# Patient Record
Sex: FEMALE | Race: WHITE | NOT HISPANIC OR LATINO | Employment: OTHER | URBAN - METROPOLITAN AREA
[De-identification: names, ages, dates, MRNs, and addresses within clinical notes are randomized per-mention and may not be internally consistent; named-entity substitution may affect disease eponyms.]

---

## 2018-03-23 ENCOUNTER — APPOINTMENT (EMERGENCY)
Dept: RADIOLOGY | Facility: HOSPITAL | Age: 58
End: 2018-03-23
Payer: COMMERCIAL

## 2018-03-23 ENCOUNTER — HOSPITAL ENCOUNTER (EMERGENCY)
Facility: HOSPITAL | Age: 58
Discharge: HOME | End: 2018-03-23
Admitting: EMERGENCY MEDICINE
Payer: COMMERCIAL

## 2018-03-23 VITALS
WEIGHT: 125 LBS | TEMPERATURE: 98.4 F | SYSTOLIC BLOOD PRESSURE: 165 MMHG | HEART RATE: 78 BPM | OXYGEN SATURATION: 96 % | RESPIRATION RATE: 17 BRPM | DIASTOLIC BLOOD PRESSURE: 80 MMHG

## 2018-03-23 DIAGNOSIS — J06.9 URI (UPPER RESPIRATORY INFECTION): Primary | ICD-10-CM

## 2018-03-23 PROCEDURE — 71046 X-RAY EXAM CHEST 2 VIEWS: CPT

## 2018-03-23 PROCEDURE — 99283 EMERGENCY DEPT VISIT LOW MDM: CPT

## 2018-03-23 NOTE — ED PROVIDER NOTES
History  Chief Complaint   Patient presents with    Sore Throat     Patient reports sore throat and a cough "at times" for the past week  Patient denies fevers  Denies n/v/d         65 yo F who presents today for evaluation of cough x 1 week  Cough productive of white sputum  Associated symptoms include nasal congestion, rhinorrhea, sore throat, right ear pain  No chest pain, no SOB  Has continued to smoke despite symptoms  Denies fevers, abd pain, n/v/d  Presents with her son who is being evaluated for similar symptoms  None       Past Medical History:   Diagnosis Date    Hyperlipidemia        Past Surgical History:   Procedure Laterality Date    BREAST SURGERY         History reviewed  No pertinent family history  I have reviewed and agree with the history as documented  Social History   Substance Use Topics    Smoking status: Current Every Day Smoker     Packs/day: 1 00     Types: Cigarettes    Smokeless tobacco: Never Used    Alcohol use No        Review of Systems   Constitutional: Negative for chills and fever  HENT: Positive for congestion, ear pain, rhinorrhea and sore throat  Negative for ear discharge and trouble swallowing  Respiratory: Positive for cough  Negative for chest tightness, shortness of breath and wheezing  Cardiovascular: Negative for chest pain and palpitations  Gastrointestinal: Negative for abdominal pain, diarrhea, nausea and vomiting  Musculoskeletal: Negative for back pain  Skin: Negative for rash  Neurological: Negative for dizziness, weakness, light-headedness, numbness and headaches         Physical Exam  ED Triage Vitals [03/23/18 1140]   Temperature Pulse Respirations Blood Pressure SpO2   98 4 °F (36 9 °C) 77 18 (!) 193/94 96 %      Temp Source Heart Rate Source Patient Position - Orthostatic VS BP Location FiO2 (%)   Oral Monitor Sitting Right arm --      Pain Score       9           Orthostatic Vital Signs  Vitals:    03/23/18 1140 03/23/18 1401   BP: (!) 193/94 165/80   Pulse: 77 78   Patient Position - Orthostatic VS: Sitting Sitting       Physical Exam   Constitutional: She is oriented to person, place, and time  She appears well-developed and well-nourished  Non-toxic appearance  She does not have a sickly appearance  She does not appear ill  No distress  Hoarse voice   HENT:   Head: Normocephalic and atraumatic  Right Ear: Hearing, tympanic membrane, external ear and ear canal normal    Left Ear: Hearing, tympanic membrane, external ear and ear canal normal    Nose: Nose normal  No mucosal edema or rhinorrhea  Mouth/Throat: Uvula is midline and oropharynx is clear and moist  No tonsillar exudate  Eyes: Conjunctivae and EOM are normal  Pupils are equal, round, and reactive to light  Neck: Trachea normal and normal range of motion  Neck supple  Cardiovascular: Normal rate, regular rhythm and normal heart sounds  Exam reveals no gallop and no friction rub  No murmur heard  Pulmonary/Chest: Effort normal and breath sounds normal  No respiratory distress  She has no decreased breath sounds  She has no wheezes  She has no rhonchi  She has no rales  Musculoskeletal: Normal range of motion  Neurological: She is alert and oriented to person, place, and time  Skin: Skin is warm and dry  Capillary refill takes less than 2 seconds  She is not diaphoretic  Psychiatric: She has a normal mood and affect  Nursing note and vitals reviewed  ED Medications  Medications - No data to display    Diagnostic Studies  Results Reviewed     None                 XR chest 2 views   ED Interpretation by Bulmaro Linares PA-C (03/23 1342)   No consolidation      Final Result by Miller Archuleta MD (03/23 1527)      No acute cardiopulmonary disease              Workstation performed: [id number]                    Procedures  Procedures       Phone Contacts  ED Phone Contact    ED Course  ED Course                                MDM  Number of Diagnoses or Management Options  URI (upper respiratory infection): new and requires workup  Diagnosis management comments:   65 yo F with complaints of sore throat, nasal congestion, rhinorrhea, hoarse voice, cough  No fevers  Vitals are stable  Her chest x-ray was negative for consolidation  Her lungs are clear to auscultation  I discussed supportive care for a viral URI  I discussed the use of over-the-counter medications  I discussed smoking cessation  I encouraged follow up with family doctor and return to ED for worsening  Patient verbalized understanding and agrees with plan  Amount and/or Complexity of Data Reviewed  Tests in the radiology section of CPT®: ordered and reviewed  Independent visualization of images, tracings, or specimens: yes    Patient Progress  Patient progress: stable    CritCare Time    Disposition  Final diagnoses:   URI (upper respiratory infection)     Time reflects when diagnosis was documented in both MDM as applicable and the Disposition within this note     Time User Action Codes Description Comment    3/23/2018  1:42 PM Xuan Zhong Add [J06 9] URI (upper respiratory infection)       ED Disposition     ED Disposition Condition Comment    Discharge  Talmage Holstein discharge to home/self care  Condition at discharge: Good        Follow-up Information     Follow up With Specialties Details Why Contact Info Additional Jun Corado Debbie 411, DO Internal Medicine Schedule an appointment as soon as possible for a visit  [address] [facility] [phone]       [address] [place] [address] Emergency Department Emergency Medicine  If symptoms worsen - fevers or trouble breathing [address] [facility] [address] [facility], [address], [address]        There are no discharge medications for this patient  No discharge procedures on file      ED Provider  Electronically Signed by           Drew Trent PA-C  03/25/18 0042

## 2018-03-23 NOTE — DISCHARGE INSTRUCTIONS
DRINK FLUIDS  Upper Respiratory Infection   WHAT YOU NEED TO KNOW:   An upper respiratory infection is also called the common cold  It is an infection that can affect your nose, throat, ears, and sinuses  For healthy people, the common cold is usually not serious and does not need special treatment  Cold symptoms are usually worst for the first 3 to 5 days  Most people get better in 7 to 14 days  You may continue to cough for 2 to 3 weeks  Colds are caused by viruses and do not get better with antibiotics  DISCHARGE INSTRUCTIONS:   Return to the emergency department if:   · You have chest pain or trouble breathing  Contact your healthcare provider if:   · You have a fever over 102ºF (39°C)  · Your sore throat gets worse or you see white or yellow spots in your throat  · Your symptoms get worse after 3 to 5 days or your cold is not better in 14 days  · You have a rash anywhere on your skin  · You have large, tender lumps in your neck  · You have thick, green or yellow drainage from your nose  · You cough up thick yellow, green, or bloody mucus  · You have vomiting for more than 24 hours and cannot keep fluids down  · You have a bad earache  · You have questions or concerns about your condition or care  Medicines: You may need any of the following:  · Decongestants  help reduce nasal congestion and help you breathe more easily  If you take decongestant pills, they may make you feel restless or cause problems with your sleep  Do not use decongestant sprays for more than a few days  · Cough suppressants  help reduce coughing  Ask your healthcare provider which type of cough medicine is best for you  · NSAIDs , such as ibuprofen, help decrease swelling, pain, and fever  NSAIDs can cause stomach bleeding or kidney problems in certain people  If you take blood thinner medicine, always ask your healthcare provider if NSAIDs are safe for you   Always read the medicine label and follow directions  · Acetaminophen  decreases pain and fever  It is available without a doctor's order  Ask how much to take and how often to take it  Follow directions  Read the labels of all other medicines you are using to see if they also contain acetaminophen, or ask your doctor or pharmacist  Acetaminophen can cause liver damage if not taken correctly  Do not use more than 4 grams (4,000 milligrams) total of acetaminophen in one day  · Take your medicine as directed  Contact your healthcare provider if you think your medicine is not helping or if you have side effects  Tell him or her if you are allergic to any medicine  Keep a list of the medicines, vitamins, and herbs you take  Include the amounts, and when and why you take them  Bring the list or the pill bottles to follow-up visits  Carry your medicine list with you in case of an emergency  Follow up with your healthcare provider as directed:  Write down your questions so you remember to ask them during your visits  Self-care:   · Rest as much as possible  Slowly start to do more each day  · Drink more liquids as directed  Liquids will help thin and loosen mucus so you can cough it up  Liquids will also help prevent dehydration  Liquids that help prevent dehydration include water, fruit juice, and broth  Do not drink liquids that contain caffeine  Caffeine can increase your risk for dehydration  Ask your healthcare provider how much liquid to drink each day  · Soothe a sore throat  Gargle with warm salt water  This helps your sore throat feel better  Make salt water by dissolving ¼ teaspoon salt in 1 cup warm water  You may also suck on hard candy or throat lozenges  You may use a sore throat spray  · Use a humidifier or vaporizer  Use a cool mist humidifier or a vaporizer to increase air moisture in your home  This may make it easier for you to breathe and help decrease your cough  · Use saline nasal drops as directed    These help relieve congestion  · Apply petroleum-based jelly around the outside of your nostrils  This can decrease irritation from blowing your nose  · Do not smoke  Nicotine and other chemicals in cigarettes and cigars can make your symptoms worse  They can also cause infections such as bronchitis or pneumonia  Ask your healthcare provider for information if you currently smoke and need help to quit  E-cigarettes or smokeless tobacco still contain nicotine  Talk to your healthcare provider before you use these products  Prevent spreading your cold to others:   · Try to stay away from other people during the first 2 to 3 days of your cold when it is more easily spread  · Do not share food or drinks  · Do not share hand towels with household members  · Wash your hands often, especially after you blow your nose  Turn away from other people and cover your mouth and nose with a tissue when you sneeze or cough  © 2017 [employer]  Information is for End User's use only and may not be sold, redistributed or otherwise used for commercial purposes  All illustrations and images included in CareNotes® are the copyrighted property of [employer]  or [employer]  The above information is an  only  It is not intended as medical advice for individual conditions or treatments  Talk to your doctor, nurse or pharmacist before following any medical regimen to see if it is safe and effective for you

## 2021-04-20 ENCOUNTER — TELEPHONE (OUTPATIENT)
Dept: CT IMAGING | Facility: HOSPITAL | Age: 61
End: 2021-04-20

## 2021-04-20 ENCOUNTER — APPOINTMENT (EMERGENCY)
Dept: CT IMAGING | Facility: HOSPITAL | Age: 61
End: 2021-04-20
Payer: COMMERCIAL

## 2021-04-20 ENCOUNTER — APPOINTMENT (EMERGENCY)
Dept: RADIOLOGY | Facility: HOSPITAL | Age: 61
End: 2021-04-20
Payer: COMMERCIAL

## 2021-04-20 ENCOUNTER — HOSPITAL ENCOUNTER (OUTPATIENT)
Facility: HOSPITAL | Age: 61
Setting detail: OBSERVATION
Discharge: LEFT BEFORE DISCHARGE | End: 2021-04-20
Attending: EMERGENCY MEDICINE | Admitting: INTERNAL MEDICINE
Payer: COMMERCIAL

## 2021-04-20 VITALS
RESPIRATION RATE: 18 BRPM | DIASTOLIC BLOOD PRESSURE: 106 MMHG | HEIGHT: 64 IN | HEART RATE: 88 BPM | OXYGEN SATURATION: 94 % | BODY MASS INDEX: 21.34 KG/M2 | WEIGHT: 125 LBS | TEMPERATURE: 98.3 F | SYSTOLIC BLOOD PRESSURE: 172 MMHG

## 2021-04-20 DIAGNOSIS — R77.8 ELEVATED TROPONIN: ICD-10-CM

## 2021-04-20 DIAGNOSIS — R91.1 PULMONARY NODULE: ICD-10-CM

## 2021-04-20 DIAGNOSIS — R10.9 ABDOMINAL PAIN: ICD-10-CM

## 2021-04-20 DIAGNOSIS — I10 HYPERTENSION: Primary | ICD-10-CM

## 2021-04-20 DIAGNOSIS — R79.89 ELEVATED BRAIN NATRIURETIC PEPTIDE (BNP) LEVEL: ICD-10-CM

## 2021-04-20 DIAGNOSIS — R06.00 DYSPNEA ON EXERTION: ICD-10-CM

## 2021-04-20 LAB
ALBUMIN SERPL BCP-MCNC: 3.7 G/DL (ref 3.5–5)
ALP SERPL-CCNC: 98 U/L (ref 46–116)
ALT SERPL W P-5'-P-CCNC: 27 U/L (ref 12–78)
ANION GAP SERPL CALCULATED.3IONS-SCNC: 11 MMOL/L (ref 4–13)
APTT PPP: 30 SECONDS (ref 23–37)
AST SERPL W P-5'-P-CCNC: 22 U/L (ref 5–45)
BACTERIA UR QL AUTO: NORMAL /HPF
BASOPHILS # BLD AUTO: 0.04 THOUSANDS/ΜL (ref 0–0.1)
BASOPHILS NFR BLD AUTO: 1 % (ref 0–1)
BILIRUB SERPL-MCNC: 0.41 MG/DL (ref 0.2–1)
BILIRUB UR QL STRIP: NEGATIVE
BUN SERPL-MCNC: 19 MG/DL (ref 5–25)
CALCIUM SERPL-MCNC: 9.7 MG/DL (ref 8.3–10.1)
CHLORIDE SERPL-SCNC: 103 MMOL/L (ref 100–108)
CLARITY UR: CLEAR
CO2 SERPL-SCNC: 28 MMOL/L (ref 21–32)
COLOR UR: YELLOW
CREAT SERPL-MCNC: 0.99 MG/DL (ref 0.6–1.3)
D DIMER PPP FEU-MCNC: 2.17 UG/ML FEU
EOSINOPHIL # BLD AUTO: 0.08 THOUSAND/ΜL (ref 0–0.61)
EOSINOPHIL NFR BLD AUTO: 1 % (ref 0–6)
ERYTHROCYTE [DISTWIDTH] IN BLOOD BY AUTOMATED COUNT: 13 % (ref 11.6–15.1)
FLUAV RNA RESP QL NAA+PROBE: NEGATIVE
FLUBV RNA RESP QL NAA+PROBE: NEGATIVE
GFR SERPL CREATININE-BSD FRML MDRD: 63 ML/MIN/1.73SQ M
GLUCOSE SERPL-MCNC: 99 MG/DL (ref 65–140)
GLUCOSE UR STRIP-MCNC: NEGATIVE MG/DL
HCT VFR BLD AUTO: 45.5 % (ref 34.8–46.1)
HGB BLD-MCNC: 15.6 G/DL (ref 11.5–15.4)
HGB UR QL STRIP.AUTO: NEGATIVE
IMM GRANULOCYTES # BLD AUTO: 0.03 THOUSAND/UL (ref 0–0.2)
IMM GRANULOCYTES NFR BLD AUTO: 0 % (ref 0–2)
INR PPP: 1 (ref 0.84–1.19)
KETONES UR STRIP-MCNC: NEGATIVE MG/DL
LEUKOCYTE ESTERASE UR QL STRIP: ABNORMAL
LYMPHOCYTES # BLD AUTO: 2.03 THOUSANDS/ΜL (ref 0.6–4.47)
LYMPHOCYTES NFR BLD AUTO: 27 % (ref 14–44)
MCH RBC QN AUTO: 31.1 PG (ref 26.8–34.3)
MCHC RBC AUTO-ENTMCNC: 34.3 G/DL (ref 31.4–37.4)
MCV RBC AUTO: 91 FL (ref 82–98)
MONOCYTES # BLD AUTO: 0.96 THOUSAND/ΜL (ref 0.17–1.22)
MONOCYTES NFR BLD AUTO: 13 % (ref 4–12)
NEUTROPHILS # BLD AUTO: 4.32 THOUSANDS/ΜL (ref 1.85–7.62)
NEUTS SEG NFR BLD AUTO: 58 % (ref 43–75)
NITRITE UR QL STRIP: NEGATIVE
NON-SQ EPI CELLS URNS QL MICRO: NORMAL /HPF
NRBC BLD AUTO-RTO: 0 /100 WBCS
NT-PROBNP SERPL-MCNC: 4138 PG/ML
PH UR STRIP.AUTO: 6.5 [PH] (ref 4.5–8)
PLATELET # BLD AUTO: 277 THOUSANDS/UL (ref 149–390)
PMV BLD AUTO: 10.6 FL (ref 8.9–12.7)
POTASSIUM SERPL-SCNC: 3.5 MMOL/L (ref 3.5–5.3)
PROT SERPL-MCNC: 8.3 G/DL (ref 6.4–8.2)
PROT UR STRIP-MCNC: ABNORMAL MG/DL
PROTHROMBIN TIME: 13.3 SECONDS (ref 11.6–14.5)
RBC # BLD AUTO: 5.01 MILLION/UL (ref 3.81–5.12)
RBC #/AREA URNS AUTO: NORMAL /HPF
RSV RNA RESP QL NAA+PROBE: NEGATIVE
SARS-COV-2 RNA RESP QL NAA+PROBE: NEGATIVE
SODIUM SERPL-SCNC: 142 MMOL/L (ref 136–145)
SP GR UR STRIP.AUTO: 1.02 (ref 1–1.03)
TROPONIN I SERPL-MCNC: 0.05 NG/ML
TROPONIN I SERPL-MCNC: 0.06 NG/ML
UROBILINOGEN UR QL STRIP.AUTO: 0.2 E.U./DL
WBC # BLD AUTO: 7.46 THOUSAND/UL (ref 4.31–10.16)
WBC #/AREA URNS AUTO: NORMAL /HPF

## 2021-04-20 PROCEDURE — 71275 CT ANGIOGRAPHY CHEST: CPT

## 2021-04-20 PROCEDURE — 85025 COMPLETE CBC W/AUTO DIFF WBC: CPT | Performed by: EMERGENCY MEDICINE

## 2021-04-20 PROCEDURE — 84484 ASSAY OF TROPONIN QUANT: CPT | Performed by: EMERGENCY MEDICINE

## 2021-04-20 PROCEDURE — 81001 URINALYSIS AUTO W/SCOPE: CPT

## 2021-04-20 PROCEDURE — 80053 COMPREHEN METABOLIC PANEL: CPT | Performed by: EMERGENCY MEDICINE

## 2021-04-20 PROCEDURE — 74177 CT ABD & PELVIS W/CONTRAST: CPT

## 2021-04-20 PROCEDURE — 36415 COLL VENOUS BLD VENIPUNCTURE: CPT | Performed by: EMERGENCY MEDICINE

## 2021-04-20 PROCEDURE — 99285 EMERGENCY DEPT VISIT HI MDM: CPT | Performed by: EMERGENCY MEDICINE

## 2021-04-20 PROCEDURE — 83880 ASSAY OF NATRIURETIC PEPTIDE: CPT | Performed by: EMERGENCY MEDICINE

## 2021-04-20 PROCEDURE — 96361 HYDRATE IV INFUSION ADD-ON: CPT

## 2021-04-20 PROCEDURE — 96375 TX/PRO/DX INJ NEW DRUG ADDON: CPT

## 2021-04-20 PROCEDURE — 96374 THER/PROPH/DIAG INJ IV PUSH: CPT

## 2021-04-20 PROCEDURE — G1004 CDSM NDSC: HCPCS

## 2021-04-20 PROCEDURE — 71045 X-RAY EXAM CHEST 1 VIEW: CPT

## 2021-04-20 PROCEDURE — 85610 PROTHROMBIN TIME: CPT | Performed by: EMERGENCY MEDICINE

## 2021-04-20 PROCEDURE — 93005 ELECTROCARDIOGRAM TRACING: CPT

## 2021-04-20 PROCEDURE — 0241U HB NFCT DS VIR RESP RNA 4 TRGT: CPT | Performed by: EMERGENCY MEDICINE

## 2021-04-20 PROCEDURE — 85379 FIBRIN DEGRADATION QUANT: CPT | Performed by: EMERGENCY MEDICINE

## 2021-04-20 PROCEDURE — 85730 THROMBOPLASTIN TIME PARTIAL: CPT | Performed by: EMERGENCY MEDICINE

## 2021-04-20 PROCEDURE — 99285 EMERGENCY DEPT VISIT HI MDM: CPT

## 2021-04-20 RX ORDER — ASPIRIN 325 MG
325 TABLET ORAL ONCE
Status: COMPLETED | OUTPATIENT
Start: 2021-04-20 | End: 2021-04-20

## 2021-04-20 RX ORDER — FENTANYL CITRATE 50 UG/ML
50 INJECTION, SOLUTION INTRAMUSCULAR; INTRAVENOUS ONCE
Status: COMPLETED | OUTPATIENT
Start: 2021-04-20 | End: 2021-04-20

## 2021-04-20 RX ORDER — HYDROMORPHONE HCL/PF 1 MG/ML
0.5 SYRINGE (ML) INJECTION ONCE
Status: COMPLETED | OUTPATIENT
Start: 2021-04-20 | End: 2021-04-20

## 2021-04-20 RX ORDER — ONDANSETRON 2 MG/ML
4 INJECTION INTRAMUSCULAR; INTRAVENOUS ONCE
Status: COMPLETED | OUTPATIENT
Start: 2021-04-20 | End: 2021-04-20

## 2021-04-20 RX ADMIN — ONDANSETRON 4 MG: 2 INJECTION INTRAMUSCULAR; INTRAVENOUS at 17:10

## 2021-04-20 RX ADMIN — IOHEXOL 100 ML: 350 INJECTION, SOLUTION INTRAVENOUS at 20:10

## 2021-04-20 RX ADMIN — ASPIRIN 325 MG ORAL TABLET 325 MG: 325 PILL ORAL at 21:33

## 2021-04-20 RX ADMIN — SODIUM CHLORIDE 1000 ML: 0.9 INJECTION, SOLUTION INTRAVENOUS at 17:08

## 2021-04-20 RX ADMIN — HYDROMORPHONE HYDROCHLORIDE 0.5 MG: 1 INJECTION, SOLUTION INTRAMUSCULAR; INTRAVENOUS; SUBCUTANEOUS at 17:14

## 2021-04-20 RX ADMIN — FENTANYL CITRATE 50 MCG: 50 INJECTION INTRAMUSCULAR; INTRAVENOUS at 19:13

## 2021-04-20 NOTE — Clinical Note
Case was discussed with LEXI and the patient's admission status was agreed to be Admission Status: observation status to the service of Dr Jessica Leonard
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
## 2021-04-21 LAB
ATRIAL RATE: 99 BPM
P AXIS: 38 DEGREES
PR INTERVAL: 156 MS
QRS AXIS: 15 DEGREES
QRSD INTERVAL: 94 MS
QT INTERVAL: 378 MS
QTC INTERVAL: 473 MS
T WAVE AXIS: 73 DEGREES
VENTRICULAR RATE: 94 BPM

## 2021-04-21 PROCEDURE — 93010 ELECTROCARDIOGRAM REPORT: CPT | Performed by: INTERNAL MEDICINE

## 2021-04-21 NOTE — ASSESSMENT & PLAN NOTE
· Initial troponin 0 05 with repeat troponin of 0 06    · Presented with SINGH, possible anginal equivalent vs troponin elevation in the setting of elevated BP  · EKG with nonspecific T waves  · Continue to trend troponin and monitor on telemetry     · Cardiology consulted  · Obtain echo

## 2021-04-21 NOTE — ED NOTES
Patient transported to [facility] 20, [address] [facility]  04/20/21 2006

## 2021-04-21 NOTE — ASSESSMENT & PLAN NOTE
· Incidental finding of a 5 mm right middle lobe nodule  "Based on current Fleischner Society 2017 Guidelines on incidental pulmonary nodule, no routine follow-up is needed if the patient is considered low risk for lung cancer    If the patient is considered high risk for lung cancer, 12 month follow-up non-contrast chest CT is recommended "

## 2021-04-21 NOTE — ASSESSMENT & PLAN NOTE
· CT abdomen/ pelvis shows no acute findings in the abdomen or pelvis but does note a lobulated contour of the liver, suspicious for early cirrhosis  · LFTs within normal limits  Urine unremarkable for infection

## 2021-04-21 NOTE — ED CARE HANDOFF
Emergency Department Sign Out Note        Sign out and transfer of care from Dr Tigre Lizarraga at 1900  See Separate Emergency Department note  The patient, Hernandez De Luna, was evaluated by the previous provider for abdominal pain/dyspnea on exertion       Workup Completed:  Labs show troponin 0 05  EKG nonspecific  CT PE study/CT abdomen/pelvis order and pending at time of sign-out  ED Course / Workup Pending (followup):  Plan to admit following CT scan results  No aspirin given as dissection was still in the differential     CT PE study with no signs of acute emergent pathology  Patient re-evaluated  No active chest pain  Aspirin given and patient admitted to [facility] service for observation admission on telemetry  Patient was admitted to the [facility] service for observation on telemetry  After patient was admitted she decided she would leave against medical advice  She is alert oriented  She understands that she has risk of heart attack, CHF, heart failure and death if she leaves              PERC Rule for PE      Most Recent Value   PERC Rule for PE   Age >=50  1 Filed at: 2021 1658   HR >=100  1 Filed at: 2021 1658   O2 Sat on room air < 95%  0 Filed at: 2021 1658   History of PE or DVT  --   Recent trauma or surgery  0 Filed at: 2021 1658   Hemoptysis  0 Filed at: 2021 1658   Exogenous estrogen  0 Filed at: 2021 1658   Unilateral leg swelling  0 Filed at: 2021 1658   PERC Rule for PE Results  2 Filed at: 2021 1658              Wells' Criteria for PE      Most Recent Value   Wells' Criteria for PE   Clinical signs and symptoms of DVT  0 Filed at: 2021 1658   PE is primary diagnosis or equally likely  3 Filed at: 2021 1658   HR >100  1 5 Filed at: 2021 1658   Immobilization at least 3 days or Surgery in the previous 4 weeks  0 Filed at: 2021 1658   Previous, objectively diagnosed PE or DVT  0 Filed at: 2021 1658 Hemoptysis  0 Filed at: 04/20/2021 1658   Malignancy with treatment within 6 months or palliative  0 Filed at: 04/20/2021 1658   Wells' Criteria Total  4 5 Filed at: 04/20/2021 1658                       Procedures  MDM    Disposition  Final diagnoses:   Hypertension   Elevated troponin   Elevated brain natriuretic peptide (BNP) level   Abdominal pain   Dyspnea on exertion     Time reflects when diagnosis was documented in both MDM as applicable and the Disposition within this note     Time User Action Codes Description Comment    4/20/2021  9:07 PM Remy Ditch Add [I10] Hypertension     4/20/2021  9:07 PM Morales Vizcarra [R77 8] Elevated troponin     4/20/2021  9:07 PM Remy Ditch Add [R79 89] Elevated brain natriuretic peptide (BNP) level     4/20/2021  9:07 PM Remy Ditch Add [R10 9] Abdominal pain     4/20/2021  9:07 PM Remy Ditch Add [R06 00] Dyspnea on exertion       ED Disposition     ED Disposition Condition Date/Time Comment    Admit Stable Tue Apr 20, 2021  9:07 PM Case was discussed with LEXI and the patient's admission status was agreed to be Admission Status: observation status to the service of Dr Yaa Madden   Follow-up Information    None       Patient's Medications    No medications on file     No discharge procedures on file         ED Provider  Electronically Signed by     Susi Mueller MD  04/20/21 1328       Susi Mueller MD  04/20/21 4311

## 2021-04-21 NOTE — ASSESSMENT & PLAN NOTE
· Presented with SINGH  · D-dimer and BNP elevated  CTA chest with no evidence of PE and no acute pulmonary disease; mild emphysema noted  · Obtain echocardiogram   · Pulse ox reportedly 87-90% on RA while in the ED per ED provider's note and patient was placed on 3L NC oxygen with improvement in pulse ox to the mid 90s  · Wean oxygen as tolerated

## 2021-04-30 ENCOUNTER — HOSPITAL ENCOUNTER (INPATIENT)
Facility: HOSPITAL | Age: 61
LOS: 5 days | Discharge: HOME | DRG: 469 | End: 2021-05-07
Attending: EMERGENCY MEDICINE | Admitting: HOSPITALIST
Payer: COMMERCIAL

## 2021-04-30 ENCOUNTER — APPOINTMENT (EMERGENCY)
Dept: RADIOLOGY | Facility: HOSPITAL | Age: 61
DRG: 469 | End: 2021-04-30
Payer: COMMERCIAL

## 2021-04-30 DIAGNOSIS — R21 RASH OF FOOT: ICD-10-CM

## 2021-04-30 DIAGNOSIS — G47.00 INSOMNIA, UNSPECIFIED TYPE: ICD-10-CM

## 2021-04-30 DIAGNOSIS — I10 HYPERTENSION: ICD-10-CM

## 2021-04-30 DIAGNOSIS — R07.9 CHEST PAIN: Primary | ICD-10-CM

## 2021-04-30 DIAGNOSIS — N17.9 AKI (ACUTE KIDNEY INJURY) (HCC): ICD-10-CM

## 2021-04-30 DIAGNOSIS — R79.89 ELEVATED BRAIN NATRIURETIC PEPTIDE (BNP) LEVEL: ICD-10-CM

## 2021-04-30 DIAGNOSIS — E78.5 HYPERLIPIDEMIA, UNSPECIFIED HYPERLIPIDEMIA TYPE: ICD-10-CM

## 2021-04-30 DIAGNOSIS — F17.200 SMOKING: ICD-10-CM

## 2021-04-30 DIAGNOSIS — R79.89 ELEVATED D-DIMER: ICD-10-CM

## 2021-04-30 DIAGNOSIS — R06.00 DOE (DYSPNEA ON EXERTION): ICD-10-CM

## 2021-04-30 DIAGNOSIS — R10.9 ABDOMINAL PAIN, UNSPECIFIED ABDOMINAL LOCATION: ICD-10-CM

## 2021-04-30 DIAGNOSIS — I50.9 NEW ONSET OF CONGESTIVE HEART FAILURE (HCC): ICD-10-CM

## 2021-04-30 DIAGNOSIS — R06.00 DYSPNEA: ICD-10-CM

## 2021-04-30 DIAGNOSIS — M54.9 BACK PAIN: ICD-10-CM

## 2021-04-30 PROBLEM — R06.02 SHORTNESS OF BREATH: Status: ACTIVE | Noted: 2021-04-30

## 2021-04-30 PROBLEM — I16.0 HYPERTENSIVE URGENCY: Status: ACTIVE | Noted: 2021-04-30

## 2021-04-30 PROBLEM — R93.2 ABNORMAL FINDING ON IMAGING OF LIVER: Status: ACTIVE | Noted: 2021-04-30

## 2021-04-30 LAB
ALBUMIN SERPL BCP-MCNC: 3.1 G/DL (ref 3.5–5)
ALP SERPL-CCNC: 125 U/L (ref 46–116)
ALT SERPL W P-5'-P-CCNC: 37 U/L (ref 12–78)
ANION GAP SERPL CALCULATED.3IONS-SCNC: 6 MMOL/L (ref 4–13)
AST SERPL W P-5'-P-CCNC: 25 U/L (ref 5–45)
ATRIAL RATE: 92 BPM
BASOPHILS # BLD AUTO: 0.05 THOUSANDS/ΜL (ref 0–0.1)
BASOPHILS NFR BLD AUTO: 1 % (ref 0–1)
BILIRUB SERPL-MCNC: 0.59 MG/DL (ref 0.2–1)
BUN SERPL-MCNC: 15 MG/DL (ref 5–25)
CALCIUM ALBUM COR SERPL-MCNC: 10.1 MG/DL (ref 8.3–10.1)
CALCIUM SERPL-MCNC: 9.4 MG/DL (ref 8.3–10.1)
CHLORIDE SERPL-SCNC: 111 MMOL/L (ref 100–108)
CHOLEST SERPL-MCNC: 199 MG/DL (ref 50–200)
CO2 SERPL-SCNC: 24 MMOL/L (ref 21–32)
CREAT SERPL-MCNC: 0.99 MG/DL (ref 0.6–1.3)
D DIMER PPP FEU-MCNC: 3.09 UG/ML FEU
EOSINOPHIL # BLD AUTO: 0.17 THOUSAND/ΜL (ref 0–0.61)
EOSINOPHIL NFR BLD AUTO: 2 % (ref 0–6)
ERYTHROCYTE [DISTWIDTH] IN BLOOD BY AUTOMATED COUNT: 12.9 % (ref 11.6–15.1)
EST. AVERAGE GLUCOSE BLD GHB EST-MCNC: 108 MG/DL
GFR SERPL CREATININE-BSD FRML MDRD: 63 ML/MIN/1.73SQ M
GLUCOSE SERPL-MCNC: 117 MG/DL (ref 65–140)
HBA1C MFR BLD: 5.4 %
HCT VFR BLD AUTO: 37.6 % (ref 34.8–46.1)
HDLC SERPL-MCNC: 44 MG/DL
HGB BLD-MCNC: 12.4 G/DL (ref 11.5–15.4)
IMM GRANULOCYTES # BLD AUTO: 0.03 THOUSAND/UL (ref 0–0.2)
IMM GRANULOCYTES NFR BLD AUTO: 0 % (ref 0–2)
LDLC SERPL CALC-MCNC: 134 MG/DL (ref 0–100)
LIPASE SERPL-CCNC: 93 U/L (ref 73–393)
LYMPHOCYTES # BLD AUTO: 0.88 THOUSANDS/ΜL (ref 0.6–4.47)
LYMPHOCYTES NFR BLD AUTO: 10 % (ref 14–44)
MAGNESIUM SERPL-MCNC: 2.3 MG/DL (ref 1.6–2.6)
MCH RBC QN AUTO: 30.2 PG (ref 26.8–34.3)
MCHC RBC AUTO-ENTMCNC: 33 G/DL (ref 31.4–37.4)
MCV RBC AUTO: 92 FL (ref 82–98)
MONOCYTES # BLD AUTO: 0.86 THOUSAND/ΜL (ref 0.17–1.22)
MONOCYTES NFR BLD AUTO: 10 % (ref 4–12)
NEUTROPHILS # BLD AUTO: 6.59 THOUSANDS/ΜL (ref 1.85–7.62)
NEUTS SEG NFR BLD AUTO: 77 % (ref 43–75)
NRBC BLD AUTO-RTO: 0 /100 WBCS
NT-PROBNP SERPL-MCNC: 6281 PG/ML
P AXIS: 106 DEGREES
PLATELET # BLD AUTO: 288 THOUSANDS/UL (ref 149–390)
PMV BLD AUTO: 10.9 FL (ref 8.9–12.7)
POTASSIUM SERPL-SCNC: 3.4 MMOL/L (ref 3.5–5.3)
PR INTERVAL: 128 MS
PROT SERPL-MCNC: 8.1 G/DL (ref 6.4–8.2)
QRS AXIS: -22 DEGREES
QRSD INTERVAL: 94 MS
QT INTERVAL: 362 MS
QTC INTERVAL: 447 MS
RBC # BLD AUTO: 4.1 MILLION/UL (ref 3.81–5.12)
SARS-COV-2 RNA RESP QL NAA+PROBE: NEGATIVE
SODIUM SERPL-SCNC: 141 MMOL/L (ref 136–145)
T WAVE AXIS: 100 DEGREES
TRIGL SERPL-MCNC: 103 MG/DL
TROPONIN I SERPL-MCNC: 0.03 NG/ML
TSH SERPL DL<=0.05 MIU/L-ACNC: 3.61 UIU/ML (ref 0.36–3.74)
VENTRICULAR RATE: 92 BPM
WBC # BLD AUTO: 8.58 THOUSAND/UL (ref 4.31–10.16)

## 2021-04-30 PROCEDURE — 83880 ASSAY OF NATRIURETIC PEPTIDE: CPT | Performed by: EMERGENCY MEDICINE

## 2021-04-30 PROCEDURE — 36415 COLL VENOUS BLD VENIPUNCTURE: CPT | Performed by: EMERGENCY MEDICINE

## 2021-04-30 PROCEDURE — 85379 FIBRIN DEGRADATION QUANT: CPT | Performed by: EMERGENCY MEDICINE

## 2021-04-30 PROCEDURE — U0003 INFECTIOUS AGENT DETECTION BY NUCLEIC ACID (DNA OR RNA); SEVERE ACUTE RESPIRATORY SYNDROME CORONAVIRUS 2 (SARS-COV-2) (CORONAVIRUS DISEASE [COVID-19]), AMPLIFIED PROBE TECHNIQUE, MAKING USE OF HIGH THROUGHPUT TECHNOLOGIES AS DESCRIBED BY CMS-2020-01-R: HCPCS | Performed by: EMERGENCY MEDICINE

## 2021-04-30 PROCEDURE — 83735 ASSAY OF MAGNESIUM: CPT | Performed by: STUDENT IN AN ORGANIZED HEALTH CARE EDUCATION/TRAINING PROGRAM

## 2021-04-30 PROCEDURE — 71045 X-RAY EXAM CHEST 1 VIEW: CPT

## 2021-04-30 PROCEDURE — 94760 N-INVAS EAR/PLS OXIMETRY 1: CPT

## 2021-04-30 PROCEDURE — 85025 COMPLETE CBC W/AUTO DIFF WBC: CPT | Performed by: EMERGENCY MEDICINE

## 2021-04-30 PROCEDURE — 93010 ELECTROCARDIOGRAM REPORT: CPT | Performed by: INTERNAL MEDICINE

## 2021-04-30 PROCEDURE — 80053 COMPREHEN METABOLIC PANEL: CPT | Performed by: EMERGENCY MEDICINE

## 2021-04-30 PROCEDURE — 99285 EMERGENCY DEPT VISIT HI MDM: CPT

## 2021-04-30 PROCEDURE — 99223 1ST HOSP IP/OBS HIGH 75: CPT | Performed by: HOSPITALIST

## 2021-04-30 PROCEDURE — 93005 ELECTROCARDIOGRAM TRACING: CPT

## 2021-04-30 PROCEDURE — 84484 ASSAY OF TROPONIN QUANT: CPT | Performed by: EMERGENCY MEDICINE

## 2021-04-30 PROCEDURE — 96374 THER/PROPH/DIAG INJ IV PUSH: CPT

## 2021-04-30 PROCEDURE — NC001 PR NO CHARGE: Performed by: HOSPITALIST

## 2021-04-30 PROCEDURE — U0005 INFEC AGEN DETEC AMPLI PROBE: HCPCS | Performed by: EMERGENCY MEDICINE

## 2021-04-30 PROCEDURE — 83036 HEMOGLOBIN GLYCOSYLATED A1C: CPT | Performed by: STUDENT IN AN ORGANIZED HEALTH CARE EDUCATION/TRAINING PROGRAM

## 2021-04-30 PROCEDURE — 84443 ASSAY THYROID STIM HORMONE: CPT | Performed by: STUDENT IN AN ORGANIZED HEALTH CARE EDUCATION/TRAINING PROGRAM

## 2021-04-30 PROCEDURE — 99285 EMERGENCY DEPT VISIT HI MDM: CPT | Performed by: EMERGENCY MEDICINE

## 2021-04-30 PROCEDURE — 80061 LIPID PANEL: CPT | Performed by: STUDENT IN AN ORGANIZED HEALTH CARE EDUCATION/TRAINING PROGRAM

## 2021-04-30 PROCEDURE — 83690 ASSAY OF LIPASE: CPT | Performed by: STUDENT IN AN ORGANIZED HEALTH CARE EDUCATION/TRAINING PROGRAM

## 2021-04-30 RX ORDER — FUROSEMIDE 10 MG/ML
40 INJECTION INTRAMUSCULAR; INTRAVENOUS DAILY
Status: DISCONTINUED | OUTPATIENT
Start: 2021-04-30 | End: 2021-05-01

## 2021-04-30 RX ORDER — ACETAMINOPHEN 325 MG/1
975 TABLET ORAL EVERY 6 HOURS PRN
Status: DISCONTINUED | OUTPATIENT
Start: 2021-04-30 | End: 2021-05-07 | Stop reason: HOSPADM

## 2021-04-30 RX ORDER — LABETALOL 20 MG/4 ML (5 MG/ML) INTRAVENOUS SYRINGE
10 EVERY 6 HOURS PRN
Status: DISCONTINUED | OUTPATIENT
Start: 2021-04-30 | End: 2021-05-07 | Stop reason: HOSPADM

## 2021-04-30 RX ORDER — POTASSIUM CHLORIDE 20 MEQ/1
40 TABLET, EXTENDED RELEASE ORAL ONCE
Status: COMPLETED | OUTPATIENT
Start: 2021-04-30 | End: 2021-04-30

## 2021-04-30 RX ORDER — KETOROLAC TROMETHAMINE 30 MG/ML
30 INJECTION, SOLUTION INTRAMUSCULAR; INTRAVENOUS EVERY 6 HOURS PRN
Status: DISCONTINUED | OUTPATIENT
Start: 2021-04-30 | End: 2021-05-01

## 2021-04-30 RX ORDER — DICYCLOMINE HCL 20 MG
20 TABLET ORAL
Status: DISCONTINUED | OUTPATIENT
Start: 2021-04-30 | End: 2021-05-07 | Stop reason: HOSPADM

## 2021-04-30 RX ORDER — HYDROMORPHONE HCL/PF 1 MG/ML
0.5 SYRINGE (ML) INJECTION ONCE AS NEEDED
Status: DISCONTINUED | OUTPATIENT
Start: 2021-04-30 | End: 2021-04-30

## 2021-04-30 RX ORDER — ALBUTEROL SULFATE 90 UG/1
2 AEROSOL, METERED RESPIRATORY (INHALATION) EVERY 4 HOURS PRN
Status: DISCONTINUED | OUTPATIENT
Start: 2021-04-30 | End: 2021-05-07 | Stop reason: HOSPADM

## 2021-04-30 RX ORDER — MORPHINE SULFATE 4 MG/ML
4 INJECTION, SOLUTION INTRAMUSCULAR; INTRAVENOUS ONCE
Status: COMPLETED | OUTPATIENT
Start: 2021-04-30 | End: 2021-04-30

## 2021-04-30 RX ORDER — ACETAMINOPHEN 325 MG/1
650 TABLET ORAL EVERY 6 HOURS PRN
Status: DISCONTINUED | OUTPATIENT
Start: 2021-04-30 | End: 2021-04-30

## 2021-04-30 RX ORDER — CALCIUM CARBONATE 200(500)MG
1000 TABLET,CHEWABLE ORAL DAILY PRN
Status: DISCONTINUED | OUTPATIENT
Start: 2021-04-30 | End: 2021-05-07 | Stop reason: HOSPADM

## 2021-04-30 RX ADMIN — KETOROLAC TROMETHAMINE 30 MG: 30 INJECTION, SOLUTION INTRAMUSCULAR at 21:24

## 2021-04-30 RX ADMIN — DICYCLOMINE HYDROCHLORIDE 20 MG: 20 TABLET ORAL at 18:05

## 2021-04-30 RX ADMIN — FUROSEMIDE 40 MG: 10 INJECTION, SOLUTION INTRAMUSCULAR; INTRAVENOUS at 18:05

## 2021-04-30 RX ADMIN — MORPHINE SULFATE 4 MG: 4 INJECTION INTRAVENOUS at 14:16

## 2021-04-30 RX ADMIN — POTASSIUM CHLORIDE 40 MEQ: 1500 TABLET, EXTENDED RELEASE ORAL at 18:03

## 2021-04-30 RX ADMIN — ACETAMINOPHEN 650 MG: 325 TABLET ORAL at 18:05

## 2021-04-30 NOTE — Clinical Note
Case was discussed with Dr Abdoulaye Dolan, 55 [address]  [facility] admitting resident, and the patient's admission status was agreed to be Admission Status: inpatient status to the service of Dr Angie Geller

## 2021-04-30 NOTE — PROGRESS NOTES
INTERNAL MEDICINE RESIDENCY SENIOR ADMISSION NOTE     Name: Grabiel Wayne   Age & Sex: 63 y o  female   MRN: [id number]  Unit/Bed#: ED 6   Encounter: [id number]  Primary Care Provider: No primary care provider on file  Admit to team: SOD Team A    Patient seen and examined  Reviewed H&P per Dr Rocio Best   Agree with the assessment and plan with any exception/addition as noted below:    Principal Problem:    Shortness of breath  Active Problems:    Hyperlipidemia    Hypertensive urgency    Positive D dimer    Abnormal finding on imaging of liver    68-year-old female with past medical history of hyperlipidemia, tobacco abuse was not seen a physician in many years presented to the ED with worsening 2 week history of shortness of breath  Of note patient did present to [address] [place] [address] ED on 4/20 with similar presentation, as that time admitted for possible newly diagnosed heart failure, patient left AMA  Return to the ED today because her shortness of breath and dyspnea on exertion worsen  Denies any chest pain at all currently, or in the last 3 weeks  Endorses orthopnea  Generalized abdominal bloating and fullness as well as early satiety  Upon arrival to the ED patient's initial blood pressure 198/111 heart rate in the 90s  Chest x-ray revealed cardiomegaly with vascular congestion  NT proBNP 6200 today from 4100 on 04/20/2021  EKG normal sinus rhythm, left ventricular hypertrophy, inverted T-waves noted in lateral leads  Troponin 0 03  Positive JVD, bilateral crackles  Likely newly diagnosed heart failure and hypertensive urgency in setting of chronically uncontrolled hypertension (per chart multiple BP readings ranging from 150-160/ since 2015)  · Will initiate on 40 IV Lasix q d  · Formal echocardiogram ordered, consider ischemic workup to rule out ischemic cardiomyopathy if this is volume overload in setting of newly diagnosed heart failure  · P r n   Labetalol for SBP greater than 180, plan to start on p o   Antihypertensive tomorrow  · Monitor on telemetry    Further management as highlighted in H&P      Code Status: Level 3 - DNAR and DNI  Admission Status: OBSERVATION  Disposition: Patient requires Med/Surg with [address] [place] [address]  Internal Medicine- PGY2
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
## 2021-04-30 NOTE — ED ATTENDING ATTESTATION
Sasha Romo MD, saw and evaluated the patient  I have discussed the patient with the resident and agree with the resident's findings, Plan of Care, and MDM as documented in the resident's note, except where noted  All available labs and Radiology studies were reviewed  I was present for key portions of any procedure(s) performed by the resident and I was immediately available to provide assistance  At this point I agree with the current assessment done in the Emergency Department  I have conducted an independent evaluation of this patient a history and physical is as follows:    62 yo female presents to the ED with multiple vague complaints including abdominal pain, shortness of breath, chest pain, and back pain x 2 weeks  The patient was recently evaluated at [address] [place] [address] for these symptoms --> workup revealed a mildly elevated troponin, BNP, and d dimer  Subsequent PE CT was negative  She was admitted to the hospital for further workup but left AMA before a bed was assigned  She has been at an outside ED twice since that time but LWBS both times  She says all of her symptoms have been gradually worsening since onset and she "can't take it any more"  Gen: Anxious/Tearful, AA&Ox3  HEENT: PERRL, EOMI  Neck: supple  CV: RRR  Lungs: CT AB/L  Abdomen: soft, NT/ND  Ext: no swelling or deformity  Neuro: 5/5 strength all extremities      ED Course  The patient is uncomfortable appearing but her physical exam is benign  She is hypertensive and tachypneic  Unclear etiology of her symptoms  Will repeat workup including troponin, dimer, BNP, CXR, COVID-19 swab, and basic labs  Analgesia administered  The patient will likely require admission to [facility]         Critical Care Time  Procedures

## 2021-04-30 NOTE — ASSESSMENT & PLAN NOTE
Patient presented with shortness of breath initially concerning for heart failure etiology given radiograph and cardiac serologies, however etiology appears unclear at this time  Shortness of breath seems secondary to bouts of abdominal pain  Additionally, she remains euvolemic on exam today without any signs or symptoms concerning for heart failure      Plan:  · Will continue holding off further diuresis  · TTE obtained yesterday, await official read, however as patient did have profoundly elevated NT proBNP with signs of LVH on presenting EKG, anticipate ejection fraction to be reduced and patient to require ischemic evaluation, either in the inpatient or outpatient setting  · -See CHF

## 2021-04-30 NOTE — ASSESSMENT & PLAN NOTE
Patient with a blood pressure of 198/111 on arrival   Lab work indicating no signs of end-organ damage  Blood pressures are improving on antihypertensive      Plan:  · Labetalol 10 mg as needed every 6 hours for SBP greater than 180  · Continue amlodipine 5 mg daily  · Monitor pressures closely

## 2021-04-30 NOTE — ED PROVIDER NOTES
History  Chief Complaint   Patient presents with    Abdominal Pain     Abdominal pain radiating to the left flank, chest and back  Denies N/V/D  Going on for 2 weeks     64 y/o female presents to the ED via EMS for evaluation of left lateral chest wall pain, dyspena, and back pain x 2 weeks  Her symptoms are gradual in onset and worsening  She describes her chest pain as sharp, in the left lateral chest wall and radiating across her back  She was seen at [facility] 10 days ago for these symptoms and had a work up that showed Trop elevation to 0 06, BNP 4,138, D-dimer 2 17 with a negative CT PE protocol  She was admitted at that time however she then left AMA (she states that she was boarding in the ER and felt that she could manage her symptoms better at home)  Symptoms have continued to gradually worsen over the last 10 days  Today she is amenable to staying for further evaluation  None       Past Medical History:   Diagnosis Date    Hyperlipidemia     RSD (reflex sympathetic dystrophy)        Past Surgical History:   Procedure Laterality Date    BREAST SURGERY         History reviewed  No pertinent family history  I have reviewed and agree with the history as documented  E-Cigarette/Vaping    E-Cigarette Use Never User      E-Cigarette/Vaping Substances     Social History     Tobacco Use    Smoking status: Current Every Day Smoker     Packs/day: 1 00     Types: Cigarettes    Smokeless tobacco: Never Used   Substance Use Topics    Alcohol use: No    Drug use: No        Review of Systems   Constitutional: Negative for chills and fever  HENT: Negative for congestion and sore throat  Respiratory: Positive for shortness of breath  Negative for cough and wheezing  Cardiovascular: Positive for chest pain  Negative for palpitations and leg swelling  Gastrointestinal: Negative for abdominal pain, diarrhea, nausea and vomiting  Genitourinary: Negative for dysuria and hematuria  Musculoskeletal: Positive for back pain  Negative for neck pain  Neurological: Negative for dizziness, weakness, light-headedness, numbness and headaches  All other systems reviewed and are negative  Physical Exam  ED Triage Vitals   Temperature Pulse Respirations Blood Pressure SpO2   04/30/21 1332 04/30/21 1325 04/30/21 1325 04/30/21 1325 04/30/21 1325   98 4 °F (36 9 °C) 100 18 (!) 198/111 97 %      Temp Source Heart Rate Source Patient Position - Orthostatic VS BP Location FiO2 (%)   04/30/21 1332 04/30/21 1325 -- 04/30/21 1325 --   Oral Monitor  Right arm       Pain Score       04/30/21 1416       Worst Possible Pain             Orthostatic Vital Signs  Vitals:    04/30/21 1325 04/30/21 1559 04/30/21 1611   BP: (!) 198/111 164/100 164/100   Pulse: 100 92 80       Physical Exam  Vitals signs and nursing note reviewed  Constitutional:       Appearance: She is well-developed and normal weight  Comments: Adult female, lying in bed, appears uncomfortable and in mild distress  HENT:      Head: Normocephalic and atraumatic  Mouth/Throat:      Mouth: Mucous membranes are moist       Pharynx: Oropharynx is clear  Eyes:      Extraocular Movements: Extraocular movements intact  Pupils: Pupils are equal, round, and reactive to light  Cardiovascular:      Rate and Rhythm: Normal rate and regular rhythm  Heart sounds: Normal heart sounds  No murmur  Pulmonary:      Effort: Pulmonary effort is normal  No respiratory distress  Breath sounds: Normal breath sounds  No wheezing or rales  Comments: No reproducible chest wall tenderness on palpation  Symmetric chest rise  No palpable bony defect of the chest wall  No crepitus  Chest:      Chest wall: No tenderness  Abdominal:      General: Abdomen is flat  Bowel sounds are normal       Palpations: Abdomen is soft  Tenderness: There is no abdominal tenderness   There is no right CVA tenderness, left CVA tenderness or guarding  Skin:     General: Skin is warm and dry  Capillary Refill: Capillary refill takes less than 2 seconds  Neurological:      General: No focal deficit present  Mental Status: She is alert and oriented to person, place, and time  Cranial Nerves: No cranial nerve deficit  Motor: No weakness  ED Medications  Medications   morphine (PF) 4 mg/mL injection 4 mg (4 mg Intravenous Given 4/30/21 1416)       Diagnostic Studies  Results Reviewed     Procedure Component Value Units Date/Time    Novel Coronavirus (Covid-19),PCR [facility] - 2 Hour Stat [[id number]]  (Normal) Collected: 04/30/21 1416    Lab Status: Final result Specimen: Nares from Nose Updated: 04/30/21 1524     SARS-CoV-2 Negative    Narrative: The specimen collection materials, transport medium, and/or testing methodology utilized in the production of these test results have been proven to be reliable in a limited validation with an abbreviated program under the Emergency Utilization Authorization provided by the FDA  Testing reported as "Presumptive positive" will be confirmed with secondary testing to ensure result accuracy  Clinical caution and judgement should be used with the interpretation of these results with consideration of the clinical impression and other laboratory testing  Testing reported as "Positive" or "Negative" has been proven to be accurate according to standard laboratory validation requirements  All testing is performed with control materials showing appropriate reactivity at standard intervals        D-Dimer [[id number]]  (Abnormal) Collected: 04/30/21 1416    Lab Status: Final result Specimen: Blood from Arm, Left Updated: 04/30/21 1453     D-Dimer, Quant 3 09 ug/ml FEU     NT-BNP PRO [[id number]]  (Abnormal) Collected: 04/30/21 1408    Lab Status: Final result Specimen: Blood from Arm, Left Updated: 04/30/21 1442     NT-proBNP 6,281 pg/mL     Comprehensive metabolic panel [[id number]]  (Abnormal) Collected: 04/30/21 1408    Lab Status: Final result Specimen: Blood from Arm, Left Updated: 04/30/21 1442     Sodium 141 mmol/L      Potassium 3 4 mmol/L      Chloride 111 mmol/L      CO2 24 mmol/L      ANION GAP 6 mmol/L      BUN 15 mg/dL      Creatinine 0 99 mg/dL      Glucose 117 mg/dL      Calcium 9 4 mg/dL      Corrected Calcium 10 1 mg/dL      AST 25 U/L      ALT 37 U/L      Alkaline Phosphatase 125 U/L      Total Protein 8 1 g/dL      Albumin 3 1 g/dL      Total Bilirubin 0 59 mg/dL      eGFR 63 ml/min/1 73sq m     Narrative:      National Kidney Disease Foundation guidelines for Chronic Kidney Disease (CKD):     Stage 1 with normal or high GFR (GFR > 90 mL/min/1 73 square meters)    Stage 2 Mild CKD (GFR = 60-89 mL/min/1 73 square meters)    Stage 3A Moderate CKD (GFR = 45-59 mL/min/1 73 square meters)    Stage 3B Moderate CKD (GFR = 30-44 mL/min/1 73 square meters)    Stage 4 Severe CKD (GFR = 15-29 mL/min/1 73 square meters)    Stage 5 End Stage CKD (GFR <15 mL/min/1 73 square meters)  Note: GFR calculation is accurate only with a steady state creatinine    Troponin I [[id number]]  (Normal) Collected: 04/30/21 1408    Lab Status: Final result Specimen: Blood from Arm, Left Updated: 04/30/21 1440     Troponin I 0 03 ng/mL     CBC and differential [[id number]]  (Abnormal) Collected: 04/30/21 1408    Lab Status: Final result Specimen: Blood from Arm, Left Updated: 04/30/21 1421     WBC 8 58 Thousand/uL      RBC 4 10 Million/uL      Hemoglobin 12 4 g/dL      Hematocrit 37 6 %      MCV 92 fL      MCH 30 2 pg      MCHC 33 0 g/dL      RDW 12 9 %      MPV 10 9 fL      Platelets 101 Thousands/uL      nRBC 0 /100 WBCs      Neutrophils Relative 77 %      Immat GRANS % 0 %      Lymphocytes Relative 10 %      Monocytes Relative 10 %      Eosinophils Relative 2 %      Basophils Relative 1 %      Neutrophils Absolute 6 59 Thousands/µL      Immature Grans Absolute 0 03 Thousand/uL      Lymphocytes Absolute 0 88 Thousands/µL      Monocytes Absolute 0 86 Thousand/µL      Eosinophils Absolute 0 17 Thousand/µL      Basophils Absolute 0 05 Thousands/µL                  XR chest 1 view portable   ED Interpretation by Yash Connors MD (04/30 1605)   No acute cardiopulmonary disease  Final Result by Charlette Lizarraga MD (04/30 1616)      Cardiomegaly with vascular congestion likely on the basis of CHF  Workstation performed: [id number]               Procedures  Procedures      ED Course  ED Course as of Apr 30 1624 Fri Apr 30, 2021   1547 WBC: 8 58   1547 NT-proBNP(!): 6,281   1547 Troponin I: 0 03   1548 10 days ago was 2 17, negative CT PE study at that time  D-Dimer, Quant(!): 3 09   1548 SARS-COV-2: Negative   1550 Procedure Note: EKG  Date/Time: 04/30/21 2:26 PM   Interpreted by: Yash Connors MD  Indications / Diagnosis: CP  ECG reviewed by me, the ED Physician: yes   The EKG demonstrates:  Rhythm: normal sinus rhythm 92 bpm  Intervals: normal intervals  Axis: normal axis  QRS/Blocks: LVH  ST Changes: Nonspecific T wave inversions in the lateral leads, unchanged compared to EKG from 04/20/2021  MDM  Number of Diagnoses or Management Options  Back pain:   Chest pain:   Dyspnea:   Elevated brain natriuretic peptide (BNP) level:   Elevated d-dimer:   Hypertension:   Diagnosis management comments: 64 y/o female with hx of chronic pain presenting for evaluation of left lateral chest pain and dyspnea x 2 weeks  Gradual in onset and worsening  She was seen at [facility] 10 days ago for these symptoms and had a work up that showed Trop elevation to 0 06, BNP 4,138, D-dimer 2 17 with a negative CT PE protocol  She was admitted at that time however she then left AMA (she states that she was boarding in the ER and felt that she could manage her symptoms better at home)  Symptoms have continued to gradually worsen over the last 10 days   Today she is amenable to staying for further evaluation  On exam she is hypertensive (198/111 on arrival, now 164/100), tachypneic, afebrile with normal SpO2  Clear to auscultation  No reproducible chest wall or back tenderness on exam  Abdomen benign  CBC normal  Elevated NT-proBNP to 6,281  D dimer 3 09 (no hypoxia or tachycardia, did not re-imagine PE protocol)  Trop 0 03  Covid negative  CXR with no acute findings  Discussed findings and indications for admission with the patient and she understands and agrees  Disposition  Final diagnoses:   Chest pain   Dyspnea   Back pain   Hypertension   Elevated brain natriuretic peptide (BNP) level   Elevated d-dimer     Time reflects when diagnosis was documented in both MDM as applicable and the Disposition within this note     Time User Action Codes Description Comment    4/30/2021  4:22 PM [place] Add [R07 9] Chest pain     4/30/2021  4:22 PM [place] Add [R06 00] Dyspnea     4/30/2021  4:22 PM [place] Add [M54 9] Back pain     4/30/2021  4:23 PM [place] Add [I10] Hypertension     4/30/2021  4:23 PM [place] Add [R79 89] Elevated brain natriuretic peptide (BNP) level     4/30/2021  4:23 PM [place] Add [R79 89] Elevated d-dimer       ED Disposition     ED Disposition Condition Date/Time Comment    Admit Stable Fri Apr 30, 2021  4:22 PM Case was discussed with Dr Devora Hough, 57 A  [facility] admitting resident, and the patient's admission status was agreed to be Admission Status: observation status to the service of Dr Allyson Ghosh  Follow-up Information    None         Patient's Medications    No medications on file     No discharge procedures on file  PDMP Review     None           ED Provider  Attending physically available and evaluated Jacklyn Bravo I managed the patient along with the ED Attending      Electronically Signed by         Gerald Escobar MD  05/03/21 6947

## 2021-04-30 NOTE — ASSESSMENT & PLAN NOTE
Patient has not seen a physician in years  Last lipid panel in 2015 revealed a total cholesterol 230, triglycerides 163, HDL 43,   Patient is not on any medication as an outpatient  Lipid panel obtained on arrival shows total cholesterol 199, triglycerides 103, HDL 44,        Plan:  · ASCVD risk of 10 8%, can consider statin therapy as an outpatient or inpatient pending further results

## 2021-04-30 NOTE — RESPIRATORY THERAPY NOTE
RT Protocol Note  Deon Traylor 63 y o  female MRN: [id number]  Unit/Bed#: ED 11 Encounter: [id number]    Assessment    Principal Problem:    Shortness of breath  Active Problems:    Hyperlipidemia    Hypertensive urgency    Positive D dimer    Abnormal finding on imaging of liver      Home Pulmonary Medications:  none       Past Medical History:   Diagnosis Date    Hyperlipidemia     RSD (reflex sympathetic dystrophy)      Social History     Socioeconomic History    Marital status:      Spouse name: None    Number of children: None    Years of education: None    Highest education level: None   Occupational History    None   Social Needs    Financial resource strain: None    Food insecurity     Worry: None     Inability: None    Transportation needs     Medical: None     Non-medical: None   Tobacco Use    Smoking status: Current Every Day Smoker     Packs/day: 1 00     Types: Cigarettes    Smokeless tobacco: Never Used   Substance and Sexual Activity    Alcohol use: No    Drug use: No    Sexual activity: None   Lifestyle    Physical activity     Days per week: None     Minutes per session: None    Stress: None   Relationships    Social connections     Talks on phone: None     Gets together: None     Attends Rastafarian service: None     Active member of club or organization: None     Attends meetings of clubs or organizations: None     Relationship status: None    Intimate partner violence     Fear of current or ex partner: None     Emotionally abused: None     Physically abused: None     Forced sexual activity: None   Other Topics Concern    None   Social History Narrative    None       Subjective         Objective    Physical Exam:   Assessment Type: (P) Assess only  General Appearance: (P) Alert, Awake  Respiratory Pattern: (P) Dyspnea with exertion  Chest Assessment: (P) Chest expansion symmetrical  Bilateral Breath Sounds: (P) Diminished  Cough: (P) Productive  O2 Device: (P) rma    Vitals:  Blood pressure (!) 162/109, pulse 86, temperature 98 4 °F (36 9 °C), temperature source Oral, resp  rate 18, height 5' 4" (1 626 m), weight 56 7 kg (125 lb), SpO2 97 %  Imaging and other studies: I have personally reviewed pertinent reports  O2 Device: (P) rma     Plan    Respiratory Plan: (P) Discontinue Protocol        Resp Comments: (P) Pt  admitted for SOB  CXR shows vascular congestion  pt  is a current every day smoker  No pulm meds at home  lung nodules seen on CT  Pt c/o SINGH  BS are diminished  Will contiue prn MDI for SOB  No further resp  therapy indicated  Nora Erwni

## 2021-04-30 NOTE — ASSESSMENT & PLAN NOTE
Patient with a positive D-dimer from her previous admission at [facility] 10 days ago at 2 17   CT PE protocol negative  Patient's D-dimer on admission 3 09  Doubt that her dyspnea is secondary to any new pulmonary embolism  Patient is not tachycardic, no ECG findings suggesting new PE  No history of DVT or PE      Plan:  · Will plan to treat new onset heart failure as above

## 2021-05-01 ENCOUNTER — APPOINTMENT (OUTPATIENT)
Dept: NON INVASIVE DIAGNOSTICS | Facility: HOSPITAL | Age: 61
DRG: 469 | End: 2021-05-01
Payer: COMMERCIAL

## 2021-05-01 PROBLEM — F17.200 SMOKER: Status: ACTIVE | Noted: 2021-05-01

## 2021-05-01 PROBLEM — I10 HYPERTENSION: Status: ACTIVE | Noted: 2021-05-01

## 2021-05-01 LAB
ANION GAP SERPL CALCULATED.3IONS-SCNC: 7 MMOL/L (ref 4–13)
BUN SERPL-MCNC: 21 MG/DL (ref 5–25)
CALCIUM SERPL-MCNC: 9.3 MG/DL (ref 8.3–10.1)
CHLORIDE SERPL-SCNC: 108 MMOL/L (ref 100–108)
CO2 SERPL-SCNC: 25 MMOL/L (ref 21–32)
CREAT SERPL-MCNC: 1.13 MG/DL (ref 0.6–1.3)
ERYTHROCYTE [DISTWIDTH] IN BLOOD BY AUTOMATED COUNT: 12.9 % (ref 11.6–15.1)
GFR SERPL CREATININE-BSD FRML MDRD: 53 ML/MIN/1.73SQ M
GLUCOSE SERPL-MCNC: 122 MG/DL (ref 65–140)
HBV CORE AB SER QL: ABNORMAL
HBV CORE IGM SER QL: ABNORMAL
HBV SURFACE AG SER QL: ABNORMAL
HCT VFR BLD AUTO: 37.1 % (ref 34.8–46.1)
HCV AB SER QL: ABNORMAL
HGB BLD-MCNC: 12.3 G/DL (ref 11.5–15.4)
MAGNESIUM SERPL-MCNC: 2.3 MG/DL (ref 1.6–2.6)
MCH RBC QN AUTO: 30.4 PG (ref 26.8–34.3)
MCHC RBC AUTO-ENTMCNC: 33.2 G/DL (ref 31.4–37.4)
MCV RBC AUTO: 92 FL (ref 82–98)
PHOSPHATE SERPL-MCNC: 3.7 MG/DL (ref 2.7–4.5)
PLATELET # BLD AUTO: 300 THOUSANDS/UL (ref 149–390)
PMV BLD AUTO: 11.5 FL (ref 8.9–12.7)
POTASSIUM SERPL-SCNC: 3.8 MMOL/L (ref 3.5–5.3)
RBC # BLD AUTO: 4.04 MILLION/UL (ref 3.81–5.12)
SODIUM SERPL-SCNC: 140 MMOL/L (ref 136–145)
WBC # BLD AUTO: 8 THOUSAND/UL (ref 4.31–10.16)

## 2021-05-01 PROCEDURE — 80048 BASIC METABOLIC PNL TOTAL CA: CPT | Performed by: STUDENT IN AN ORGANIZED HEALTH CARE EDUCATION/TRAINING PROGRAM

## 2021-05-01 PROCEDURE — 86704 HEP B CORE ANTIBODY TOTAL: CPT | Performed by: INTERNAL MEDICINE

## 2021-05-01 PROCEDURE — 93306 TTE W/DOPPLER COMPLETE: CPT

## 2021-05-01 PROCEDURE — 83735 ASSAY OF MAGNESIUM: CPT | Performed by: STUDENT IN AN ORGANIZED HEALTH CARE EDUCATION/TRAINING PROGRAM

## 2021-05-01 PROCEDURE — 99232 SBSQ HOSP IP/OBS MODERATE 35: CPT | Performed by: HOSPITALIST

## 2021-05-01 PROCEDURE — 86705 HEP B CORE ANTIBODY IGM: CPT | Performed by: INTERNAL MEDICINE

## 2021-05-01 PROCEDURE — 87340 HEPATITIS B SURFACE AG IA: CPT | Performed by: INTERNAL MEDICINE

## 2021-05-01 PROCEDURE — 93975 VASCULAR STUDY: CPT

## 2021-05-01 PROCEDURE — 85027 COMPLETE CBC AUTOMATED: CPT | Performed by: STUDENT IN AN ORGANIZED HEALTH CARE EDUCATION/TRAINING PROGRAM

## 2021-05-01 PROCEDURE — 86803 HEPATITIS C AB TEST: CPT | Performed by: INTERNAL MEDICINE

## 2021-05-01 PROCEDURE — 84100 ASSAY OF PHOSPHORUS: CPT | Performed by: STUDENT IN AN ORGANIZED HEALTH CARE EDUCATION/TRAINING PROGRAM

## 2021-05-01 PROCEDURE — 93306 TTE W/DOPPLER COMPLETE: CPT | Performed by: INTERNAL MEDICINE

## 2021-05-01 RX ORDER — SENNOSIDES 8.6 MG
1 TABLET ORAL
Status: DISCONTINUED | OUTPATIENT
Start: 2021-05-01 | End: 2021-05-07 | Stop reason: HOSPADM

## 2021-05-01 RX ORDER — AMLODIPINE BESYLATE 5 MG/1
5 TABLET ORAL DAILY
Status: DISCONTINUED | OUTPATIENT
Start: 2021-05-01 | End: 2021-05-03

## 2021-05-01 RX ORDER — LISINOPRIL 10 MG/1
10 TABLET ORAL DAILY
Status: DISCONTINUED | OUTPATIENT
Start: 2021-05-01 | End: 2021-05-01

## 2021-05-01 RX ORDER — PANTOPRAZOLE SODIUM 40 MG/1
40 TABLET, DELAYED RELEASE ORAL
Status: DISCONTINUED | OUTPATIENT
Start: 2021-05-01 | End: 2021-05-07 | Stop reason: HOSPADM

## 2021-05-01 RX ORDER — LANOLIN ALCOHOL/MO/W.PET/CERES
3 CREAM (GRAM) TOPICAL
Status: DISCONTINUED | OUTPATIENT
Start: 2021-05-01 | End: 2021-05-07 | Stop reason: HOSPADM

## 2021-05-01 RX ORDER — DICYCLOMINE HCL 20 MG
20 TABLET ORAL ONCE
Status: COMPLETED | OUTPATIENT
Start: 2021-05-01 | End: 2021-05-01

## 2021-05-01 RX ADMIN — AMLODIPINE BESYLATE 5 MG: 5 TABLET ORAL at 14:04

## 2021-05-01 RX ADMIN — PERFLUTREN 0.6 ML/MIN: 6.52 INJECTION, SUSPENSION INTRAVENOUS at 16:14

## 2021-05-01 RX ADMIN — DICYCLOMINE HYDROCHLORIDE 20 MG: 20 TABLET ORAL at 16:51

## 2021-05-01 RX ADMIN — FUROSEMIDE 40 MG: 10 INJECTION, SOLUTION INTRAMUSCULAR; INTRAVENOUS at 08:15

## 2021-05-01 RX ADMIN — NICOTINE 7 MG/24 HR DAILY TRANSDERMAL PATCH 1 PATCH: at 08:22

## 2021-05-01 RX ADMIN — SENNOSIDES 8.6 MG: 8.6 TABLET, FILM COATED ORAL at 23:48

## 2021-05-01 RX ADMIN — MELATONIN TAB 3 MG 3 MG: 3 TAB at 23:48

## 2021-05-01 RX ADMIN — ENOXAPARIN SODIUM 40 MG: 40 INJECTION SUBCUTANEOUS at 14:06

## 2021-05-01 RX ADMIN — DICYCLOMINE HYDROCHLORIDE 20 MG: 20 TABLET ORAL at 06:28

## 2021-05-01 RX ADMIN — LISINOPRIL 10 MG: 10 TABLET ORAL at 09:45

## 2021-05-01 RX ADMIN — DICYCLOMINE HYDROCHLORIDE 20 MG: 20 TABLET ORAL at 23:49

## 2021-05-01 RX ADMIN — PANTOPRAZOLE SODIUM 40 MG: 40 TABLET, DELAYED RELEASE ORAL at 14:06

## 2021-05-01 RX ADMIN — DICYCLOMINE HYDROCHLORIDE 20 MG: 20 TABLET ORAL at 14:05

## 2021-05-01 NOTE — ASSESSMENT & PLAN NOTE
Unclear etiology  Does have findings of atherosclerotic abdominal vessels on recent CT; intermittent abdominal pain although not clearly associated with food intake  Possible early cirrhosis on recent CT scan, however no significant ascites or abdominal distention  Tolerating diet well without nausea/vomiting/diarrhea/constipation  · Will continue Bentyl for now, monitor for improvement  · Initiate pantoprazole 40 mg daily p o  Thony Caitlyn · Mesenteric duplex order, follow results

## 2021-05-01 NOTE — ASSESSMENT & PLAN NOTE
Possible early cirrhosis noted on CT scan; recommend outpatient elastography for better definition of liver parenchyma  Order hepatitis-B and C panel

## 2021-05-01 NOTE — ASSESSMENT & PLAN NOTE
Unclear etiology, however previously elevated during presentation to [facility] where she underwent CTA chest which was negative for PE, no sign or symptoms consistent with DVT

## 2021-05-01 NOTE — ASSESSMENT & PLAN NOTE
Unclear etiology; patient appears euvolemic during our exam however chest x-ray report being read as vascular congestion and BNP is significantly elevated  No signs of orthopnea, lower extremity swelling, palpitations, appears clinically euvolemic; without significant work of breathing while lying flat  · Will discontinue IV Lasix, monitor respiratory status  · Follow transthoracic echocardiogram results

## 2021-05-01 NOTE — UTILIZATION REVIEW
Initial Clinical Review    Admission: Date/Time/Statement:   Admission Orders (From admission, onward)     Ordered        04/30/21 1624  Place in Observation  Once                   Orders Placed This Encounter   Procedures    Place in Observation     Standing Status:   Standing     Number of Occurrences:   1     Order Specific Question:   Level of Care     Answer:   Med Surg [16]     ED Arrival Information     Expected Arrival Acuity Means of Arrival Escorted By Service Admission Type    - 4/30/2021 13:19 Urgent Ambulance [facility] Ambulance General Medicine Urgent    Arrival Complaint    SOB        Chief Complaint   Patient presents with    Abdominal Pain     Abdominal pain radiating to the left flank, chest and back  Denies N/V/D  Going on for 2 weeks       Initial Presentation: 64 y/o female with PMHx of HLD, chronic pain, current smoker, no medical f/u in years, who presents to ED by EMS with c/o dspnea and abdominal pain radiating around to her back as well as chest tightness  PT seen in ED 10 days ago with same sxs, admitted for w/u but left AMA  The symptoms have been worsening over the past 10 days, especially her dyspnea on exertion  The patient endorses early satiety and some abdominal distention  On exam, (+) JVD, (+) hepatojugular reflux  B/L wheezing, B/L rales at bases, abd distention and tenderness in suprapubic, LUQ, LLQ  K 3 4, , Alk phos 125  /111  Admit observation to M/S/Tele unit with new onset heart failure and hypertensive urgency-- Telem monitoring,  IV lasix, strict I/O's  2 g sodium restriction, 1800 ml/day fluid restriction  TTE ordered  Bentyl and Tums prn  Lipase ordered  Labetalol 10 mg prn q 6hr for SBP >180  Date: 5/1   Day 2:  C/o chronic abdominal pain which has been ongoing for about 2 weeks associated with shortness of breath present even at rest  Tole diet  Continue bentyl for now  Pantoprazole po daily  Mesenteric duplex pending    BP remains elevated, add amlodipine  Hep panel ordered  D/c IV lasix  Echo pending      ED Triage Vitals   Temperature Pulse Respirations Blood Pressure SpO2   04/30/21 1332 04/30/21 1325 04/30/21 1325 04/30/21 1325 04/30/21 1325   98 4 °F (36 9 °C) 100 18 (!) 198/111 97 %      Temp Source Heart Rate Source Patient Position - Orthostatic VS BP Location FiO2 (%)   04/30/21 1332 04/30/21 1325 04/30/21 2051 04/30/21 1325 --   Oral Monitor Sitting Right arm       Pain Score       04/30/21 1416       Worst Possible Pain          Wt Readings from Last 1 Encounters:   04/30/21 68 8 kg (151 lb 9 6 oz)     Additional Vital Signs:   Date/Time  Temp  Pulse  Resp  BP  MAP (mmHg)  SpO2  O2 Device  Patient Position - Orthostatic VS   05/01/21 07:23:20  98 9 °F (37 2 °C)  82  17  156/96  116  96 %  --  --   05/01/21 03:29:13  --  84  --  155/96  116  95 %  --  --   05/01/21 00:06:49  98 7 °F (37 1 °C)  80  18  140/95  110  95 %  None (Room air)  --   04/30/21 20:51:09  99 2 °F (37 3 °C)  90  20  152/106Abnormal   121  95 %  None (Room air)  Sitting   04/30/21 1816  --  86  18  162/109Abnormal   --  97 %  --  --   04/30/21 1815  --  88  --  --  --  98 %  None (Room air)  --   04/30/21 1611  --  80  18  164/100  125  94 %  --  --   04/30/21 1559  --  92  --  164/100  --  --  --  --   04/30/21 1332  98 4 °F (36 9 °C)  --  --  --  --  --  --  --   04/30/21 1325  --  100  18  198/111Abnormal   --  97 %  None (Room air)  --       Pertinent Labs/Diagnostic Test Results:   CXR 4/30 -- Cardiomegaly with vascular congestion likely on the basis of CHF    EKG 4/30 -- Normal sinus rhythm  Left ventricular hypertrophy with repolarization abnormality  Abnormal ECG    Results from last 7 days   Lab Units 04/30/21  1416   SARS-COV-2  Negative     Results from last 7 days   Lab Units 05/01/21  0604 04/30/21  1408   WBC Thousand/uL 8 00 8 58   HEMOGLOBIN g/dL 12 3 12 4   HEMATOCRIT % 37 1 37 6   PLATELETS Thousands/uL 300 288   NEUTROS ABS Thousands/µL  -- 6 59       Results from last 7 days   Lab Units 05/01/21  0604 04/30/21  1408   SODIUM mmol/L 140 141   POTASSIUM mmol/L 3 8 3 4*   CHLORIDE mmol/L 108 111*   CO2 mmol/L 25 24   ANION GAP mmol/L 7 6   BUN mg/dL 21 15   CREATININE mg/dL 1 13 0 99   EGFR ml/min/1 73sq m 53 63   CALCIUM mg/dL 9 3 9 4   MAGNESIUM mg/dL 2 3 2 3   PHOSPHORUS mg/dL 3 7  --      Results from last 7 days   Lab Units 04/30/21  1408   AST U/L 25   ALT U/L 37   ALK PHOS U/L 125*   TOTAL PROTEIN g/dL 8 1   ALBUMIN g/dL 3 1*   TOTAL BILIRUBIN mg/dL 0 59     Results from last 7 days   Lab Units 05/01/21  0604 04/30/21  1408   GLUCOSE RANDOM mg/dL 122 117     Results from last 7 days   Lab Units 04/30/21  1408   HEMOGLOBIN A1C % 5 4   EAG mg/dl 108     Results from last 7 days   Lab Units 04/30/21  1408   TROPONIN I ng/mL 0 03     Results from last 7 days   Lab Units 04/30/21  1416   D-DIMER QUANTITATIVE ug/ml FEU 3 09*     Results from last 7 days   Lab Units 04/30/21  1408   TSH 3RD GENERATON uIU/mL 3 610     Results from last 7 days   Lab Units 04/30/21  1408   NT-PRO BNP pg/mL 6,281*       Results from last 7 days   Lab Units 04/30/21  1408   LIPASE u/L 93         ED Treatment:   Medication Administration from 04/30/2021 1319 to 04/30/2021 2047       Date/Time Order Dose Route Action     04/30/2021 1416 morphine (PF) 4 mg/mL injection 4 mg 4 mg Intravenous Given     04/30/2021 1805 acetaminophen (TYLENOL) tablet 650 mg 650 mg Oral Given     04/30/2021 1803 potassium chloride (K-DUR,KLOR-CON) CR tablet 40 mEq 40 mEq Oral Given     04/30/2021 1805 dicyclomine (BENTYL) tablet 20 mg 20 mg Oral Given     04/30/2021 1805 furosemide (LASIX) injection 40 mg 40 mg Intravenous Given     Past Medical History:   Diagnosis Date    Hyperlipidemia     RSD (reflex sympathetic dystrophy)      Present on Admission:   Hyperlipidemia   Shortness of breath   Hypertensive urgency      Admitting Diagnosis: Back pain [M54 9]  Chest pain [R07 9]  Dyspnea [R06 00]  SOB (shortness of breath) [R06 02]  Hypertension [I10]  Elevated brain natriuretic peptide (BNP) level [R79 89]  Elevated d-dimer [R79 89]  Age/Sex: 63 y o  female  Admission Orders:  Scheduled Medications:  dicyclomine, 20 mg, Oral, TID AC  furosemide, 40 mg, Intravenous, Daily  lisinopril, 10 mg, Oral, Daily  nicotine, 1 patch, Transdermal, Daily    PRN Meds:  acetaminophen, 975 mg, Oral, Q6H PRN 4/30 x1  albuterol, 2 puff, Inhalation, Q4H PRN  calcium carbonate, 1,000 mg, Oral, Daily PRN  ketorolac, 30 mg, Intravenous, Q6H PRN 4/30 x1  Labetalol HCl, 10 mg, Intravenous, Q6H PRN      Network Utilization Review Department  ATTENTION: Please call with any questions or concerns to [phone] and carefully listen to the prompts so that you are directed to the right person  All voicemails are confidential   Madhu Glez all requests for admission clinical reviews, approved or denied determinations and any other requests to dedicated fax number below belonging to the campus where the patient is receiving treatment   List of dedicated fax numbers for the Facilities:  [address] DENIALS (Administrative/Medical Necessity) [phone]   [address] (Maternity/NICU/Pediatrics) [phone]   [id number] [place] [address] [address] [facility] [facility] [phone]

## 2021-05-01 NOTE — ASSESSMENT & PLAN NOTE
Dyslipidemia noted on lipid panel  ASCVD risk of 10 8%, outpatient follow-up for consideration of statin therapy    Will follow-up mesenteric duplex as well

## 2021-05-01 NOTE — PROGRESS NOTES
[address] [facility]  Progress Note - Kira Miller 1960, 63 y o  female MRN: [id number]  Unit/Bed#: CW2 210-02 Encounter: [id number]  Primary Care Provider: No primary care provider on file  Date and time admitted to hospital: 4/30/2021  1:21 PM    Hypertension  Assessment & Plan  Uncontrolled hypertension on admission likely exacerbated by underlying abdominal pain  Will add amlodipine 5 mg daily, monitor vital signs  Smoker  Assessment & Plan  Possible underlying mild emphysema on imaging  Asymptomatic at this moment  Abnormal finding on imaging of liver  Assessment & Plan  Possible early cirrhosis noted on CT scan; recommend outpatient elastography for better definition of liver parenchyma  Order hepatitis-B and C panel  Positive D dimer  Assessment & Plan  Unclear etiology, however previously elevated during presentation to [facility] where she underwent CTA chest which was negative for PE, no sign or symptoms consistent with DVT  Pulmonary nodule  Assessment & Plan  5 mm pulmonary nodule in the right middle lobe in a smoker; outpatient follow-up CT recommended  Hyperlipidemia  Assessment & Plan  Dyslipidemia noted on lipid panel  ASCVD risk of 10 8%, outpatient follow-up for consideration of statin therapy  Will follow-up mesenteric duplex as well    Abdominal pain  Assessment & Plan  Unclear etiology  Does have findings of atherosclerotic abdominal vessels on recent CT; intermittent abdominal pain although not clearly associated with food intake  Possible early cirrhosis on recent CT scan, however no significant ascites or abdominal distention  Tolerating diet well without nausea/vomiting/diarrhea/constipation  · Will continue Bentyl for now, monitor for improvement  · Initiate pantoprazole 40 mg daily p o  Joel Landry · Mesenteric duplex order, follow results        * Shortness of breath  Assessment & Plan  Unclear etiology; patient appears euvolemic during our exam however chest x-ray report being read as vascular congestion and BNP is significantly elevated  No signs of orthopnea, lower extremity swelling, palpitations, appears clinically euvolemic; without significant work of breathing while lying flat  · Will discontinue IV Lasix, monitor respiratory status  · Follow transthoracic echocardiogram results  Disposition: Anticipated discharge possibly within next 24-48 hours pending evaluation of underlying symptoms, including imaging studies  VTE Prophylaxis - Enoxaparin (Lovenox)    Education and Discussions with Family / Patient:  Discussed with patient at bedside    Current Length of Stay: 0 day(s)  Current Patient Status: Observation     Code Status: Level 3 - DNAR and DNI      Subjective:   Seen and examined at bedside  Complaining of chronic abdominal pain which has been ongoing for about 2 weeks associated with shortness of breath present even at rest, no increased lower extremity swelling, chest pain, orthopnea, palpitations, GI or  changes including nausea/vomiting/diarrhea or constipation  Objective:     Vitals:   Temp (24hrs), Av 8 °F (37 1 °C), Min:98 4 °F (36 9 °C), Max:99 2 °F (37 3 °C)    Temp:  [98 4 °F (36 9 °C)-99 2 °F (37 3 °C)] 98 9 °F (37 2 °C)  HR:  [] 82  Resp:  [17-20] 17  BP: (140-198)/() 155/98  SpO2:  [94 %-98 %] 96 %  Body mass index is 26 02 kg/m²  Invasive Devices     Peripheral Intravenous Line            Peripheral IV 21 Right Forearm less than 1 day                Input and Output Summary (last 24 hours): Intake/Output Summary (Last 24 hours) at 2021 1144  Last data filed at 2021 0723  Gross per 24 hour   Intake 160 ml   Output --   Net 160 ml       Physical Exam:     Gen: in bed; room air  HEENT: NC/AT, PERRLA, no scleral icterus, no JVD    Moist mucous membranes  RS: symmetric, speaking in full sentences, CTA bilaterally  CVS:  RRR, S1-S2 heard without murmurs, no peripheral edema, radial pulses 2+, capillary refill less than 2 seconds  Abdomen:  Soft, nondistended, mild diffuse tenderness best noted in left upper quadrant, bowel sounds heard  No rebound tenderness or involuntary guarding  MSK:  Warm  Moves all 4 extremities  :  No [place]  Neuro:  AAO x4  Psych:  Cooperative        Additional Data:     Labs:    Results from last 7 days   Lab Units 05/01/21  0604 04/30/21  1408   WBC Thousand/uL 8 00 8 58   HEMOGLOBIN g/dL 12 3 12 4   HEMATOCRIT % 37 1 37 6   PLATELETS Thousands/uL 300 288   NEUTROS PCT %  --  77*   LYMPHS PCT %  --  10*   MONOS PCT %  --  10   EOS PCT %  --  2     Results from last 7 days   Lab Units 05/01/21  0604 04/30/21  1408   POTASSIUM mmol/L 3 8 3 4*   CHLORIDE mmol/L 108 111*   CO2 mmol/L 25 24   BUN mg/dL 21 15   CREATININE mg/dL 1 13 0 99   CALCIUM mg/dL 9 3 9 4   ALK PHOS U/L  --  125*   ALT U/L  --  37   AST U/L  --  25           * I Have Reviewed All Lab Data Listed Above  * Additional Pertinent Lab Tests Reviewed: All Labs For Current Hospital Admission Reviewed    Imaging:    Imaging Reports Reviewed Today Include:  CTA chest/abdomen/pelvis  Chest x-ray  Imaging Personally Reviewed by Myself Includes:  CTA chest/abdomen/pelvis    Chest x-ray    Recent Cultures (last 7 days):           Last 24 Hours Medication List:   Current Facility-Administered Medications   Medication Dose Route Frequency Provider Last Rate    acetaminophen  975 mg Oral Q6H PRN Marcel Ambrosia, DO      albuterol  2 puff Inhalation Q4H PRN Imelda Harding, DO      amLODIPine  5 mg Oral Daily Partha Osorio MD      calcium carbonate  1,000 mg Oral Daily PRN Taz Hemkelley, DO      dicyclomine  20 mg Oral TID AC Imelda Harding, DO      Labetalol HCl  10 mg Intravenous Q6H PRN Taz Hemming, DO      nicotine  1 patch Transdermal Daily Imelda Harding, DO      pantoprazole  40 mg Oral Early Morning Partha Osorio MD              ** Please Note: This note has been constructed using a voice recognition system   **

## 2021-05-01 NOTE — PLAN OF CARE
Problem: PAIN - ADULT  Goal: Verbalizes/displays adequate comfort level or baseline comfort level  Description: Interventions:  - Encourage patient to monitor pain and request assistance  - Assess pain using appropriate pain scale  - Administer analgesics based on type and severity of pain and evaluate response  - Implement non-pharmacological measures as appropriate and evaluate response  - Consider cultural and social influences on pain and pain management  - Notify physician/advanced practitioner if interventions unsuccessful or patient reports new pain  Outcome: Progressing     Problem: INFECTION - ADULT  Goal: Absence or prevention of progression during hospitalization  Description: INTERVENTIONS:  - Assess and monitor for signs and symptoms of infection  - Monitor lab/diagnostic results  - Monitor all insertion sites, i e  indwelling lines, tubes, and drains  - Monitor endotracheal if appropriate and nasal secretions for changes in amount and color  - [place] appropriate cooling/warming therapies per order  - Administer medications as ordered  - Instruct and encourage patient and family to use good hand hygiene technique  - Identify and instruct in appropriate isolation precautions for identified infection/condition  Outcome: Progressing  Goal: Absence of fever/infection during neutropenic period  Description: INTERVENTIONS:  - Monitor WBC    Outcome: Progressing     Problem: SAFETY ADULT  Goal: Patient will remain free of falls  Description: INTERVENTIONS:  - Assess patient frequently for physical needs  -  Identify cognitive and physical deficits and behaviors that affect risk of falls    -  [place] fall precautions as indicated by assessment   - Educate patient/family on patient safety including physical limitations  - Instruct patient to call for assistance with activity based on assessment  - Modify environment to reduce risk of injury  - Consider OT/PT consult to assist with strengthening/mobility  Outcome: Progressing  Goal: Maintain or return to baseline ADL function  Description: INTERVENTIONS:  -  Assess patient's ability to carry out ADLs; assess patient's baseline for ADL function and identify physical deficits which impact ability to perform ADLs (bathing, care of mouth/teeth, toileting, grooming, dressing, etc )  - Assess/evaluate cause of self-care deficits   - Assess range of motion  - Assess patient's mobility; develop plan if impaired  - Assess patient's need for assistive devices and provide as appropriate  - Encourage maximum independence but intervene and supervise when necessary  - Involve family in performance of ADLs  - Assess for home care needs following discharge   - Consider OT consult to assist with ADL evaluation and planning for discharge  - Provide patient education as appropriate  Outcome: Progressing  Goal: Maintain or return mobility status to optimal level  Description: INTERVENTIONS:  - Assess patient's baseline mobility status (ambulation, transfers, stairs, etc )    - Identify cognitive and physical deficits and behaviors that affect mobility  - Identify mobility aids required to assist with transfers and/or ambulation (gait belt, sit-to-stand, lift, walker, cane, etc )  - [place] fall precautions as indicated by assessment  - Record patient progress and toleration of activity level on Mobility SBAR; progress patient to next Phase/Stage  - Instruct patient to call for assistance with activity based on assessment  - Consider rehabilitation consult to assist with strengthening/weightbearing, etc   Outcome: Progressing     Problem: DISCHARGE PLANNING  Goal: Discharge to home or other facility with appropriate resources  Description: INTERVENTIONS:  - Identify barriers to discharge w/patient and caregiver  - Arrange for needed discharge resources and transportation as appropriate  - Identify discharge learning needs (meds, wound care, etc )  - Arrange for interpretive services to assist at discharge as needed  - Refer to Case Management Department for coordinating discharge planning if the patient needs post-hospital services based on physician/advanced practitioner order or complex needs related to functional status, cognitive ability, or social support system  Outcome: Progressing     Problem: Knowledge Deficit  Goal: Patient/family/caregiver demonstrates understanding of disease process, treatment plan, medications, and discharge instructions  Description: Complete learning assessment and assess knowledge base    Interventions:  - Provide teaching at level of understanding  - Provide teaching via preferred learning methods  Outcome: Progressing

## 2021-05-01 NOTE — ASSESSMENT & PLAN NOTE
Uncontrolled hypertension on admission likely exacerbated by underlying abdominal pain  Will add amlodipine 5 mg daily, monitor vital signs

## 2021-05-02 LAB
ALBUMIN SERPL BCP-MCNC: 3 G/DL (ref 3.5–5)
ALP SERPL-CCNC: 117 U/L (ref 46–116)
ALT SERPL W P-5'-P-CCNC: 38 U/L (ref 12–78)
ANION GAP SERPL CALCULATED.3IONS-SCNC: 4 MMOL/L (ref 4–13)
AST SERPL W P-5'-P-CCNC: 21 U/L (ref 5–45)
BASOPHILS # BLD AUTO: 0.05 THOUSANDS/ΜL (ref 0–0.1)
BASOPHILS NFR BLD AUTO: 1 % (ref 0–1)
BILIRUB SERPL-MCNC: 0.49 MG/DL (ref 0.2–1)
BUN SERPL-MCNC: 24 MG/DL (ref 5–25)
CALCIUM ALBUM COR SERPL-MCNC: 10.1 MG/DL (ref 8.3–10.1)
CALCIUM SERPL-MCNC: 9.3 MG/DL (ref 8.3–10.1)
CHLORIDE SERPL-SCNC: 104 MMOL/L (ref 100–108)
CO2 SERPL-SCNC: 30 MMOL/L (ref 21–32)
CREAT SERPL-MCNC: 1.13 MG/DL (ref 0.6–1.3)
EOSINOPHIL # BLD AUTO: 0.2 THOUSAND/ΜL (ref 0–0.61)
EOSINOPHIL NFR BLD AUTO: 2 % (ref 0–6)
ERYTHROCYTE [DISTWIDTH] IN BLOOD BY AUTOMATED COUNT: 12.9 % (ref 11.6–15.1)
GFR SERPL CREATININE-BSD FRML MDRD: 53 ML/MIN/1.73SQ M
GLUCOSE SERPL-MCNC: 116 MG/DL (ref 65–140)
HCT VFR BLD AUTO: 38 % (ref 34.8–46.1)
HGB BLD-MCNC: 12.8 G/DL (ref 11.5–15.4)
IMM GRANULOCYTES # BLD AUTO: 0.05 THOUSAND/UL (ref 0–0.2)
IMM GRANULOCYTES NFR BLD AUTO: 1 % (ref 0–2)
LYMPHOCYTES # BLD AUTO: 1.72 THOUSANDS/ΜL (ref 0.6–4.47)
LYMPHOCYTES NFR BLD AUTO: 19 % (ref 14–44)
MAGNESIUM SERPL-MCNC: 2.3 MG/DL (ref 1.6–2.6)
MCH RBC QN AUTO: 30.8 PG (ref 26.8–34.3)
MCHC RBC AUTO-ENTMCNC: 33.7 G/DL (ref 31.4–37.4)
MCV RBC AUTO: 91 FL (ref 82–98)
MONOCYTES # BLD AUTO: 1.36 THOUSAND/ΜL (ref 0.17–1.22)
MONOCYTES NFR BLD AUTO: 15 % (ref 4–12)
NEUTROPHILS # BLD AUTO: 5.78 THOUSANDS/ΜL (ref 1.85–7.62)
NEUTS SEG NFR BLD AUTO: 62 % (ref 43–75)
NRBC BLD AUTO-RTO: 0 /100 WBCS
PLATELET # BLD AUTO: 324 THOUSANDS/UL (ref 149–390)
PMV BLD AUTO: 10.5 FL (ref 8.9–12.7)
POTASSIUM SERPL-SCNC: 3.5 MMOL/L (ref 3.5–5.3)
PROT SERPL-MCNC: 8.1 G/DL (ref 6.4–8.2)
RBC # BLD AUTO: 4.16 MILLION/UL (ref 3.81–5.12)
SODIUM SERPL-SCNC: 138 MMOL/L (ref 136–145)
WBC # BLD AUTO: 9.16 THOUSAND/UL (ref 4.31–10.16)

## 2021-05-02 PROCEDURE — 93975 VASCULAR STUDY: CPT | Performed by: SURGERY

## 2021-05-02 PROCEDURE — 87522 HEPATITIS C REVRS TRNSCRPJ: CPT | Performed by: STUDENT IN AN ORGANIZED HEALTH CARE EDUCATION/TRAINING PROGRAM

## 2021-05-02 PROCEDURE — 99232 SBSQ HOSP IP/OBS MODERATE 35: CPT | Performed by: HOSPITALIST

## 2021-05-02 PROCEDURE — 80053 COMPREHEN METABOLIC PANEL: CPT | Performed by: INTERNAL MEDICINE

## 2021-05-02 PROCEDURE — 83735 ASSAY OF MAGNESIUM: CPT | Performed by: INTERNAL MEDICINE

## 2021-05-02 PROCEDURE — 85025 COMPLETE CBC W/AUTO DIFF WBC: CPT | Performed by: INTERNAL MEDICINE

## 2021-05-02 RX ORDER — DICYCLOMINE HCL 20 MG
20 TABLET ORAL ONCE
Status: COMPLETED | OUTPATIENT
Start: 2021-05-02 | End: 2021-05-02

## 2021-05-02 RX ADMIN — ENOXAPARIN SODIUM 40 MG: 40 INJECTION SUBCUTANEOUS at 08:50

## 2021-05-02 RX ADMIN — DICYCLOMINE HYDROCHLORIDE 20 MG: 20 TABLET ORAL at 06:29

## 2021-05-02 RX ADMIN — DICYCLOMINE HYDROCHLORIDE 20 MG: 20 TABLET ORAL at 23:05

## 2021-05-02 RX ADMIN — MELATONIN TAB 3 MG 3 MG: 3 TAB at 21:36

## 2021-05-02 RX ADMIN — DICYCLOMINE HYDROCHLORIDE 20 MG: 20 TABLET ORAL at 16:00

## 2021-05-02 RX ADMIN — NICOTINE 7 MG/24 HR DAILY TRANSDERMAL PATCH 1 PATCH: at 08:53

## 2021-05-02 RX ADMIN — ACETAMINOPHEN 975 MG: 325 TABLET ORAL at 08:50

## 2021-05-02 RX ADMIN — AMLODIPINE BESYLATE 5 MG: 5 TABLET ORAL at 08:51

## 2021-05-02 RX ADMIN — DICYCLOMINE HYDROCHLORIDE 20 MG: 20 TABLET ORAL at 11:50

## 2021-05-02 RX ADMIN — PANTOPRAZOLE SODIUM 40 MG: 40 TABLET, DELAYED RELEASE ORAL at 06:29

## 2021-05-02 NOTE — PROGRESS NOTES
INTERNAL MEDICINE RESIDENCY PROGRESS NOTE     Name: Trev Clement   Age & Sex: 63 y o  female   MRN: [id number]  Unit/Bed#: CW2 210-02   Encounter: [id number]  Team: SOD Team A    PATIENT INFORMATION     Name: Trev Clement   Age & Sex: 63 y o  female   MRN: [id number]  Hospital Stay Days: 0    ASSESSMENT/PLAN     Principal Problem:    Shortness of breath  Active Problems:    Abdominal pain    Hyperlipidemia    Pulmonary nodule    Hypertensive urgency    Positive D dimer    Abnormal finding on imaging of liver    Smoker    Hypertension      * Shortness of breath  Assessment & Plan  Patient presented with shortness of breath initially concerning for heart failure etiology given radiograph and cardiac serologies, however etiology appears unclear at this time  Shortness of breath seems secondary to bouts of abdominal pain  Additionally, she remains euvolemic on exam today without any signs or symptoms concerning for heart failure  Plan:  · Will continue holding off further diuresis  · TTE obtained yesterday, await official read, however as patient did have profoundly elevated NT proBNP with signs of LVH on presenting EKG, anticipate ejection fraction to be reduced and patient to require ischemic evaluation, either in the inpatient or outpatient setting    Hypertension  Assessment & Plan  See assessment and plan under hypertensive urgency above  Smoker  Assessment & Plan  Admitting lung imaging does show some mild emphysema  Plan  · Tobacco cessation materials on discharge  · Nicotine patch while admitted  · Tobacco cessation strongly encouraged as patient likely has underlying baseline COPD    Abnormal finding on imaging of liver  Assessment & Plan  Possible early cirrhosis noted on CT scan obtained on arrival   Chronic hepatitis panel did show high reactivity for hep C antibodies    Plan:  · Will recommend outpatient elastography  · Will check hep C viral RNA    Positive D dimer  Assessment & Plan  Patient with a positive D-dimer from her previous admission at [facility] 10 days ago at 2 17   CT PE protocol negative  Patient's D-dimer on admission 3 09  Doubt that her dyspnea is secondary to any new pulmonary embolism  Patient is not tachycardic, no ECG findings suggesting new PE  No history of DVT or PE  Plan:  · Will plan to treat new onset heart failure as above    Hypertensive urgency  Assessment & Plan  Patient with a blood pressure of 198/111 on arrival   Lab work indicating no signs of end-organ damage  Blood pressures are improving on antihypertensive  Plan:  · Labetalol 10 mg as needed every 6 hours for SBP greater than 180  · Continue amlodipine 5 mg daily  · Monitor pressures closely    Pulmonary nodule  Assessment & Plan  5 mm pulmonary nodule in the right middle lobe identified on arrival   Patient has a long standing history of smoking, would recommend outpatient follow-up for repeat imaging  Hyperlipidemia  Assessment & Plan  Patient has not seen a physician in years  Last lipid panel in 2015 revealed a total cholesterol 230, triglycerides 163, HDL 43,   Patient is not on any medication as an outpatient  Lipid panel obtained on arrival shows total cholesterol 199, triglycerides 103, HDL 44,   Plan:  · ASCVD risk of 10 8%, can consider statin therapy as an outpatient or inpatient pending further results    Abdominal pain  Assessment & Plan  Unclear etiology  Does have findings of atherosclerotic abdominal vessels on recent CT; intermittent abdominal pain although not clearly associated with food intake  Possible early cirrhosis on recent CT scan, however no significant ascites or abdominal distention  Tolerating diet well without nausea/vomiting/diarrhea/constipation  Abdominal exam remains benign and pain continues to be episodic/intermittent with no clear cause or temporality    Plan:  · Will continue Bentyl for now, monitor for improvement  · Initiate pantoprazole 40 mg daily p o  Bryan Ingles · Mesenteric duplex completed, results pending official read  · Given potential anginal equivalent, consider trialing sublingual nitroglycerin for further episodes of abdominal pain  · Suspect functional component, can trial TCA as well        Disposition:  Continue current level of care, pending official read of both echocardiogram and mesenteric duplex     SUBJECTIVE     Patient seen and examined at bedside this morning  Overnight, patient again reported bout of generalized, nonspecific abdominal pain, for which she received Bentyl and Senokot  Prior to our exam this morning, she had another episode of pain and was given Tylenol  Overnight, she was also given melatonin to assist with her sleep  This morning she says she currently feels well but continues to have this vague intermittent abdominal pain that is associated only with shortness of breath  Her review of systems is otherwise negative at this time  Case discussed with thalia BAILEY, all questions and concerns addressed at this time  OBJECTIVE     Vitals:    21 2310 21 0252 21 0452 21 0644   BP: 116/73 131/86  131/83   BP Location: Right arm Left arm  Right arm   Pulse: 92 (!) 109  78   Resp: 19 18  16   Temp: 100 2 °F (37 9 °C) 100 4 °F (38 °C)  98 7 °F (37 1 °C)   TempSrc: Oral Oral  Oral   SpO2: 93% (!) 89%  94%   Weight:   69 kg (152 lb 1 9 oz)    Height:          Temperature:   Temp (24hrs), Av 3 °F (37 4 °C), Min:98 1 °F (36 7 °C), Max:100 4 °F (38 °C)    Temperature: 98 7 °F (37 1 °C)  Intake & Output:  I/O       701 -  0700  07 -  0700  07 -  0700    P  O   400     Total Intake(mL/kg)  400 (5 8)     Net  +400            Unmeasured Urine Occurrence 1 x          Weights:   IBW (Ideal Body Weight): 54 7 kg    Body mass index is 26 11 kg/m²    Weight (last 2 days)     Date/Time   Weight    21 0452   69 (152 12) 04/30/21 20:51:09   68 8 (151 6)    04/30/21 1713   68 8 (151 6)    04/30/21 1325   56 7 (125)            Physical Exam  Vitals signs and nursing note reviewed  Constitutional:       General: She is not in acute distress  Appearance: Normal appearance  She is normal weight  She is not ill-appearing, toxic-appearing or diaphoretic  HENT:      Head: Normocephalic and atraumatic  Eyes:      General: No scleral icterus  Extraocular Movements: Extraocular movements intact  Conjunctiva/sclera: Conjunctivae normal       Pupils: Pupils are equal, round, and reactive to light  Neck:      Musculoskeletal: Neck supple  Comments: Jugular vein flat  Cardiovascular:      Rate and Rhythm: Normal rate and regular rhythm  Pulses: Normal pulses  Heart sounds: Normal heart sounds  No murmur  No friction rub  No gallop  Pulmonary:      Effort: Pulmonary effort is normal  No respiratory distress  Breath sounds: Normal breath sounds  No stridor  No wheezing or rhonchi  Abdominal:      General: Abdomen is flat  Bowel sounds are normal  There is no distension  Palpations: Abdomen is soft  There is no mass  Tenderness: There is no abdominal tenderness  There is no right CVA tenderness, left CVA tenderness, guarding or rebound  Hernia: No hernia is present  Musculoskeletal:         General: No swelling, tenderness, deformity or signs of injury  Right lower leg: No edema  Left lower leg: No edema  Lymphadenopathy:      Cervical: No cervical adenopathy  Skin:     General: Skin is warm and dry  Capillary Refill: Capillary refill takes less than 2 seconds  Coloration: Skin is not pale  Findings: No erythema or rash  Neurological:      General: No focal deficit present  Mental Status: She is alert  Psychiatric:         Mood and Affect: Mood normal          Behavior: Behavior normal          Thought Content:  Thought content normal  Judgment: Judgment normal        LABORATORY DATA     Labs: I have personally reviewed pertinent reports  Results from last 7 days   Lab Units 05/02/21  0451 05/01/21  0604 04/30/21  1408   WBC Thousand/uL 9 16 8 00 8 58   HEMOGLOBIN g/dL 12 8 12 3 12 4   HEMATOCRIT % 38 0 37 1 37 6   PLATELETS Thousands/uL 324 300 288   NEUTROS PCT % 62  --  77*   MONOS PCT % 15*  --  10      Results from last 7 days   Lab Units 05/02/21  0451 05/01/21  0604 04/30/21  1408   POTASSIUM mmol/L 3 5 3 8 3 4*   CHLORIDE mmol/L 104 108 111*   CO2 mmol/L 30 25 24   BUN mg/dL 24 21 15   CREATININE mg/dL 1 13 1 13 0 99   CALCIUM mg/dL 9 3 9 3 9 4   ALK PHOS U/L 117*  --  125*   ALT U/L 38  --  37   AST U/L 21  --  25     Results from last 7 days   Lab Units 05/02/21  0451 05/01/21  0604 04/30/21  1408   MAGNESIUM mg/dL 2 3 2 3 2 3     Results from last 7 days   Lab Units 05/01/21  0604   PHOSPHORUS mg/dL 3 7              Results from last 7 days   Lab Units 04/30/21  1408   TROPONIN I ng/mL 0 03       IMAGING & DIAGNOSTIC TESTING     Radiology Results: I have personally reviewed pertinent reports  Xr Chest 1 View Portable    Result Date: 4/30/2021  Impression: Cardiomegaly with vascular congestion likely on the basis of CHF  Workstation performed: [id number]     Other Diagnostic Testing: I have personally reviewed pertinent reports      ACTIVE MEDICATIONS     Current Facility-Administered Medications   Medication Dose Route Frequency    acetaminophen (TYLENOL) tablet 975 mg  975 mg Oral Q6H PRN    albuterol (PROVENTIL HFA,VENTOLIN HFA) inhaler 2 puff  2 puff Inhalation Q4H PRN    amLODIPine (NORVASC) tablet 5 mg  5 mg Oral Daily    calcium carbonate (TUMS) chewable tablet 1,000 mg  1,000 mg Oral Daily PRN    dicyclomine (BENTYL) tablet 20 mg  20 mg Oral TID AC    enoxaparin (LOVENOX) subcutaneous injection 40 mg  40 mg Subcutaneous Q24H WILDER    Labetalol HCl (NORMODYNE) injection 10 mg  10 mg Intravenous Q6H PRN    melatonin tablet 3 mg  3 mg Oral HS    nicotine (NICODERM CQ) 7 mg/24hr TD 24 hr patch 1 patch  1 patch Transdermal Daily    pantoprazole (PROTONIX) EC tablet 40 mg  40 mg Oral Early Morning    senna (SENOKOT) tablet 8 6 mg  1 tablet Oral HS       VTE Pharmacologic Prophylaxis: Enoxaparin (Lovenox)  VTE Mechanical Prophylaxis: sequential compression device    Portions of the record may have been created with voice recognition software  Occasional wrong word or "sound a like" substitutions may have occurred due to the inherent limitations of voice recognition software    Read the chart carefully and recognize, using context, where substitutions have occurred   ==  Jani Carreon, [address] [facility]  Internal Medicine Residency PGY-2

## 2021-05-02 NOTE — ASSESSMENT & PLAN NOTE
Admitting lung imaging does show some mild emphysema    Plan  · Tobacco cessation materials on discharge  · Nicotine patch while admitted  · Tobacco cessation strongly encouraged as patient likely has underlying baseline COPD

## 2021-05-02 NOTE — ASSESSMENT & PLAN NOTE
Possible early cirrhosis noted on CT scan obtained on arrival   Chronic hepatitis panel did show high reactivity for hep C antibodies    Plan:  · Will recommend outpatient elastography  · Will check hep C viral RNA

## 2021-05-02 NOTE — ASSESSMENT & PLAN NOTE
Unclear etiology  Does have findings of atherosclerotic abdominal vessels on recent CT; intermittent abdominal pain although not clearly associated with food intake  Possible early cirrhosis on recent CT scan, however no significant ascites or abdominal distention  Tolerating diet well without nausea/vomiting/diarrhea/constipation  Abdominal exam remains benign and pain continues to be episodic/intermittent with no clear cause or temporality  Plan:  · Will continue Bentyl for now, monitor for improvement  · Initiate pantoprazole 40 mg daily p o  Patti Jurist · Mesenteric duplex completed, results pending official read  · Given potential anginal equivalent, consider trialing sublingual nitroglycerin for further episodes of abdominal pain  · Suspect functional component, can trial TCA as well  -RUQ- No gallbladder findings  No intrahepatic biliary dilatation  CBD measures 6 mm  No choledocholithiasis   -Mild hepatomegaly  -  7 cm in the midclavicular line  Surface contour is smooth  Parenchyma:  Echogenicity and echotexture are within normal limits  No evidence of suspicious mass    Limited imaging of the main portal vein shows it to be patent and hepatopetal

## 2021-05-02 NOTE — ASSESSMENT & PLAN NOTE
5 mm pulmonary nodule in the right middle lobe identified on arrival   Patient has a long standing history of smoking, would recommend outpatient follow-up for repeat imaging

## 2021-05-02 NOTE — UTILIZATION REVIEW
Initial Clinical Review    OBS 4/30 @ 2712 UPGRADED TO INPATIENT 5/2 @ [facility] ABDOMINAL PAIN    Admission: Date/Time/Statement:   Admission Orders (From admission, onward)     Ordered        05/02/21 1008  Inpatient Admission  Once                Orders Placed This Encounter   Procedures    Inpatient Admission     Standing Status:   Standing     Number of Occurrences:   1     Order Specific Question:   Level of Care     Answer:   Med Surg [16]     Order Specific Question:   Estimated length of stay     Answer:   More than 2 Midnights     Order Specific Question:   Certification     Answer:   I certify that inpatient services are medically necessary for this patient for a duration of greater than two midnights  See H&P and MD Progress Notes for additional information about the patient's course of treatment  ED Arrival Information     Expected Arrival Acuity Means of Arrival Escorted By Service Admission Type    - 4/30/2021 13:19 Urgent Ambulance [facility]ulance General Medicine Urgent    Arrival Complaint    SOB        Chief Complaint   Patient presents with    Abdominal Pain     Abdominal pain radiating to the left flank, chest and back  Denies N/V/D  Going on for 2 weeks       Initial Presentation: 62 y/o female with PMHx of HLD, chronic pain, current smoker, no medical f/u in years, who presents to ED by EMS with c/o dspnea and abdominal pain radiating around to her back as well as chest tightness  PT seen in ED 10 days ago with same sxs, admitted for w/u but left AMA  The symptoms have been worsening over the past 10 days, especially her dyspnea on exertion   The patient endorses early satiety and some abdominal distention   On exam, (+) JVD, (+) hepatojugular reflux  B/L wheezing, B/L rales at bases, abd distention and tenderness in suprapubic, LUQ, LLQ  K 3 4, , Alk phos 125  /111    Admit observation to M/S/Tele unit with new onset heart failure and hypertensive urgency-- Telem monitoring,  IV lasix, strict I/O's  2 g sodium restriction, 1800 ml/day fluid restriction  TTE ordered  Bentyl and Tums prn  Lipase ordered  Labetalol 10 mg prn q 6hr for SBP >180      Date: 5/1  --  C/o chronic abdominal pain which has been ongoing for about 2 weeks associated with shortness of breath present even at rest  Tole diet  Continue bentyl for now  Pantoprazole po daily  Mesenteric duplex pending  BP remains elevated, add amlodipine  Hep panel ordered  D/c IV lasix  Echo pending  Date: 5/2 --  Pt continues with generalized nonspecific abd pain, received Bentyl and Senokot during the night, and Tylenol this morning  Suspect functional disorder  Mentions that she was on MS contin in the past, raising concerns for secondary gain  Awaiting TTE and mesenteric doppler read  Will need appropriate workup or management of hypertensive heart disease, and likely chronic lung disease, much of it as outpatient  Obviously may need inpatient workup if systolic dysfunction on TTE  Will avoid narcotics  Upgraded to inpatient        ED Triage Vitals   Temperature Pulse Respirations Blood Pressure SpO2   04/30/21 1332 04/30/21 1325 04/30/21 1325 04/30/21 1325 04/30/21 1325   98 4 °F (36 9 °C) 100 18 (!) 198/111 97 %      Temp Source Heart Rate Source Patient Position - Orthostatic VS BP Location FiO2 (%)   04/30/21 1332 04/30/21 1325 04/30/21 2051 04/30/21 1325 --   Oral Monitor Sitting Right arm       Pain Score       04/30/21 1416       Worst Possible Pain          Wt Readings from Last 1 Encounters:   05/02/21 69 kg (152 lb 1 9 oz)     Additional Vital Signs:   Date/Time  Temp  Pulse  Resp  BP  MAP (mmHg)  SpO2  O2 Device   05/02/21 06:44:58  98 7 °F (37 1 °C)  78  16  131/83  99  94 %  None (Room air)   05/02/21 02:52:24  100 4 °F (38 °C)  109Abnormal   18  131/86  101  89 %Abnormal   None (Room air)   05/01/21 23:10:29  100 2 °F (37 9 °C)  92  19  116/73  87  93 %  None (Room air) 05/01/21 18:50:06  --  88  --  142/95  111  95 %  --   05/01/21 1700  --  --  --  --  --  --  None (Room air)   05/01/21 15:43:17  98 9 °F (37 2 °C)  --  --  146/99  115  --  --   05/01/21 12:44:19  98 1 °F (36 7 °C)  --  20  156/100  119  --  --   05/01/21 09:44:35  --  --  --  155/98  117  --  --   05/01/21 07:23:20  98 9 °F (37 2 °C)  82  17  156/96  116  96 %  None (Room air)   05/01/21 03:29:13  --  84  --  155/96  116  95 %  --   05/01/21 00:06:49  98 7 °F (37 1 °C)  80  18  140/95  110  95 %  None (Room air)   04/30/21 20:51:09  99 2 °F (37 3 °C)  90  20  152/106Abnormal   121  95 %  None (Room air)   04/30/21 1816  --  86  18  162/109Abnormal   --  97 %  --   04/30/21 1815  --  88  --  --  --  98 %  None (Room air)   04/30/21 1611  --  80  18  164/100  125  94 %  --   04/30/21 1559  --  92  --  164/100  --  --  --   04/30/21 1332  98 4 °F (36 9 °C)  --  --  --  --  --  --   04/30/21 1325  --  100  18  198/111Abnormal   --  97 %  None (Room air)       Pertinent Labs/Diagnostic Test Results:   CXR 4/30 - Cardiomegaly with vascular congestion likely on the basis of CHF       Results from last 7 days   Lab Units 04/30/21  1416   SARS-COV-2  Negative     Results from last 7 days   Lab Units 05/02/21  0451 05/01/21  0604 04/30/21  1408   WBC Thousand/uL 9 16 8 00 8 58   HEMOGLOBIN g/dL 12 8 12 3 12 4   HEMATOCRIT % 38 0 37 1 37 6   PLATELETS Thousands/uL 324 300 288   NEUTROS ABS Thousands/µL 5 78  --  6 59     Results from last 7 days   Lab Units 05/02/21  0451 05/01/21  0604 04/30/21  1408   SODIUM mmol/L 138 140 141   POTASSIUM mmol/L 3 5 3 8 3 4*   CHLORIDE mmol/L 104 108 111*   CO2 mmol/L 30 25 24   ANION GAP mmol/L 4 7 6   BUN mg/dL 24 21 15   CREATININE mg/dL 1 13 1 13 0 99   EGFR ml/min/1 73sq m 53 53 63   CALCIUM mg/dL 9 3 9 3 9 4   MAGNESIUM mg/dL 2 3 2 3 2 3   PHOSPHORUS mg/dL  --  3 7  --      Results from last 7 days   Lab Units 05/02/21  0451 04/30/21  1408   AST U/L 21 25   ALT U/L 38 37   ALK PHOS U/L 117* 125*   TOTAL PROTEIN g/dL 8 1 8 1   ALBUMIN g/dL 3 0* 3 1*   TOTAL BILIRUBIN mg/dL 0 49 0 59         Results from last 7 days   Lab Units 05/02/21  0451 05/01/21  0604 04/30/21  1408   GLUCOSE RANDOM mg/dL 116 122 117         Results from last 7 days   Lab Units 04/30/21  1408   HEMOGLOBIN A1C % 5 4   EAG mg/dl 108     Results from last 7 days   Lab Units 04/30/21  1408   TROPONIN I ng/mL 0 03     Results from last 7 days   Lab Units 04/30/21  1416   D-DIMER QUANTITATIVE ug/ml FEU 3 09*         Results from last 7 days   Lab Units 04/30/21  1408   TSH 3RD GENERATON uIU/mL 3 610       Results from last 7 days   Lab Units 04/30/21  1408   NT-PRO BNP pg/mL 6,281*         Results from last 7 days   Lab Units 05/01/21  1247   HEP B S AG  Non-reactive   HEP C AB  High Reactive*   HEP B C IGM  Non-reactive   HEP B C TOTAL AB  Non-reactive     Results from last 7 days   Lab Units 04/30/21  1408   LIPASE u/L 93         ED Treatment:   Medication Administration from 04/30/2021 1319 to 04/30/2021 2047       Date/Time Order Dose Route Action     04/30/2021 1416 morphine (PF) 4 mg/mL injection 4 mg 4 mg Intravenous Given     04/30/2021 1805 acetaminophen (TYLENOL) tablet 650 mg 650 mg Oral Given     04/30/2021 1803 potassium chloride (K-DUR,KLOR-CON) CR tablet 40 mEq 40 mEq Oral Given     04/30/2021 1805 dicyclomine (BENTYL) tablet 20 mg 20 mg Oral Given     04/30/2021 1805 furosemide (LASIX) injection 40 mg 40 mg Intravenous Given     Past Medical History:   Diagnosis Date    Hyperlipidemia     RSD (reflex sympathetic dystrophy)      Present on Admission:   Hyperlipidemia   Shortness of breath   Hypertensive urgency   Abdominal pain   Pulmonary nodule      Admitting Diagnosis: Back pain [M54 9]  Chest pain [R07 9]  Dyspnea [R06 00]  SOB (shortness of breath) [R06 02]  Hypertension [I10]  Elevated brain natriuretic peptide (BNP) level [R79 89]  Elevated d-dimer [R79 89]  Age/Sex: 63 y o  female  Admission Orders:  Scheduled Medications:  amLODIPine, 5 mg, Oral, Daily  dicyclomine, 20 mg, Oral, TID AC  enoxaparin, 40 mg, Subcutaneous, Q24H WILDER  melatonin, 3 mg, Oral, HS  nicotine, 1 patch, Transdermal, Daily  pantoprazole, 40 mg, Oral, Early Morning  senna, 1 tablet, Oral, HS         PRN Meds:  acetaminophen, 975 mg, Oral, Q6H PRN 5/2 X1  albuterol, 2 puff, Inhalation, Q4H PRN  calcium carbonate, 1,000 mg, Oral, Daily PRN  Labetalol HCl, 10 mg, Intravenous, Q6H PRN        Network Utilization Review Department  ATTENTION: Please call with any questions or concerns to [phone] and carefully listen to the prompts so that you are directed to the right person  All voicemails are confidential   [facility] all requests for admission clinical reviews, approved or denied determinations and any other requests to dedicated fax number below belonging to the campus where the patient is receiving treatment   List of dedicated fax numbers for the Facilities:  [address] DENIALS (Administrative/Medical Necessity) [phone]   [address] (Maternity/NICU/Pediatrics) [phone]   [address] Edwar Doss 8445 [address] [place] [address] [phone] [facility] [phone]

## 2021-05-03 ENCOUNTER — APPOINTMENT (INPATIENT)
Dept: RADIOLOGY | Facility: HOSPITAL | Age: 61
DRG: 469 | End: 2021-05-03
Payer: COMMERCIAL

## 2021-05-03 PROBLEM — I50.40 COMBINED SYSTOLIC AND DIASTOLIC CONGESTIVE HEART FAILURE (HCC): Status: ACTIVE | Noted: 2021-05-03

## 2021-05-03 LAB
ANION GAP SERPL CALCULATED.3IONS-SCNC: 6 MMOL/L (ref 4–13)
BASOPHILS # BLD AUTO: 0.06 THOUSANDS/ΜL (ref 0–0.1)
BASOPHILS NFR BLD AUTO: 1 % (ref 0–1)
BUN SERPL-MCNC: 18 MG/DL (ref 5–25)
CALCIUM SERPL-MCNC: 9.2 MG/DL (ref 8.3–10.1)
CHLORIDE SERPL-SCNC: 108 MMOL/L (ref 100–108)
CO2 SERPL-SCNC: 25 MMOL/L (ref 21–32)
CREAT SERPL-MCNC: 0.96 MG/DL (ref 0.6–1.3)
EOSINOPHIL # BLD AUTO: 0.22 THOUSAND/ΜL (ref 0–0.61)
EOSINOPHIL NFR BLD AUTO: 2 % (ref 0–6)
ERYTHROCYTE [DISTWIDTH] IN BLOOD BY AUTOMATED COUNT: 12.8 % (ref 11.6–15.1)
GFR SERPL CREATININE-BSD FRML MDRD: 65 ML/MIN/1.73SQ M
GLUCOSE SERPL-MCNC: 122 MG/DL (ref 65–140)
HCT VFR BLD AUTO: 37.2 % (ref 34.8–46.1)
HGB BLD-MCNC: 12.3 G/DL (ref 11.5–15.4)
IMM GRANULOCYTES # BLD AUTO: 0.05 THOUSAND/UL (ref 0–0.2)
IMM GRANULOCYTES NFR BLD AUTO: 1 % (ref 0–2)
LYMPHOCYTES # BLD AUTO: 1.59 THOUSANDS/ΜL (ref 0.6–4.47)
LYMPHOCYTES NFR BLD AUTO: 16 % (ref 14–44)
MCH RBC QN AUTO: 30.3 PG (ref 26.8–34.3)
MCHC RBC AUTO-ENTMCNC: 33.1 G/DL (ref 31.4–37.4)
MCV RBC AUTO: 92 FL (ref 82–98)
MONOCYTES # BLD AUTO: 1.29 THOUSAND/ΜL (ref 0.17–1.22)
MONOCYTES NFR BLD AUTO: 13 % (ref 4–12)
NEUTROPHILS # BLD AUTO: 6.68 THOUSANDS/ΜL (ref 1.85–7.62)
NEUTS SEG NFR BLD AUTO: 67 % (ref 43–75)
NRBC BLD AUTO-RTO: 0 /100 WBCS
PLATELET # BLD AUTO: 320 THOUSANDS/UL (ref 149–390)
PMV BLD AUTO: 10.7 FL (ref 8.9–12.7)
POTASSIUM SERPL-SCNC: 3.6 MMOL/L (ref 3.5–5.3)
RBC # BLD AUTO: 4.06 MILLION/UL (ref 3.81–5.12)
SODIUM SERPL-SCNC: 139 MMOL/L (ref 136–145)
WBC # BLD AUTO: 9.89 THOUSAND/UL (ref 4.31–10.16)

## 2021-05-03 PROCEDURE — 80048 BASIC METABOLIC PNL TOTAL CA: CPT | Performed by: STUDENT IN AN ORGANIZED HEALTH CARE EDUCATION/TRAINING PROGRAM

## 2021-05-03 PROCEDURE — 93005 ELECTROCARDIOGRAM TRACING: CPT

## 2021-05-03 PROCEDURE — 99232 SBSQ HOSP IP/OBS MODERATE 35: CPT | Performed by: INTERNAL MEDICINE

## 2021-05-03 PROCEDURE — 99223 1ST HOSP IP/OBS HIGH 75: CPT | Performed by: INTERNAL MEDICINE

## 2021-05-03 PROCEDURE — 76705 ECHO EXAM OF ABDOMEN: CPT

## 2021-05-03 PROCEDURE — 85025 COMPLETE CBC W/AUTO DIFF WBC: CPT | Performed by: STUDENT IN AN ORGANIZED HEALTH CARE EDUCATION/TRAINING PROGRAM

## 2021-05-03 RX ORDER — ASPIRIN 81 MG/1
324 TABLET, CHEWABLE ORAL ONCE
Status: DISCONTINUED | OUTPATIENT
Start: 2021-05-03 | End: 2021-05-04

## 2021-05-03 RX ORDER — FUROSEMIDE 10 MG/ML
20 INJECTION INTRAMUSCULAR; INTRAVENOUS ONCE
Status: COMPLETED | OUTPATIENT
Start: 2021-05-03 | End: 2021-05-03

## 2021-05-03 RX ORDER — KETOROLAC TROMETHAMINE 30 MG/ML
15 INJECTION, SOLUTION INTRAMUSCULAR; INTRAVENOUS ONCE
Status: COMPLETED | OUTPATIENT
Start: 2021-05-03 | End: 2021-05-03

## 2021-05-03 RX ORDER — LISINOPRIL 2.5 MG/1
5 TABLET ORAL DAILY
Status: DISCONTINUED | OUTPATIENT
Start: 2021-05-03 | End: 2021-05-04

## 2021-05-03 RX ORDER — ATORVASTATIN CALCIUM 40 MG/1
40 TABLET, FILM COATED ORAL
Status: DISCONTINUED | OUTPATIENT
Start: 2021-05-03 | End: 2021-05-07 | Stop reason: HOSPADM

## 2021-05-03 RX ORDER — POTASSIUM CHLORIDE 20 MEQ/1
40 TABLET, EXTENDED RELEASE ORAL ONCE
Status: COMPLETED | OUTPATIENT
Start: 2021-05-03 | End: 2021-05-03

## 2021-05-03 RX ORDER — SIMETHICONE 80 MG
80 TABLET,CHEWABLE ORAL EVERY 6 HOURS PRN
Status: DISCONTINUED | OUTPATIENT
Start: 2021-05-03 | End: 2021-05-07 | Stop reason: HOSPADM

## 2021-05-03 RX ADMIN — PANTOPRAZOLE SODIUM 40 MG: 40 TABLET, DELAYED RELEASE ORAL at 05:22

## 2021-05-03 RX ADMIN — DICYCLOMINE HYDROCHLORIDE 20 MG: 20 TABLET ORAL at 18:00

## 2021-05-03 RX ADMIN — MELATONIN TAB 3 MG 3 MG: 3 TAB at 21:43

## 2021-05-03 RX ADMIN — KETOROLAC TROMETHAMINE 15 MG: 30 INJECTION, SOLUTION INTRAMUSCULAR at 18:00

## 2021-05-03 RX ADMIN — AMLODIPINE BESYLATE 5 MG: 5 TABLET ORAL at 08:30

## 2021-05-03 RX ADMIN — SIMETHICONE 80 MG: 80 TABLET, CHEWABLE ORAL at 02:05

## 2021-05-03 RX ADMIN — METOPROLOL TARTRATE 25 MG: 25 TABLET, FILM COATED ORAL at 15:26

## 2021-05-03 RX ADMIN — POTASSIUM CHLORIDE 40 MEQ: 1500 TABLET, EXTENDED RELEASE ORAL at 11:35

## 2021-05-03 RX ADMIN — LISINOPRIL 5 MG: 2.5 TABLET ORAL at 11:35

## 2021-05-03 RX ADMIN — NICOTINE 7 MG/24 HR DAILY TRANSDERMAL PATCH 1 PATCH: at 08:31

## 2021-05-03 RX ADMIN — FUROSEMIDE 20 MG: 10 INJECTION, SOLUTION INTRAMUSCULAR; INTRAVENOUS at 11:36

## 2021-05-03 RX ADMIN — DICYCLOMINE HYDROCHLORIDE 20 MG: 20 TABLET ORAL at 11:36

## 2021-05-03 RX ADMIN — ACETAMINOPHEN 975 MG: 325 TABLET ORAL at 18:00

## 2021-05-03 RX ADMIN — ATORVASTATIN CALCIUM 40 MG: 40 TABLET, FILM COATED ORAL at 18:00

## 2021-05-03 RX ADMIN — DICYCLOMINE HYDROCHLORIDE 20 MG: 20 TABLET ORAL at 06:30

## 2021-05-03 NOTE — ASSESSMENT & PLAN NOTE
Wt Readings from Last 3 Encounters:   05/03/21 69 kg (152 lb 1 9 oz)   04/20/21 56 7 kg (125 lb)   03/23/18 56 7 kg (125 lb)     5/3- Echo EF 84% Grade II diastolic Dysfunction  -Diffuse hypokinesis LA dilated, Mild MR & TR  -presented w/ volume overload had a good response w/ lasix 40mg IV  -Started on Lisinopril 5mg and Lipitor 40mg  -will hold onto B-Blocker / Aldactone at this time  -Can consider Bisoprolol if respiratory distress with other B-blocker as it is more cardioselective  -will give another Lasix 20mg IV  -Monitor I/O   -Daily weight checks  -fluctuating Cr from   9 to 1 4 to 1 24  -Nephro consult per Cards   -Cath tomorrow likely   -Hold lisinopril  -Gentle hydration Pre / Post to prevent Contrast Nephropathy  -Cath triple vessel disease non-ischemic cardiomyopathy- Recommend Medical Management  -Life Vest-refused risk vs benefit discussed, understands and acknowledges   -MRI- non-ischemic cardiomyopathy   -D/C today- follow up in clinic

## 2021-05-03 NOTE — CONSULTS
Consultation - Advanced Heart Failure Team  Jazmine Gibbons 63 y o  female MRN: [id number]  Unit/Bed#: CW2 210-02 Encounter: [id number]            Inpatient consult to Cardiology     Performed by  Tiffanie Galo MD     Authorized by Vincent Medina MD            PCP: No primary care provider on file  Outpatient Cardiologist: None on file      History of Present Illness   Physician Requesting Consult: Mau Rajan MD  Reason for Consult / Principal Problem: new onset CHF    HPI: Jazmine Gibbons is a 63y o  year old female with a history of uncontrolled hypertension, self reported history of RSD with chronic pain syndrome , tobacco abuse who presented for three week history of RLQ abdominal pain and shortness of breath on exertion  Pain reported to be constant and radiates to the back  No medications taken for relief  Not related with food intake  No nausea/vomiting  No fever/chills  Reports SOB mostly when walking short distances which was not present before  Denies orthopnea/leg swelling/weight changes  Patient initially presented at [facility] on 4/20 and work up was done which revealed elevated trop of 0 06, BNP 4,138, elevated d-dimer of 2 17 with negative CT PE protocol study  No abdominal pathology was also identified in the CT scan  She was admitted however decided to leave AMA  She presented to [place] [id number] for similar complaints and based on records left without being seen  4 days ago she presented at [facility] for worsening abdominal pain  BNP elevated at 6,281  Troponin WNL  CXR showed cardiomegaly with vascular congestion  BP on initial presentation was 198/111  EKG showed LVH  Echocardiogram was done which showed EF 35% wit severe diffuse hypokinesis  Lisinopril 5mg was initiated and she received 2 doses of IV Lasix 40mg and today got 1 dose of IV Lasix 20mg  We are being consulted for further evaluation and management of newly diagnosed CHF      Patient has significant smoking history, started smoking around age 18yo and consumes 1ppd  Denies alcohol use, denies illicit drug use  No significant cardiac history in the family  Review of Systems   Constitutional: Negative for appetite change, chills, fever and unexpected weight change  HENT: Negative for congestion, tinnitus and trouble swallowing  Eyes: Negative for pain and visual disturbance  Respiratory: Positive for shortness of breath  Cardiovascular: Negative for chest pain, palpitations and leg swelling  Gastrointestinal: Positive for abdominal pain  Negative for nausea and vomiting  Genitourinary: Negative for dysuria and hematuria  Musculoskeletal: Positive for arthralgias and back pain  Skin: Negative for pallor and rash  Neurological: Negative for dizziness, light-headedness and headaches  Hematological: Negative for adenopathy  Psychiatric/Behavioral: Negative for confusion and hallucinations  All other systems reviewed and are negative  ROS as noted above  Historical Information   Past Medical History:   Diagnosis Date    Hyperlipidemia     RSD (reflex sympathetic dystrophy)      Past Surgical History:   Procedure Laterality Date    BREAST SURGERY       Social History     Substance and Sexual Activity   Alcohol Use No     Social History     Substance and Sexual Activity   Drug Use No     Social History     Tobacco Use   Smoking Status Current Every Day Smoker    Packs/day: 1 00    Types: Cigarettes   Smokeless Tobacco Never Used     Family History: History reviewed  No pertinent family history      Meds/Allergies   Hospital Medications:   Current Facility-Administered Medications   Medication Dose Route Frequency    acetaminophen (TYLENOL) tablet 975 mg  975 mg Oral Q6H PRN    albuterol (PROVENTIL HFA,VENTOLIN HFA) inhaler 2 puff  2 puff Inhalation Q4H PRN    atorvastatin (LIPITOR) tablet 40 mg  40 mg Oral After Dinner    calcium carbonate (TUMS) chewable tablet 1,000 mg  1,000 mg Oral Daily PRN    dicyclomine (BENTYL) tablet 20 mg  20 mg Oral TID AC    enoxaparin (LOVENOX) subcutaneous injection 40 mg  40 mg Subcutaneous Q24H WILDER    Labetalol HCl (NORMODYNE) injection 10 mg  10 mg Intravenous Q6H PRN    lisinopril (ZESTRIL) tablet 5 mg  5 mg Oral Daily    melatonin tablet 3 mg  3 mg Oral HS    nicotine (NICODERM CQ) 7 mg/24hr TD 24 hr patch 1 patch  1 patch Transdermal Daily    pantoprazole (PROTONIX) EC tablet 40 mg  40 mg Oral Early Morning    senna (SENOKOT) tablet 8 6 mg  1 tablet Oral HS    simethicone (MYLICON) chewable tablet 80 mg  80 mg Oral Q6H PRN     Home Medications:   No medications prior to admission  No Known Allergies    Objective   Vitals: Blood pressure 119/96, pulse 83, temperature 98 6 °F (37 °C), resp  rate 18, height 5' 4" (1 626 m), weight 69 kg (152 lb 1 9 oz), SpO2 98 %  Orthostatic Blood Pressures      Most Recent Value   Blood Pressure  119/96 filed at 05/03/2021 0725   Patient Position - Orthostatic VS  Lying filed at 05/02/2021 4954            Invasive Devices     Peripheral Intravenous Line            Peripheral IV 05/01/21 Right Forearm 2 days                Physical Exam  Vitals signs and nursing note reviewed  Constitutional:       General: She is not in acute distress  Appearance: She is not ill-appearing or toxic-appearing  HENT:      Head: Normocephalic and atraumatic  Nose: Nose normal       Mouth/Throat:      Mouth: Mucous membranes are moist       Pharynx: Oropharynx is clear  Eyes:      Conjunctiva/sclera: Conjunctivae normal       Pupils: Pupils are equal, round, and reactive to light  Neck:      Musculoskeletal: Normal range of motion  No neck rigidity  Cardiovascular:      Rate and Rhythm: Normal rate and regular rhythm  Pulses: Normal pulses     Pulmonary:      Effort: Pulmonary effort is normal       Comments: Minimal course breath sounds  Abdominal:      General: Bowel sounds are normal       Tenderness: There is abdominal tenderness (RLQ mostly)  There is no guarding or rebound  Musculoskeletal: Normal range of motion  General: No swelling  Skin:     General: Skin is warm and dry  Neurological:      General: No focal deficit present  Mental Status: She is alert and oriented to person, place, and time  Mental status is at baseline  Psychiatric:         Mood and Affect: Mood normal          Thought Content:  Thought content normal          Lab Results:   CBC with diff:   Results from last 7 days   Lab Units 05/03/21  0645   WBC Thousand/uL 9 89   RBC Million/uL 4 06   HEMOGLOBIN g/dL 12 3   HEMATOCRIT % 37 2   MCV fL 92   MCH pg 30 3   MCHC g/dL 33 1   RDW % 12 8   MPV fL 10 7   PLATELETS Thousands/uL 320     CMP:   Results from last 7 days   Lab Units 05/03/21  0520 05/02/21  0451   SODIUM mmol/L 139 138   POTASSIUM mmol/L 3 6 3 5   CHLORIDE mmol/L 108 104   CO2 mmol/L 25 30   BUN mg/dL 18 24   CREATININE mg/dL 0 96 1 13   CALCIUM mg/dL 9 2 9 3   AST U/L  --  21   ALT U/L  --  38   ALK PHOS U/L  --  117*   EGFR ml/min/1 73sq m 65 53     Troponin:   0   Lab Value Date/Time    TROPONINI 0 03 04/30/2021 1408    TROPONINI 0 06 (H) 04/20/2021 1956    TROPONINI 0 05 (H) 04/20/2021 1712     BNP:   Results from last 7 days   Lab Units 05/03/21  0520   POTASSIUM mmol/L 3 6   CHLORIDE mmol/L 108   CO2 mmol/L 25   BUN mg/dL 18   CREATININE mg/dL 0 96   CALCIUM mg/dL 9 2   EGFR ml/min/1 73sq m 65     Coags:     TSH:   Results from last 7 days   Lab Units 04/30/21  1408   TSH 3RD GENERATON uIU/mL 3 610     Magnesium:   Results from last 7 days   Lab Units 05/02/21  0451   MAGNESIUM mg/dL 2 3     Lipid Profile:   Results from last 7 days   Lab Units 04/30/21  1408   HDL mg/dL 44   LDL CALC mg/dL 134*   TRIGLYCERIDES mg/dL 103     Results from last 7 days   Lab Units 04/30/21  1408   TROPONIN I ng/mL 0 03   NT-PRO BNP pg/mL 6,281*     Results from last 7 days   Lab Units 05/03/21  0520 05/02/21  0451 21  0604 21  1408   POTASSIUM mmol/L 3 6 3 5 3 8 3 4*   CO2 mmol/L 25 30 25 24   CHLORIDE mmol/L 108 104 108 111*   BUN mg/dL 18 24 21 15   CREATININE mg/dL 0 96 1 13 1 13 0 99   ALT U/L  --  38  --  37   AST U/L  --  21  --  25     Results from last 7 days   Lab Units 21  0645   WBC Thousand/uL 9 89   HEMOGLOBIN g/dL 12 3   HEMATOCRIT % 37 2   MCV fL 92   PLATELETS Thousands/uL 320         Results from last 7 days   Lab Units 21  1408   HDL mg/dL 44   LDL CALC mg/dL 134*   TRIGLYCERIDES mg/dL 103         Imaging: I have personally reviewed pertinent reports  and I have personally reviewed pertinent films in PACS    Holter/Telemetry:     ECHO:   Results for orders placed during the hospital encounter of 21   Echo complete with contrast if indicated    Narrative [facility] [address]  [facility], [address] [facility]  [phone]    Transthoracic Echocardiogram  2D, M-mode, Doppler, and Color Doppler    Study date:  01-May-2021    Patient: Colette Thompson  MR number: [id number]  Account number: [de-identified]  : 1960  Age: 63 years  Gender: Female  Status: Inpatient  Location: Bedside  Height: 64 in  Weight: 150 7 lb  BP: 156/ 100 mmHg    Indications: Assess left ventricular function  Diagnoses: I50 9 - Heart failure, unspecified    Sonographer:  ABRAHAM Torrez  Referring Physician:  Mary Escalante DO  Group:  [facility]  Interpreting Physician:  Susie Vargas MD    SUMMARY    PROCEDURE INFORMATION:  This was a technically difficult study  LEFT VENTRICLE:  The ventricle was mildly dilated  Systolic function was severely reduced  Ejection fraction was estimated to be 32 %  There was severe diffuse hypokinesis  Wall thickness was mildly to moderately increased  The changes were consistent with eccentric hypertrophy    Features were consistent with a pseudonormal left ventricular filling pattern, with concomitant abnormal relaxation and increased filling pressure (grade 2 diastolic dysfunction)  LEFT ATRIUM:  The atrium was mildly dilated  MITRAL VALVE:  There was mild regurgitation  TRICUSPID VALVE:  There was mild regurgitation  PULMONIC VALVE:  There was trace regurgitation  HISTORY: PRIOR HISTORY: Chest pain, Hypertension, Heart failure, Hyperlipidemia, Tobacco use    PROCEDURE: The procedure was performed at the bedside  This was a routine study  The transthoracic approach was used  The study included complete 2D imaging, M-mode, complete spectral Doppler, and color Doppler  The heart rate was 87 bpm,  at the start of the study  Images were obtained from the parasternal, apical, subcostal, and suprasternal notch acoustic windows  Intravenous contrast (Definity solution [1 3 ml Definity/8 7ml normal saline solution]) was administered to  opacify the left ventricle  This was a technically difficult study  LEFT VENTRICLE: The ventricle was mildly dilated  Systolic function was severely reduced  Ejection fraction was estimated to be 32 %  There was severe diffuse hypokinesis  Wall thickness was mildly to moderately increased  The changes were  consistent with eccentric hypertrophy  DOPPLER: Features were consistent with a pseudonormal left ventricular filling pattern, with concomitant abnormal relaxation and increased filling pressure (grade 2 diastolic dysfunction)  RIGHT VENTRICLE: The size was normal  Systolic function was normal  Wall thickness was normal     LEFT ATRIUM: The atrium was mildly dilated  RIGHT ATRIUM: Size was normal     MITRAL VALVE: Valve structure was normal  There was normal leaflet separation  DOPPLER: The transmitral velocity was within the normal range  There was no evidence for stenosis  There was mild regurgitation  AORTIC VALVE: The valve was trileaflet  Leaflets exhibited normal thickness and normal cuspal separation   DOPPLER: Transaortic velocity was within the normal range  There was no evidence for stenosis  There was no regurgitation  TRICUSPID VALVE: The valve structure was normal  There was normal leaflet separation  DOPPLER: The transtricuspid velocity was within the normal range  There was no evidence for stenosis  There was mild regurgitation  PULMONIC VALVE: Leaflets exhibited normal thickness, no calcification, and normal cuspal separation  DOPPLER: The transpulmonic velocity was within the normal range  There was trace regurgitation  PERICARDIUM: There was no pericardial effusion  The pericardium was normal in appearance  AORTA: The root exhibited normal size      SYSTEM MEASUREMENT TABLES    2D  %FS: 18 18 %  Ao Diam: 2 96 cm  EDV(Teich): 183 41 ml  EF Biplane: 28 24 %  EF(Teich): 37 03 %  ESV(Teich): 115 5 ml  IVSd: 1 26 cm  LA Area: 19 02 cm2  LA Diam: 3 82 cm  LVEDV MOD A2C: 183 85 ml  LVEDV MOD A4C: 151 39 ml  LVEDV MOD BP: 171 94 ml  LVEF MOD A2C: 26 22 %  LVEF MOD A4C: 32 08 %  LVESV MOD A2C: 135 64 ml  LVESV MOD A4C: 102 82 ml  LVESV MOD BP: 123 39 ml  LVIDd: 6 05 cm  LVIDs: 4 95 cm  LVLd A2C: 9 13 cm  LVLd A4C: 8 59 cm  LVLs A2C: 8 59 cm  LVLs A4C: 7 7 cm  LVPWd: 1 32 cm  RA Area: 13 73 cm2  RVIDd: 3 27 cm  SV MOD A2C: 48 21 ml  SV MOD A4C: 48 57 ml  SV(Teich): 67 91 ml    MM  TAPSE: 1 99 cm    PW  E' Sept: 0 05 m/s  E/E' Sept: 16 51  MV A [place]: 0 38 m/s  MV Dec [place]: 5 18 m/s2  MV DecT: 147 96 ms  MV E Roger: 0 77 m/s  MV E/A Ratio: 2 02  MV PHT: 42 91 ms  MVA By PHT: 5 13 cm2    [facility] Accredited [facility]chocardi[facility]    Prepared and electronically signed by    Mahi Wills MD  Signed 03-May-2021 12:35:29         CATH/STRESS TEST:   None on file    EKG:   Date: 4/30/2021  Interpretation: NSR, LVH with repolarization abnormality      VTE Prophylaxis: Enoxaparin (Lovenox)      Assessment/Plan     Assessment:    #Newly diagnosed HFrEF 32%  - elevated BNP 6, 281  - echocardiogram: EF 32% with severe diffuse hypokinesis, wall thickness mildly to moderately increased consistent with eccentric hypertrophy  G2DD  - EKG showed LVH  - consider ICM vs NICM, patient reports longstanding history of uncontrolled HTN and significant tobacco use  Possible alcohol/toxin although reports no alcohol/illicit drug use  #Longstanding uncontrolled HTN  - presented with SBP >190s, started on Lisinopril 5mg daily   - BP range 130-140s currently  #Tobacco abuse  - has 43 py smoking history  #RLQ abdominal pain  - awaiting US RUQ  - LFTs and t bili within range  #Positive for Hep C antibody  - denies illicit drug use  - on CT scan with noted changes suspicious for early cirrhosis  #Self reported history of RDS with chronic pain syndrome      Plan:  1  Will schedule for ischemic work up cardiac catheterization  2  Will evaluate for LifeVest prior to discharge  3  Will consider cardiac MRI if cardiac cath negative  4  Continue with Lisinopril 5mg daily, consider starting on metoprolol tartrate 25mg BID  5  Encouraged tobacco cessation  6  Follow up 2022 [place] , CT scan abdomen with noted changes suspicious for early cirrhosis  Pain control per primary team      Case discussed and reviewed with Dr Robles Cook  Further recommendations to follow  Thank you for involving us in the care of your patient  Brian Gordon MD  Resident PGY-2      ==========================================================================================    Counseling / Coordination of Care  Total floor / unit time spent today 45 minutes minutes  Greater than 50% of total time was spent with the patient and / or family counseling and / or coordination of care  A description of the counseling / coordination of care:       Epic/ Allscripts/Care Everywhere records reviewed: Yes    ** Please Note: Fluency DirectDictation voice to text software may have been used in the creation of this document   **

## 2021-05-03 NOTE — PLAN OF CARE
Problem: PAIN - ADULT  Goal: Verbalizes/displays adequate comfort level or baseline comfort level  Description: Interventions:  - Encourage patient to monitor pain and request assistance  - Assess pain using appropriate pain scale  - Administer analgesics based on type and severity of pain and evaluate response  - Implement non-pharmacological measures as appropriate and evaluate response  - Consider cultural and social influences on pain and pain management  - Notify physician/advanced practitioner if interventions unsuccessful or patient reports new pain  Outcome: Progressing     Problem: INFECTION - ADULT  Goal: Absence or prevention of progression during hospitalization  Description: INTERVENTIONS:  - Assess and monitor for signs and symptoms of infection  - Monitor lab/diagnostic results  - Monitor all insertion sites, i e  indwelling lines, tubes, and drains  - Monitor endotracheal if appropriate and nasal secretions for changes in amount and color  - [place] appropriate cooling/warming therapies per order  - Administer medications as ordered  - Instruct and encourage patient and family to use good hand hygiene technique  - Identify and instruct in appropriate isolation precautions for identified infection/condition  Outcome: Progressing  Goal: Absence of fever/infection during neutropenic period  Description: INTERVENTIONS:  - Monitor WBC    Outcome: Progressing     Problem: SAFETY ADULT  Goal: Patient will remain free of falls  Description: INTERVENTIONS:  - Assess patient frequently for physical needs  -  Identify cognitive and physical deficits and behaviors that affect risk of falls    -  [place] fall precautions as indicated by assessment   - Educate patient/family on patient safety including physical limitations  - Instruct patient to call for assistance with activity based on assessment  - Modify environment to reduce risk of injury  - Consider OT/PT consult to assist with strengthening/mobility  Outcome: Progressing  Goal: Maintain or return to baseline ADL function  Description: INTERVENTIONS:  -  Assess patient's ability to carry out ADLs; assess patient's baseline for ADL function and identify physical deficits which impact ability to perform ADLs (bathing, care of mouth/teeth, toileting, grooming, dressing, etc )  - Assess/evaluate cause of self-care deficits   - Assess range of motion  - Assess patient's mobility; develop plan if impaired  - Assess patient's need for assistive devices and provide as appropriate  - Encourage maximum independence but intervene and supervise when necessary  - Involve family in performance of ADLs  - Assess for home care needs following discharge   - Consider OT consult to assist with ADL evaluation and planning for discharge  - Provide patient education as appropriate  Outcome: Progressing  Goal: Maintain or return mobility status to optimal level  Description: INTERVENTIONS:  - Assess patient's baseline mobility status (ambulation, transfers, stairs, etc )    - Identify cognitive and physical deficits and behaviors that affect mobility  - Identify mobility aids required to assist with transfers and/or ambulation (gait belt, sit-to-stand, lift, walker, cane, etc )  - [place] fall precautions as indicated by assessment  - Record patient progress and toleration of activity level on Mobility SBAR; progress patient to next Phase/Stage  - Instruct patient to call for assistance with activity based on assessment  - Consider rehabilitation consult to assist with strengthening/weightbearing, etc   Outcome: Progressing     Problem: DISCHARGE PLANNING  Goal: Discharge to home or other facility with appropriate resources  Description: INTERVENTIONS:  - Identify barriers to discharge w/patient and caregiver  - Arrange for needed discharge resources and transportation as appropriate  - Identify discharge learning needs (meds, wound care, etc )  - Arrange for interpretive services to assist at discharge as needed  - Refer to Case Management Department for coordinating discharge planning if the patient needs post-hospital services based on physician/advanced practitioner order or complex needs related to functional status, cognitive ability, or social support system  Outcome: Progressing     Problem: Knowledge Deficit  Goal: Patient/family/caregiver demonstrates understanding of disease process, treatment plan, medications, and discharge instructions  Description: Complete learning assessment and assess knowledge base    Interventions:  - Provide teaching at level of understanding  - Provide teaching via preferred learning methods  Outcome: Progressing

## 2021-05-03 NOTE — PROGRESS NOTES
INTERNAL MEDICINE RESIDENCY PROGRESS NOTE     Name: Paulette Aldana   Age & Sex: 63 y o  female   MRN: [id number]  Unit/Bed#: CW2 210-02   Encounter: [id number]  Team: SOD Team A    PATIENT INFORMATION     Name: Paulette Aldana   Age & Sex: 63 y o  female   MRN: [id number]  Hospital Stay Days: 1    ASSESSMENT/PLAN     Principal Problem:    Combined systolic and diastolic congestive heart failure (HCC)  Active Problems:    Abdominal pain    Hypertension    Smoker    Hyperlipidemia    Pulmonary nodule    Shortness of breath    Hypertensive urgency    Positive D dimer    Abnormal finding on imaging of liver      * Combined systolic and diastolic congestive heart failure (HCC)  Assessment & Plan  Wt Readings from Last 3 Encounters:   05/03/21 69 kg (152 lb 1 9 oz)   04/20/21 56 7 kg (125 lb)   03/23/18 56 7 kg (125 lb)     5/3- Echo EF 46% Grade II diastolic Dysfunction  -Diffuse hypokinesis LA dilated, Mild MR & TR  -presented w/ volume overload had a good response w/ lasix 40mg IV  -Started on Lisinopril 5mg and Lipitor 40mg  -will hold onto B-Blocker / Aldactone at this time  -will give another Lasix 20mg IV  -Monitor I/O   -Daily weight checks  -Cards consult pending       Abdominal pain  Assessment & Plan  Unclear etiology  Does have findings of atherosclerotic abdominal vessels on recent CT; intermittent abdominal pain although not clearly associated with food intake  Possible early cirrhosis on recent CT scan, however no significant ascites or abdominal distention  Tolerating diet well without nausea/vomiting/diarrhea/constipation  Abdominal exam remains benign and pain continues to be episodic/intermittent with no clear cause or temporality  Plan:  · Will continue Bentyl for now, monitor for improvement  · Initiate pantoprazole 40 mg daily p o  Lisbeth Faust   · Mesenteric duplex completed, results pending official read  · Given potential anginal equivalent, consider trialing sublingual nitroglycerin for further episodes of abdominal pain  · Suspect functional component, can trial TCA as well  -RUQ U/S ordered     Hypertension  Assessment & Plan  See assessment and plan under hypertensive urgency above  Smoker  Assessment & Plan  Admitting lung imaging does show some mild emphysema  Plan  · Tobacco cessation materials on discharge  · Nicotine patch while admitted  · Tobacco cessation strongly encouraged as patient likely has underlying baseline COPD    Abnormal finding on imaging of liver  Assessment & Plan  Possible early cirrhosis noted on CT scan obtained on arrival   Chronic hepatitis panel did show high reactivity for hep C antibodies  Plan:  · Will recommend outpatient elastography  · Will check hep C viral RNA    Positive D dimer  Assessment & Plan  Patient with a positive D-dimer from her previous admission at [facility] 10 days ago at 2 17   CT PE protocol negative  Patient's D-dimer on admission 3 09  Doubt that her dyspnea is secondary to any new pulmonary embolism  Patient is not tachycardic, no ECG findings suggesting new PE  No history of DVT or PE  Plan:  · Will plan to treat new onset heart failure as above    Hypertensive urgency  Assessment & Plan  Patient with a blood pressure of 198/111 on arrival   Lab work indicating no signs of end-organ damage  Blood pressures are improving on antihypertensive  Plan:  · Labetalol 10 mg as needed every 6 hours for SBP greater than 180  · Continue amlodipine 5 mg daily  · Monitor pressures closely    Shortness of breath  Assessment & Plan  Patient presented with shortness of breath initially concerning for heart failure etiology given radiograph and cardiac serologies, however etiology appears unclear at this time  Shortness of breath seems secondary to bouts of abdominal pain  Additionally, she remains euvolemic on exam today without any signs or symptoms concerning for heart failure      Plan:  · Will continue holding off further diuresis  · TTE obtained yesterday, await official read, however as patient did have profoundly elevated NT proBNP with signs of LVH on presenting EKG, anticipate ejection fraction to be reduced and patient to require ischemic evaluation, either in the inpatient or outpatient setting    Pulmonary nodule  Assessment & Plan  5 mm pulmonary nodule in the right middle lobe identified on arrival   Patient has a long standing history of smoking, would recommend outpatient follow-up for repeat imaging  Hyperlipidemia  Assessment & Plan  Patient has not seen a physician in years  Last lipid panel in  revealed a total cholesterol 230, triglycerides 163, HDL 43,   Patient is not on any medication as an outpatient  Lipid panel obtained on arrival shows total cholesterol 199, triglycerides 103, HDL 44,   Plan:  · ASCVD risk of 10 8%, can consider statin therapy as an outpatient or inpatient pending further results      Disposition: Pending Hep C RNA / Pending Cards Consult    SUBJECTIVE     Patient seen and examined  Endorsing right sided abdominal pain  Denies any nausea, vomiting, diarrhea, constipation  Worsening pain after eating today  Asking for a pain medication so she can go to sleep  She denies any CP, SOB, palpitations  OBJECTIVE     Vitals:    21 2255 21 0550 21 0725 21 0830   BP:   119/96    BP Location:       Pulse: 86  83    Resp:   18    Temp: 98 6 °F (37 °C)      TempSrc:       SpO2: 98%  94% 98%   Weight:  69 kg (152 lb 1 9 oz)     Height:          Temperature:   Temp (24hrs), Av 6 °F (37 °C), Min:98 5 °F (36 9 °C), Max:98 6 °F (37 °C)    Temperature: 98 6 °F (37 °C)  Intake & Output:  I/O        07 -  0700  07 -  0700    P  O  400 120    Total Intake(mL/kg) 400 (5 8) 120 (1 7)    Urine (mL/kg/hr)  400 (0 2)    Total Output  400    Net +400 -280              Weights:   IBW (Ideal Body Weight): 54 7 kg    Body mass index is 26 11 kg/m²  Weight (last 2 days)     Date/Time   Weight    05/03/21 0550   69 (152 12)    05/02/21 0452   69 (152 12)            Physical Exam  Constitutional:       Appearance: She is normal weight  She is not ill-appearing, toxic-appearing or diaphoretic  Comments: Seems anxious / hyperventilation    HENT:      Head: Normocephalic and atraumatic  Nose: No congestion or rhinorrhea  Cardiovascular:      Rate and Rhythm: Normal rate and regular rhythm  Pulses: Normal pulses  Heart sounds: Normal heart sounds  No murmur  No friction rub  No gallop  Pulmonary:      Effort: Pulmonary effort is normal  No respiratory distress  Breath sounds: No wheezing or rales  Comments: Decreased breath sounds    Abdominal:      General: Abdomen is flat  Bowel sounds are normal  There is distension  Palpations: Abdomen is soft  There is no mass  Tenderness: There is abdominal tenderness (right upper quadrant)  There is no right CVA tenderness, left CVA tenderness, guarding or rebound  Hernia: No hernia is present  Musculoskeletal:      Right lower leg: No edema  Left lower leg: No edema  Neurological:      General: No focal deficit present  Mental Status: She is alert and oriented to person, place, and time  Psychiatric:         Mood and Affect: Mood normal          Behavior: Behavior normal        LABORATORY DATA     Labs: I have personally reviewed pertinent reports    Results from last 7 days   Lab Units 05/03/21  0645 05/02/21  0451 05/01/21  0604 04/30/21  1408   WBC Thousand/uL 9 89 9 16 8 00 8 58   HEMOGLOBIN g/dL 12 3 12 8 12 3 12 4   HEMATOCRIT % 37 2 38 0 37 1 37 6   PLATELETS Thousands/uL 320 324 300 288   NEUTROS PCT % 67 62  --  77*   MONOS PCT % 13* 15*  --  10      Results from last 7 days   Lab Units 05/03/21  0520 05/02/21  0451 05/01/21  0604 04/30/21  1408   POTASSIUM mmol/L 3 6 3 5 3 8 3 4*   CHLORIDE mmol/L 108 104 108 111*   CO2 mmol/L 25 30 25 24   BUN mg/dL 18 24 21 15   CREATININE mg/dL 0 96 1 13 1 13 0 99   CALCIUM mg/dL 9 2 9 3 9 3 9 4   ALK PHOS U/L  --  117*  --  125*   ALT U/L  --  38  --  37   AST U/L  --  21  --  25     Results from last 7 days   Lab Units 05/02/21  0451 05/01/21  0604 04/30/21  1408   MAGNESIUM mg/dL 2 3 2 3 2 3     Results from last 7 days   Lab Units 05/01/21  0604   PHOSPHORUS mg/dL 3 7              Results from last 7 days   Lab Units 04/30/21  1408   TROPONIN I ng/mL 0 03       IMAGING & DIAGNOSTIC TESTING     Radiology Results: I have personally reviewed pertinent reports  Xr Chest 1 View Portable    Result Date: 4/30/2021  Impression: Cardiomegaly with vascular congestion likely on the basis of CHF  Workstation performed: [id number]     Other Diagnostic Testing: I have personally reviewed pertinent reports      ACTIVE MEDICATIONS     Current Facility-Administered Medications   Medication Dose Route Frequency    acetaminophen (TYLENOL) tablet 975 mg  975 mg Oral Q6H PRN    albuterol (PROVENTIL HFA,VENTOLIN HFA) inhaler 2 puff  2 puff Inhalation Q4H PRN    atorvastatin (LIPITOR) tablet 40 mg  40 mg Oral After Dinner    calcium carbonate (TUMS) chewable tablet 1,000 mg  1,000 mg Oral Daily PRN    dicyclomine (BENTYL) tablet 20 mg  20 mg Oral TID AC    enoxaparin (LOVENOX) subcutaneous injection 40 mg  40 mg Subcutaneous Q24H WILDER    Labetalol HCl (NORMODYNE) injection 10 mg  10 mg Intravenous Q6H PRN    lisinopril (ZESTRIL) tablet 5 mg  5 mg Oral Daily    melatonin tablet 3 mg  3 mg Oral HS    nicotine (NICODERM CQ) 7 mg/24hr TD 24 hr patch 1 patch  1 patch Transdermal Daily    pantoprazole (PROTONIX) EC tablet 40 mg  40 mg Oral Early Morning    senna (SENOKOT) tablet 8 6 mg  1 tablet Oral HS    simethicone (MYLICON) chewable tablet 80 mg  80 mg Oral Q6H PRN       VTE Pharmacologic Prophylaxis: Enoxaparin (Lovenox)  VTE Mechanical Prophylaxis: sequential compression device    Portions of the record may have been created with voice recognition software  Occasional wrong word or "sound a like" substitutions may have occurred due to the inherent limitations of voice recognition software    Read the chart carefully and recognize, using context, where substitutions have occurred   ==  Eladio Robison MD  [address]  Internal Medicine Residency PGY-1

## 2021-05-03 NOTE — UTILIZATION REVIEW
Continued Stay Review    Date:   05/03/2021                        Current Patient Class: Inpatient Current Level of Care: Med/Surg    HPI:61 y o  female initially admitted on 90/86/0487  Combined systolic / diastolic CHF  Assessment/Plan: Day 2  Volume overloaded give another iv lasix  Start lisinaprol and lipitor  Hold BB and aldactone at this time  Monitor I&O  Daily weights  05/03/2021  Consult Cardio:   [facility] for ischemic evaluation  Post cath [facility] medical therapy  Cardiac MRI if LHC unrevealing        Vital Signs: /96   Pulse 85   Temp 99 4 °F (37 4 °C)   Resp 18   Ht 5' 4" (1 626 m)   Wt 69 kg (152 lb 1 9 oz)   SpO2 96%   BMI 26 11 kg/m²       Pertinent Labs/Diagnostic Results:   Results from last 7 days   Lab Units 04/30/21  1416   SARS-COV-2  Negative     Results from last 7 days   Lab Units 05/03/21  0645 05/02/21  0451 05/01/21  0604 04/30/21  1408   WBC Thousand/uL 9 89 9 16 8 00 8 58   HEMOGLOBIN g/dL 12 3 12 8 12 3 12 4   HEMATOCRIT % 37 2 38 0 37 1 37 6   PLATELETS Thousands/uL 320 324 300 288   NEUTROS ABS Thousands/µL 6 68 5 78  --  6 59     Results from last 7 days   Lab Units 05/03/21  0520 05/02/21  0451 05/01/21  0604 04/30/21  1408   SODIUM mmol/L 139 138 140 141   POTASSIUM mmol/L 3 6 3 5 3 8 3 4*   CHLORIDE mmol/L 108 104 108 111*   CO2 mmol/L 25 30 25 24   ANION GAP mmol/L 6 4 7 6   BUN mg/dL 18 24 21 15   CREATININE mg/dL 0 96 1 13 1 13 0 99   EGFR ml/min/1 73sq m 65 53 53 63   CALCIUM mg/dL 9 2 9 3 9 3 9 4   MAGNESIUM mg/dL  --  2 3 2 3 2 3   PHOSPHORUS mg/dL  --   --  3 7  --      Results from last 7 days   Lab Units 05/02/21  0451 04/30/21  1408   AST U/L 21 25   ALT U/L 38 37   ALK PHOS U/L 117* 125*   TOTAL PROTEIN g/dL 8 1 8 1   ALBUMIN g/dL 3 0* 3 1*   TOTAL BILIRUBIN mg/dL 0 49 0 59     Results from last 7 days   Lab Units 05/03/21  0520 05/02/21  0451 05/01/21  0604 04/30/21  1408   GLUCOSE RANDOM mg/dL 122 116 122 117     Results from last 7 days   Lab Units 04/30/21  1408   HEMOGLOBIN A1C % 5 4   EAG mg/dl 108     Results from last 7 days   Lab Units 04/30/21  1408   TROPONIN I ng/mL 0 03     Results from last 7 days   Lab Units 04/30/21  1416   D-DIMER QUANTITATIVE ug/ml FEU 3 09*     Results from last 7 days   Lab Units 04/30/21  1408   TSH 3RD GENERATON uIU/mL 3 610     Results from last 7 days   Lab Units 04/30/21  1408   NT-PRO BNP pg/mL 6,281*     Results from last 7 days   Lab Units 05/01/21  1247   HEP B S AG  Non-reactive   HEP C AB  High Reactive*   HEP B C IGM  Non-reactive   HEP B C TOTAL AB  Non-reactive     Results from last 7 days   Lab Units 04/30/21  1408   LIPASE u/L 93     Medications:   Scheduled Medications:  aspirin, 324 mg, Oral, Once  atorvastatin, 40 mg, Oral, After Dinner  dicyclomine, 20 mg, Oral, TID AC  enoxaparin, 40 mg, Subcutaneous, Q24H [facility]  lisinopril, 5 mg, Oral, Daily  melatonin, 3 mg, Oral, HS  metoprolol tartrate, 25 mg, Oral, Q12H WILDER  nicotine, 1 patch, Transdermal, Daily  pantoprazole, 40 mg, Oral, Early Morning  senna, 1 tablet, Oral, HS      Continuous IV Infusions:     PRN Meds:  acetaminophen, 975 mg, Oral, Q6H PRN  albuterol, 2 puff, Inhalation, Q4H PRN  calcium carbonate, 1,000 mg, Oral, Daily PRN  Labetalol HCl, 10 mg, Intravenous, Q6H PRN  simethicone, 80 mg, Oral, Q6H PRN        Discharge Plan: D    Network Utilization Review Department  ATTENTION: Please call with any questions or concerns to [phone] and carefully listen to the prompts so that you are directed to the right person  All voicemails are confidential   Daniel Foss all requests for admission clinical reviews, approved or denied determinations and any other requests to dedicated fax number below belonging to the campus where the patient is receiving treatment   List of dedicated fax numbers for the Facilities:  FACILITY NAME UR FAX NUMBER   ADMISSION DENIALS (Administrative/Medical Necessity) [phone]   [address] (Maternity/NICU/Pediatrics) [address] [facility] [place] [facility] [address] [address] [place] [address] [address] [facility] [phone] [place] [address] [phone]
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
## 2021-05-03 NOTE — PROGRESS NOTES
met with [facility] who had taken a walk to the [facility] area  [facility] shared about housing insecurity, and pain management concerns, as well as some personal history   has contacted Care manager Hilda Adan  to follow, and informed RN of concerns   prayed with [facility] and encouraged positive thinking and a reliance on the mariana she shared  She stated that she believes her life is in God's hands but has a sense of confusion and hurt about why this would happen to her and have no help for it

## 2021-05-03 NOTE — UTILIZATION REVIEW
Inpatient Admission Authorization Request   NOTIFICATION OF INPATIENT ADMISSION/INPATIENT AUTHORIZATION REQUEST   SERVICING FACILITY:   [facility]  Address: [address] [place] [address], [address] [place] [id number]  Tax ID: [id number]  NPI: [id number]  Place of Service: Inpatient [address] [facility] Code: 24     ATTENDING PROVIDER:  Attending Name and NPI#: Vu Tipton Md [[id number]]  Address: [address] [place] [address], [address] [place] [address]  Phone: [phone]     UTILIZATION REVIEW CONTACT:  Tamika Styles Utilization   Network Utilization Review Department  Phone: [phone]  Fax: [phone]  Email: [email or web address]     PHYSICIAN ADVISORY SERVICES:  FOR BTUO-GL-JNLS REVIEW - MEDICAL NECESSITY DENIAL  Phone: [phone]  Fax: [phone]  Email: [email or web address]     TYPE OF REQUEST:  Inpatient Status     ADMISSION INFORMATION:  ADMISSION DATE/TIME: 5/2/21 1008  PATIENT DIAGNOSIS CODE/DESCRIPTION:  Back pain [M54 9]  Chest pain [R07 9]  Dyspnea [R06 00]  SOB (shortness of breath) [R06 02]  Hypertension [I10]  Elevated brain natriuretic peptide (BNP) level [R79 89]  Elevated d-dimer [R79 89]  DISCHARGE DATE/TIME: No discharge date for patient encounter  DISCHARGE DISPOSITION (IF DISCHARGED): Left against medical advice or discontinued care     IMPORTANT INFORMATION:  Please contact the Tamika Styles directly with any questions or concerns regarding this request  Department voicemails are confidential     Send requests for admission clinical reviews, concurrent reviews, approvals, and administrative denials due to lack of clinical to fax [phone]

## 2021-05-04 PROBLEM — N17.9 AKI (ACUTE KIDNEY INJURY) (HCC): Status: ACTIVE | Noted: 2021-05-04

## 2021-05-04 PROBLEM — N17.9 AKI (ACUTE KIDNEY INJURY) (HCC): Status: RESOLVED | Noted: 2021-05-04 | Resolved: 2021-05-04

## 2021-05-04 PROBLEM — I50.9 NEW ONSET OF CONGESTIVE HEART FAILURE (HCC): Status: ACTIVE | Noted: 2021-05-03

## 2021-05-04 PROBLEM — B18.2 CHRONIC HEPATITIS C VIRUS INFECTION (HCC): Status: ACTIVE | Noted: 2021-05-04

## 2021-05-04 LAB
ANION GAP SERPL CALCULATED.3IONS-SCNC: 7 MMOL/L (ref 4–13)
ANION GAP SERPL CALCULATED.3IONS-SCNC: 7 MMOL/L (ref 4–13)
ATRIAL RATE: 71 BPM
ATRIAL RATE: 71 BPM
BASOPHILS # BLD AUTO: 0.08 THOUSANDS/ΜL (ref 0–0.1)
BASOPHILS NFR BLD AUTO: 1 % (ref 0–1)
BUN SERPL-MCNC: 28 MG/DL (ref 5–25)
BUN SERPL-MCNC: 30 MG/DL (ref 5–25)
CALCIUM SERPL-MCNC: 9.5 MG/DL (ref 8.3–10.1)
CALCIUM SERPL-MCNC: 9.5 MG/DL (ref 8.3–10.1)
CHLORIDE SERPL-SCNC: 105 MMOL/L (ref 100–108)
CHLORIDE SERPL-SCNC: 106 MMOL/L (ref 100–108)
CO2 SERPL-SCNC: 26 MMOL/L (ref 21–32)
CO2 SERPL-SCNC: 27 MMOL/L (ref 21–32)
CREAT SERPL-MCNC: 1.24 MG/DL (ref 0.6–1.3)
CREAT SERPL-MCNC: 1.45 MG/DL (ref 0.6–1.3)
EOSINOPHIL # BLD AUTO: 0.23 THOUSAND/ΜL (ref 0–0.61)
EOSINOPHIL NFR BLD AUTO: 3 % (ref 0–6)
ERYTHROCYTE [DISTWIDTH] IN BLOOD BY AUTOMATED COUNT: 13 % (ref 11.6–15.1)
GFR SERPL CREATININE-BSD FRML MDRD: 39 ML/MIN/1.73SQ M
GFR SERPL CREATININE-BSD FRML MDRD: 48 ML/MIN/1.73SQ M
GLUCOSE SERPL-MCNC: 108 MG/DL (ref 65–140)
GLUCOSE SERPL-MCNC: 202 MG/DL (ref 65–140)
HCT VFR BLD AUTO: 38.5 % (ref 34.8–46.1)
HCV RNA SERPL NAA+PROBE-ACNC: NORMAL IU/ML
HGB BLD-MCNC: 12.6 G/DL (ref 11.5–15.4)
IMM GRANULOCYTES # BLD AUTO: 0.07 THOUSAND/UL (ref 0–0.2)
IMM GRANULOCYTES NFR BLD AUTO: 1 % (ref 0–2)
LACTATE SERPL-SCNC: 1.7 MMOL/L (ref 0.5–2)
LYMPHOCYTES # BLD AUTO: 1.55 THOUSANDS/ΜL (ref 0.6–4.47)
LYMPHOCYTES NFR BLD AUTO: 19 % (ref 14–44)
MCH RBC QN AUTO: 30.1 PG (ref 26.8–34.3)
MCHC RBC AUTO-ENTMCNC: 32.7 G/DL (ref 31.4–37.4)
MCV RBC AUTO: 92 FL (ref 82–98)
MONOCYTES # BLD AUTO: 1.27 THOUSAND/ΜL (ref 0.17–1.22)
MONOCYTES NFR BLD AUTO: 16 % (ref 4–12)
NEUTROPHILS # BLD AUTO: 4.84 THOUSANDS/ΜL (ref 1.85–7.62)
NEUTS SEG NFR BLD AUTO: 60 % (ref 43–75)
NRBC BLD AUTO-RTO: 0 /100 WBCS
P AXIS: 29 DEGREES
P AXIS: 31 DEGREES
PLATELET # BLD AUTO: 385 THOUSANDS/UL (ref 149–390)
PMV BLD AUTO: 10.7 FL (ref 8.9–12.7)
POTASSIUM SERPL-SCNC: 3.1 MMOL/L (ref 3.5–5.3)
POTASSIUM SERPL-SCNC: 3.6 MMOL/L (ref 3.5–5.3)
PR INTERVAL: 150 MS
PR INTERVAL: 150 MS
QRS AXIS: 54 DEGREES
QRS AXIS: 56 DEGREES
QRSD INTERVAL: 94 MS
QRSD INTERVAL: 96 MS
QT INTERVAL: 438 MS
QT INTERVAL: 440 MS
QTC INTERVAL: 475 MS
QTC INTERVAL: 478 MS
RBC # BLD AUTO: 4.19 MILLION/UL (ref 3.81–5.12)
SODIUM SERPL-SCNC: 138 MMOL/L (ref 136–145)
SODIUM SERPL-SCNC: 140 MMOL/L (ref 136–145)
T WAVE AXIS: 244 DEGREES
T WAVE AXIS: 257 DEGREES
TEST INFORMATION: NORMAL
VENTRICULAR RATE: 71 BPM
VENTRICULAR RATE: 71 BPM
WBC # BLD AUTO: 8.04 THOUSAND/UL (ref 4.31–10.16)

## 2021-05-04 PROCEDURE — 83605 ASSAY OF LACTIC ACID: CPT | Performed by: HOSPITALIST

## 2021-05-04 PROCEDURE — 85025 COMPLETE CBC W/AUTO DIFF WBC: CPT | Performed by: STUDENT IN AN ORGANIZED HEALTH CARE EDUCATION/TRAINING PROGRAM

## 2021-05-04 PROCEDURE — 99232 SBSQ HOSP IP/OBS MODERATE 35: CPT | Performed by: INTERNAL MEDICINE

## 2021-05-04 PROCEDURE — 99255 IP/OBS CONSLTJ NEW/EST HI 80: CPT | Performed by: INTERNAL MEDICINE

## 2021-05-04 PROCEDURE — 80307 DRUG TEST PRSMV CHEM ANLYZR: CPT | Performed by: STUDENT IN AN ORGANIZED HEALTH CARE EDUCATION/TRAINING PROGRAM

## 2021-05-04 PROCEDURE — 80048 BASIC METABOLIC PNL TOTAL CA: CPT | Performed by: STUDENT IN AN ORGANIZED HEALTH CARE EDUCATION/TRAINING PROGRAM

## 2021-05-04 PROCEDURE — 93010 ELECTROCARDIOGRAM REPORT: CPT | Performed by: INTERNAL MEDICINE

## 2021-05-04 RX ORDER — POTASSIUM CHLORIDE 20 MEQ/1
40 TABLET, EXTENDED RELEASE ORAL ONCE
Status: DISCONTINUED | OUTPATIENT
Start: 2021-05-04 | End: 2021-05-04

## 2021-05-04 RX ORDER — POTASSIUM CHLORIDE 20 MEQ/1
40 TABLET, EXTENDED RELEASE ORAL ONCE
Status: COMPLETED | OUTPATIENT
Start: 2021-05-04 | End: 2021-05-04

## 2021-05-04 RX ORDER — SODIUM CHLORIDE 9 MG/ML
50 INJECTION, SOLUTION INTRAVENOUS CONTINUOUS
Status: DISCONTINUED | OUTPATIENT
Start: 2021-05-05 | End: 2021-05-04

## 2021-05-04 RX ORDER — SODIUM CHLORIDE 9 MG/ML
50 INJECTION, SOLUTION INTRAVENOUS CONTINUOUS
Status: DISPENSED | OUTPATIENT
Start: 2021-05-05 | End: 2021-05-05

## 2021-05-04 RX ORDER — ASPIRIN 81 MG/1
324 TABLET, CHEWABLE ORAL ONCE
Status: DISCONTINUED | OUTPATIENT
Start: 2021-05-04 | End: 2021-05-06

## 2021-05-04 RX ORDER — POTASSIUM CHLORIDE 14.9 MG/ML
20 INJECTION INTRAVENOUS ONCE
Status: DISCONTINUED | OUTPATIENT
Start: 2021-05-04 | End: 2021-05-04

## 2021-05-04 RX ADMIN — MELATONIN TAB 3 MG 3 MG: 3 TAB at 21:48

## 2021-05-04 RX ADMIN — METOPROLOL TARTRATE 25 MG: 25 TABLET, FILM COATED ORAL at 21:48

## 2021-05-04 RX ADMIN — LISINOPRIL 5 MG: 2.5 TABLET ORAL at 09:20

## 2021-05-04 RX ADMIN — POTASSIUM CHLORIDE 40 MEQ: 1500 TABLET, EXTENDED RELEASE ORAL at 06:13

## 2021-05-04 RX ADMIN — DICYCLOMINE HYDROCHLORIDE 20 MG: 20 TABLET ORAL at 06:13

## 2021-05-04 RX ADMIN — ATORVASTATIN CALCIUM 40 MG: 40 TABLET, FILM COATED ORAL at 17:25

## 2021-05-04 RX ADMIN — DICYCLOMINE HYDROCHLORIDE 20 MG: 20 TABLET ORAL at 11:17

## 2021-05-04 RX ADMIN — NICOTINE 7 MG/24 HR DAILY TRANSDERMAL PATCH 1 PATCH: at 09:22

## 2021-05-04 RX ADMIN — METOPROLOL TARTRATE 25 MG: 25 TABLET, FILM COATED ORAL at 09:20

## 2021-05-04 RX ADMIN — PANTOPRAZOLE SODIUM 40 MG: 40 TABLET, DELAYED RELEASE ORAL at 06:13

## 2021-05-04 RX ADMIN — DICYCLOMINE HYDROCHLORIDE 20 MG: 20 TABLET ORAL at 17:25
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
## 2021-05-04 NOTE — UTILIZATION REVIEW
Initial Clinical Review    OBS 4/30 @ 4979 UPGRADED TO INPATIENT 5/2 @ [facility] ABDOMINAL PAIN    Admission: Date/Time/Statement:   Admission Orders (From admission, onward)     Ordered        05/02/21 1008  Inpatient Admission  Once                Orders Placed This Encounter   Procedures    Inpatient Admission     Standing Status:   Standing     Number of Occurrences:   1     Order Specific Question:   Level of Care     Answer:   Med Surg [16]     Order Specific Question:   Estimated length of stay     Answer:   More than 2 Midnights     Order Specific Question:   Certification     Answer:   I certify that inpatient services are medically necessary for this patient for a duration of greater than two midnights  See H&P and MD Progress Notes for additional information about the patient's course of treatment  ED Arrival Information     Expected Arrival Acuity Means of Arrival Escorted By Service Admission Type    - 4/30/2021 13:19 Urgent Ambulance [facility] General Medicine Urgent    Arrival Complaint    SOB        Chief Complaint   Patient presents with    Abdominal Pain     Abdominal pain radiating to the left flank, chest and back  Denies N/V/D  Going on for 2 weeks       Initial Presentation: 64 y/o female with PMHx of HLD, chronic pain, current smoker, no medical f/u in years, who presents to ED by EMS with c/o dspnea and abdominal pain radiating around to her back as well as chest tightness  PT seen in ED 10 days ago with same sxs, admitted for w/u but left AMA  The symptoms have been worsening over the past 10 days, especially her dyspnea on exertion   The patient endorses early satiety and some abdominal distention   On exam, (+) JVD, (+) hepatojugular reflux  B/L wheezing, B/L rales at bases, abd distention and tenderness in suprapubic, LUQ, LLQ  K 3 4, , Alk phos 125  /111    Admit observation to M/S/Tele unit with new onset heart failure and hypertensive urgency-- Telem monitoring,  IV lasix, strict I/O's  2 g sodium restriction, 1800 ml/day fluid restriction  TTE ordered  Bentyl and Tums prn  Lipase ordered  Labetalol 10 mg prn q 6hr for SBP >180      Date: 5/1  --  C/o chronic abdominal pain which has been ongoing for about 2 weeks associated with shortness of breath present even at rest  Tole diet  Continue bentyl for now  Pantoprazole po daily  Mesenteric duplex pending  BP remains elevated, add amlodipine  Hep panel ordered  D/c IV lasix  Echo pending  Date: 5/2 --  Pt continues with generalized nonspecific abd pain, received Bentyl and Senokot during the night, and Tylenol this morning  Suspect functional disorder  Mentions that she was on MS contin in the past, raising concerns for secondary gain  Awaiting TTE and mesenteric doppler read  Will need appropriate workup or management of hypertensive heart disease, and likely chronic lung disease, much of it as outpatient  Obviously may need inpatient workup if systolic dysfunction on TTE  Will avoid narcotics  Upgraded to inpatient        ED Triage Vitals   Temperature Pulse Respirations Blood Pressure SpO2   04/30/21 1332 04/30/21 1325 04/30/21 1325 04/30/21 1325 04/30/21 1325   98 4 °F (36 9 °C) 100 18 (!) 198/111 97 %      Temp Source Heart Rate Source Patient Position - Orthostatic VS BP Location FiO2 (%)   04/30/21 1332 04/30/21 1325 04/30/21 2051 04/30/21 1325 --   Oral Monitor Sitting Right arm       Pain Score       04/30/21 1416       Worst Possible Pain          Wt Readings from Last 1 Encounters:   05/04/21 68 4 kg (150 lb 12 7 oz)     Additional Vital Signs:   Date/Time  Temp  Pulse  Resp  BP  MAP (mmHg)  SpO2  O2 Device   05/02/21 06:44:58  98 7 °F (37 1 °C)  78  16  131/83  99  94 %  None (Room air)   05/02/21 02:52:24  100 4 °F (38 °C)  109Abnormal   18  131/86  101  89 %Abnormal   None (Room air)   05/01/21 23:10:29  100 2 °F (37 9 °C)  92  19  116/73  87  93 %  None (Room air) 05/01/21 18:50:06  --  88  --  142/95  111  95 %  --   05/01/21 1700  --  --  --  --  --  --  None (Room air)   05/01/21 15:43:17  98 9 °F (37 2 °C)  --  --  146/99  115  --  --   05/01/21 12:44:19  98 1 °F (36 7 °C)  --  20  156/100  119  --  --   05/01/21 09:44:35  --  --  --  155/98  117  --  --   05/01/21 07:23:20  98 9 °F (37 2 °C)  82  17  156/96  116  96 %  None (Room air)   05/01/21 03:29:13  --  84  --  155/96  116  95 %  --   05/01/21 00:06:49  98 7 °F (37 1 °C)  80  18  140/95  110  95 %  None (Room air)   04/30/21 20:51:09  99 2 °F (37 3 °C)  90  20  152/106Abnormal   121  95 %  None (Room air)   04/30/21 1816  --  86  18  162/109Abnormal   --  97 %  --   04/30/21 1815  --  88  --  --  --  98 %  None (Room air)   04/30/21 1611  --  80  18  164/100  125  94 %  --   04/30/21 1559  --  92  --  164/100  --  --  --   04/30/21 1332  98 4 °F (36 9 °C)  --  --  --  --  --  --   04/30/21 1325  --  100  18  198/111Abnormal   --  97 %  None (Room air)       Pertinent Labs/Diagnostic Test Results:   CXR 4/30 - Cardiomegaly with vascular congestion likely on the basis of CHF       Results from last 7 days   Lab Units 04/30/21  1416   SARS-COV-2  Negative     Results from last 7 days   Lab Units 05/04/21  0457 05/03/21  0645 05/02/21  0451 05/01/21  0604 04/30/21  1408   WBC Thousand/uL 8 04 9 89 9 16 8 00 8 58   HEMOGLOBIN g/dL 12 6 12 3 12 8 12 3 12 4   HEMATOCRIT % 38 5 37 2 38 0 37 1 37 6   PLATELETS Thousands/uL 385 320 324 300 288   NEUTROS ABS Thousands/µL 4 84 6 68 5 78  --  6 59     Results from last 7 days   Lab Units 05/04/21  0457 05/04/21  0013 05/03/21  0520 05/02/21  0451 05/01/21  0604 04/30/21  1408   SODIUM mmol/L 140 138 139 138 140 141   POTASSIUM mmol/L 3 6 3 1* 3 6 3 5 3 8 3 4*   CHLORIDE mmol/L 106 105 108 104 108 111*   CO2 mmol/L 27 26 25 30 25 24   ANION GAP mmol/L 7 7 6 4 7 6   BUN mg/dL 30* 28* 18 24 21 15   CREATININE mg/dL 1 24 1 45* 0 96 1 13 1 13 0 99   EGFR ml/min/1 73sq m 48 39 65 53 53 63   CALCIUM mg/dL 9 5 9 5 9 2 9 3 9 3 9 4   MAGNESIUM mg/dL  --   --   --  2 3 2 3 2 3   PHOSPHORUS mg/dL  --   --   --   --  3 7  --      Results from last 7 days   Lab Units 05/02/21  0451 04/30/21  1408   AST U/L 21 25   ALT U/L 38 37   ALK PHOS U/L 117* 125*   TOTAL PROTEIN g/dL 8 1 8 1   ALBUMIN g/dL 3 0* 3 1*   TOTAL BILIRUBIN mg/dL 0 49 0 59         Results from last 7 days   Lab Units 05/04/21  0457 05/04/21  0013 05/03/21  0520 05/02/21  0451 05/01/21  0604 04/30/21  1408   GLUCOSE RANDOM mg/dL 108 202* 122 116 122 117         Results from last 7 days   Lab Units 04/30/21  1408   HEMOGLOBIN A1C % 5 4   EAG mg/dl 108     Results from last 7 days   Lab Units 04/30/21  1408   TROPONIN I ng/mL 0 03     Results from last 7 days   Lab Units 04/30/21  1416   D-DIMER QUANTITATIVE ug/ml FEU 3 09*         Results from last 7 days   Lab Units 04/30/21  1408   TSH 3RD GENERATON uIU/mL 3 610       Results from last 7 days   Lab Units 04/30/21  1408   NT-PRO BNP pg/mL 6,281*         Results from last 7 days   Lab Units 05/01/21  1247   HEP B S AG  Non-reactive   HEP C AB  High Reactive*   HEP B C IGM  Non-reactive   HEP B C TOTAL AB  Non-reactive     Results from last 7 days   Lab Units 04/30/21  1408   LIPASE u/L 93         ED Treatment:   Medication Administration from 04/30/2021 1319 to 04/30/2021 2047       Date/Time Order Dose Route Action     04/30/2021 1416 morphine (PF) 4 mg/mL injection 4 mg 4 mg Intravenous Given     04/30/2021 1805 acetaminophen (TYLENOL) tablet 650 mg 650 mg Oral Given     04/30/2021 1803 potassium chloride (K-DUR,KLOR-CON) CR tablet 40 mEq 40 mEq Oral Given     04/30/2021 1805 dicyclomine (BENTYL) tablet 20 mg 20 mg Oral Given     04/30/2021 1805 furosemide (LASIX) injection 40 mg 40 mg Intravenous Given     Past Medical History:   Diagnosis Date    Hyperlipidemia     RSD (reflex sympathetic dystrophy)      Present on Admission:   Hyperlipidemia   Shortness of breath   Hypertensive urgency   Abdominal pain   Pulmonary nodule      Admitting Diagnosis: Back pain [M54 9]  Chest pain [R07 9]  Dyspnea [R06 00]  SOB (shortness of breath) [R06 02]  Hypertension [I10]  Elevated brain natriuretic peptide (BNP) level [R79 89]  Elevated d-dimer [R79 89]  Age/Sex: 63 y o  female  Admission Orders:  Scheduled Medications:  aspirin, 324 mg, Oral, Once  atorvastatin, 40 mg, Oral, After Dinner  dicyclomine, 20 mg, Oral, TID AC  enoxaparin, 40 mg, Subcutaneous, Q24H [address]  lisinopril, 5 mg, Oral, Daily  melatonin, 3 mg, Oral, HS  metoprolol tartrate, 25 mg, Oral, Q12H [address]  nicotine, 1 patch, Transdermal, Daily  pantoprazole, 40 mg, Oral, Early Morning  senna, 1 tablet, Oral, HS         PRN Meds:  acetaminophen, 975 mg, Oral, Q6H PRN 5/2 X1  albuterol, 2 puff, Inhalation, Q4H PRN  calcium carbonate, 1,000 mg, Oral, Daily PRN  Labetalol HCl, 10 mg, Intravenous, Q6H PRN        Network Utilization Review Department  ATTENTION: Please call with any questions or concerns to [phone] and carefully listen to the prompts so that you are directed to the right person  All voicemails are confidential   Alexys Greene all requests for admission clinical reviews, approved or denied determinations and any other requests to dedicated fax number below belonging to the campus where the patient is receiving treatment   List of dedicated fax numbers for the Facilities:  [address] DENIALS (Administrative/Medical Necessity) [phone]   [address] (Maternity/NICU/Pediatrics) 261 [facility] Edwar Doss [phone] [address]  [address] [facility] [place] [address] [phone]

## 2021-05-04 NOTE — PROGRESS NOTES
Advanced Heart Failure Team Progress Note - Trev Clement 63 y o  female MRN: [id number]    Unit/Bed#: CW2 210-02 Encounter: [id number]    Assessment:  Principal Problem:    New onset of congestive heart failure (HCC)  Active Problems:    Abdominal pain    Hyperlipidemia    Pulmonary nodule    Shortness of breath    Hypertensive urgency    Positive D dimer    Abnormal finding on imaging of liver    Smoker    Hypertension    MAHENDRA (acute kidney injury) ([facility] [address] )      Neurohormonal Blockade:  --Beta-Blocker: metoprolol tartrate 25mg BID  --ACEi, ARB or ARNi: lisinopril 5mg daily   --Aldosterone Receptor Blocker:   --Diuretic: s/p IV Lasix 20mg 5/3      Sudden Cardiac Death Risk Reduction:  --ICD: may need LifeVest    Electrical Resynchronization:  --Candidacy for BiV device:    Advanced Therapies:  --Inotrope:   --LVAD/Transplant Candidacy: will continue to monitor    #Newly diagnosed HFrEF 32%  - elevated BNP 6, 281  - echocardiogram: EF 32% with severe diffuse hypokinesis, wall thickness mildly to moderately increased consistent with eccentric hypertrophy  G2DD  - EKG showed LVH  - consider ICM vs NICM, in the setting of longstanding history of uncontrolled HTN and significant tobacco use  #Longstanding uncontrolled HTN  - presented with SBP >190s, started on Lisinopril 5mg daily and metoprolol tartrate 25mg BID  - BP range 120-130/60-80  #MAHENDRA  - noted increasing creatinine, 0 9 --> 1 4  - likely multifactorial, contrast induced, diuretics, ACE  - improved this morning to 1 2  - Nephrology consulted  #Tobacco abuse  - has 43 py smoking history  - nicotine patch  #RLQ abdominal pain  - VAS celiac/mesentery: aorta, celiac, splenic, hepatic arteries patent  SMA and LYDIA normal and patent  - US RUQ: Mild hepatomegaly  Stable compared to prior CT  Top normal caliber common hepatic duct without obstructing etiology identified  No filling defects  Common bile duct tapers normally through the pancreatic head    No intrahepatic ductal dilatation  Gallbladder appears within normal limits  - LFTs and t bili within range  #Positive for Hep C antibody  - denies illicit drug use  - on CT scan with noted changes suspicious for early cirrhosis  #History of RDS with chronic pain syndrome        Plan:  1  For LHC for ischemic evaluation, will be rescheduled due to MAHENDRA  2  Will evaluate for LifeVest prior to discharge  3  Consider cardiac MRI if cardiac cath negative  4  Consider holding Lisinopril 5mg daily  Hold diuretics today  Continue with metoprolol tartrate 25mg BID  5  Appreciate Nephrology eval, optimize kidney function prior to cath  6  Encouraged tobacco cessation  7  Abdominal pain evaluation and pain control per primary team    Case discussed and reviewed with Dr Einar Prader  Further recommendations to follow  Subjective:   Patient seen and examined  Noted increase in kidney function last night which improved this morning  Reports abdominal pain has improved  No shortness of breath, no chest pain  No other complaints endorsed  Hospital Course:   Sammy Deutsch is a 63y o  year old female with a history of uncontrolled hypertension, self reported history of RSD with chronic pain syndrome , tobacco abuse who presented for three week history of RLQ abdominal pain and shortness of breath on exertion  Was noted to have newly diagnosed HFrEF of 32%  Was supposed to undergo LHC however postponed due to MAHENDRA  So far work up has been negative for abdominal pathology on imagings  Has Hep C antibody positive on blood work  Vitals: Blood pressure 123/74, pulse 81, temperature 98 6 °F (37 °C), resp  rate 18, height 5' 4" (1 626 m), weight 68 4 kg (150 lb 12 7 oz), SpO2 94 %  , Body mass index is 25 88 kg/m² ,   Orthostatic Blood Pressures      Most Recent Value   Blood Pressure  123/74 filed at 05/04/2021 6873   Patient Position - Orthostatic VS  Lying filed at 05/02/2021 0644            Intake/Output Summary (Last 24 hours) at 5/4/2021 0846  Last data filed at 5/4/2021 0755  Gross per 24 hour   Intake 300 ml   Output --   Net 300 ml       Physical Exam  Vitals signs and nursing note reviewed  Constitutional:       General: She is not in acute distress  Appearance: She is not toxic-appearing  HENT:      Head: Normocephalic and atraumatic  Nose: Nose normal       Mouth/Throat:      Mouth: Mucous membranes are moist       Pharynx: Oropharynx is clear  Eyes:      Conjunctiva/sclera: Conjunctivae normal       Pupils: Pupils are equal, round, and reactive to light  Neck:      Musculoskeletal: Normal range of motion  Cardiovascular:      Rate and Rhythm: Normal rate and regular rhythm  Pulses: Normal pulses  Pulmonary:      Effort: Pulmonary effort is normal  No respiratory distress  Breath sounds: Normal breath sounds  Abdominal:      General: Bowel sounds are normal       Palpations: Abdomen is soft  Tenderness: There is no abdominal tenderness  There is no guarding  Musculoskeletal: Normal range of motion  General: No swelling or tenderness  Lymphadenopathy:      Cervical: No cervical adenopathy  Skin:     General: Skin is warm and dry  Neurological:      General: No focal deficit present  Mental Status: She is alert and oriented to person, place, and time  Mental status is at baseline  Psychiatric:         Mood and Affect: Mood normal          Thought Content:  Thought content normal       Comments: Anxious about procedure           Current Facility-Administered Medications:     acetaminophen (TYLENOL) tablet 975 mg, 975 mg, Oral, Q6H PRN, Hanh Clement DO, 975 mg at 05/03/21 1800    albuterol (PROVENTIL HFA,VENTOLIN HFA) inhaler 2 puff, 2 puff, Inhalation, Q4H PRN, Cyndy Burton DO    aspirin chewable tablet 324 mg, 324 mg, Oral, Once, Fide Shukla MD    atorvastatin (LIPITOR) tablet 40 mg, 40 mg, Oral, After Tuan Sheldon MD, 40 mg at 05/03/21 1800    calcium carbonate (TUMS) chewable tablet 1,000 mg, 1,000 mg, Oral, Daily PRN, Fito Pulse, DO    dicyclomine (BENTYL) tablet 20 mg, 20 mg, Oral, TID AC, Imelda Harding DO, 20 mg at 05/04/21 3864    enoxaparin (LOVENOX) subcutaneous injection 40 mg, 40 mg, Subcutaneous, Q24H [address], Marilee Restrepo MD, 40 mg at 05/02/21 0850    Labetalol HCl (NORMODYNE) injection 10 mg, 10 mg, Intravenous, Q6H PRN, Imelda Harding DO    lisinopril (ZESTRIL) tablet 5 mg, 5 mg, Oral, Daily, Aristides Thomas MD, 5 mg at 05/03/21 1135    melatonin tablet 3 mg, 3 mg, Oral, HS, Kishor Guillory MD, 3 mg at 05/03/21 2143    metoprolol tartrate (LOPRESSOR) tablet 25 mg, 25 mg, Oral, Q12H [address], Ema Chaves MD, 25 mg at 05/03/21 1526    nicotine (NICODERM CQ) 7 mg/24hr TD 24 hr patch 1 patch, 1 patch, Transdermal, Daily, Imelda Harding DO, 1 patch at 05/03/21 0831    pantoprazole (PROTONIX) EC tablet 40 mg, 40 mg, Oral, Early Morning, Marilee Restrepo MD, 40 mg at 05/04/21 7558    senna (SENOKOT) tablet 8 6 mg, 1 tablet, Oral, HS, Kishor Guillory MD, 8 6 mg at 05/01/21 2348    simethicone (MYLICON) chewable tablet 80 mg, 80 mg, Oral, Q6H PRN, Kishor Guillory MD, 80 mg at 05/03/21 0205    Labs & Results:  Results from last 7 days   Lab Units 05/04/21  0457 05/04/21  0013 05/03/21  0520 05/02/21  0451  04/30/21  1408   POTASSIUM mmol/L 3 6 3 1* 3 6 3 5   < > 3 4*   CO2 mmol/L 27 26 25 30   < > 24   CHLORIDE mmol/L 106 105 108 104   < > 111*   BUN mg/dL 30* 28* 18 24   < > 15   CREATININE mg/dL 1 24 1 45* 0 96 1 13   < > 0 99   ALT U/L  --   --   --  38  --  37   AST U/L  --   --   --  21  --  25    < > = values in this interval not displayed           Results from last 7 days   Lab Units 04/30/21  1408   NT-PRO BNP pg/mL 6,281*     Results from last 7 days   Lab Units 05/04/21  0457   HEMOGLOBIN g/dL 12 6   HEMATOCRIT % 38 5   PLATELETS Thousands/uL 385     Results from last 7 days   Lab Units 04/30/21  1408 TRIGLYCERIDES mg/dL 103   HDL mg/dL 44   LDL CALC mg/dL 134*       Telemetry:   Personally reviewed by Dominique Agarwal MD:    Imaging:  Xr Chest 1 View Portable  Result Date: 4/30/2021  Impression: Cardiomegaly with vascular congestion likely on the basis of CHF  Us Right Upper Quadrant  Result Date: 5/4/2021  Impression: Mild hepatomegaly  Stable compared to prior CT  Top normal caliber common hepatic duct without obstructing etiology identified  No filling defects  Common bile duct tapers normally through the pancreatic head  No intrahepatic ductal dilatation  Gallbladder appears within normal limits    VTE Prophylaxis: Enoxaparin (Lovenox)    ==========================================================================================      Dominique Agarwal MD  Resident PGY-2    ==========================================================================================    Epic/ Allscripts/Care Everywhere records reviewed: Yes    ** Please Note: Fluency DirectDictation voice to text software may have been used in the creation of this document   **

## 2021-05-04 NOTE — PROGRESS NOTES
INTERNAL MEDICINE RESIDENCY PROGRESS NOTE     Name: Grey Madrid   Age & Sex: 63 y o  female   MRN: [id number]  Unit/Bed#: CW2 210-02   Encounter: [id number]  Team: SOD Team A    PATIENT INFORMATION     Name: Grey Madrid   Age & Sex: 63 y o  female   MRN: [id number]  Hospital Stay Days: 2    ASSESSMENT/PLAN     Principal Problem:    New onset of congestive heart failure ([facility] )  Active Problems:    Abdominal pain    Hypertension    Chronic hepatitis C virus infection ([facility] )    Smoker    Hyperlipidemia    Pulmonary nodule    Shortness of breath    Hypertensive urgency    Positive D dimer    Abnormal finding on imaging of liver      * New onset of congestive heart failure (HCC)  Assessment & Plan  Wt Readings from Last 3 Encounters:   05/03/21 69 kg (152 lb 1 9 oz)   04/20/21 56 7 kg (125 lb)   03/23/18 56 7 kg (125 lb)     5/3- Echo EF 83% Grade II diastolic Dysfunction  -Diffuse hypokinesis LA dilated, Mild MR & TR  -presented w/ volume overload had a good response w/ lasix 40mg IV  -Started on Lisinopril 5mg and Lipitor 40mg  -will hold onto B-Blocker / Aldactone at this time  -Can consider Bisoprolol if respiratory distress with other B-blocker as it is more cardioselective  -will give another Lasix 20mg IV  -Monitor I/O   -Daily weight checks  -fluctuating Cr from   9 to 1 4 to 1 24  -Nephro consult per Cards   -Cath tomorrow likely   -Hold lisinopril  -Gentle hydration Pre / Post to prevent Contrast Nephropathy      Abdominal pain  Assessment & Plan  Unclear etiology  Does have findings of atherosclerotic abdominal vessels on recent CT; intermittent abdominal pain although not clearly associated with food intake  Possible early cirrhosis on recent CT scan, however no significant ascites or abdominal distention  Tolerating diet well without nausea/vomiting/diarrhea/constipation        Abdominal exam remains benign and pain continues to be episodic/intermittent with no clear cause or temporality  Plan:  · Will continue Bentyl for now, monitor for improvement  · Initiate pantoprazole 40 mg daily p o  Tiffanie Shell · Mesenteric duplex completed, results pending official read  · Given potential anginal equivalent, consider trialing sublingual nitroglycerin for further episodes of abdominal pain  · Suspect functional component, can trial TCA as well  -RUQ- No gallbladder findings  No intrahepatic biliary dilatation  CBD measures 6 mm  No choledocholithiasis   -Mild hepatomegaly  -  7 cm in the midclavicular line  Surface contour is smooth  Parenchyma:  Echogenicity and echotexture are within normal limits  No evidence of suspicious mass  Limited imaging of the main portal vein shows it to be patent and hepatopetal     Chronic hepatitis C virus infection (HCC)  Assessment & Plan  -Hep C Reactive antibody   -No hx of IVDU / Transfusion / Have not had a sexual partner for decades    -Hep C Quantitative Pending     Hypertension  Assessment & Plan  See assessment and plan under hypertensive urgency above  Smoker  Assessment & Plan  Admitting lung imaging does show some mild emphysema  Plan  · Tobacco cessation materials on discharge  · Nicotine patch while admitted  · Tobacco cessation strongly encouraged as patient likely has underlying baseline COPD    Abnormal finding on imaging of liver  Assessment & Plan  Possible early cirrhosis noted on CT scan obtained on arrival   Chronic hepatitis panel did show high reactivity for hep C antibodies  Plan:  · Will recommend outpatient elastography  · Will check hep C viral RNA    Positive D dimer  Assessment & Plan  Patient with a positive D-dimer from her previous admission at [facility] 10 days ago at 2 17   CT PE protocol negative  Patient's D-dimer on admission 3 09  Doubt that her dyspnea is secondary to any new pulmonary embolism  Patient is not tachycardic, no ECG findings suggesting new PE  No history of DVT or PE      Plan:  · Will plan to treat new onset heart failure as above    Hypertensive urgency  Assessment & Plan  Patient with a blood pressure of 198/111 on arrival   Lab work indicating no signs of end-organ damage  Blood pressures are improving on antihypertensive  Plan:  · Labetalol 10 mg as needed every 6 hours for SBP greater than 180  · Continue amlodipine 5 mg daily  · Monitor pressures closely    Shortness of breath  Assessment & Plan  Patient presented with shortness of breath initially concerning for heart failure etiology given radiograph and cardiac serologies, however etiology appears unclear at this time  Shortness of breath seems secondary to bouts of abdominal pain  Additionally, she remains euvolemic on exam today without any signs or symptoms concerning for heart failure  Plan:  · Will continue holding off further diuresis  · TTE obtained yesterday, await official read, however as patient did have profoundly elevated NT proBNP with signs of LVH on presenting EKG, anticipate ejection fraction to be reduced and patient to require ischemic evaluation, either in the inpatient or outpatient setting  · -See CHF    Pulmonary nodule  Assessment & Plan  5 mm pulmonary nodule in the right middle lobe identified on arrival   Patient has a long standing history of smoking, would recommend outpatient follow-up for repeat imaging  Hyperlipidemia  Assessment & Plan  Patient has not seen a physician in years  Last lipid panel in 2015 revealed a total cholesterol 230, triglycerides 163, HDL 43,   Patient is not on any medication as an outpatient  Lipid panel obtained on arrival shows total cholesterol 199, triglycerides 103, HDL 44,        Plan:  · ASCVD risk of 10 8%, can consider statin therapy as an outpatient or inpatient pending further results    MAHENDRA (acute kidney injury) (HCC)-resolved as of 5/4/2021  Assessment & Plan  -Cr 1 45 from 0 96 baseline around 1  -likely prerenal in etiology  -stable now Cr 1 24  -avoid Hypotension / NSAIDs      Disposition: Cath / Cardiac MRI     SUBJECTIVE     Patient seen and examined  Had difficulty sleeping overnight due to lab draws  Endorses improvement in abdominal pain  Denies any CP, SOB, palpitations, nausea, vomiting, fever / chills  She is homeless and worried about where she will go after discharge  Told her will get CM involved  Also, talked about Hep C and management plan  OBJECTIVE     Vitals:    21 0456 21 0711 21 0830 21 1114   BP:  123/74  121/74   Pulse:  81  75   Resp:  18  18   Temp:  98 6 °F (37 °C)  98 6 °F (37 °C)   TempSrc:       SpO2:  94% 97% 96%   Weight: 68 4 kg (150 lb 12 7 oz)      Height:          Temperature:   Temp (24hrs), Av 8 °F (37 1 °C), Min:98 6 °F (37 °C), Max:99 4 °F (37 4 °C)    Temperature: 98 6 °F (37 °C)  Intake & Output:  I/O        07 -  0700  07 -  0700 / 0701 -  0700    P  O  120 300 0    Total Intake(mL/kg) 120 (1 7) 300 (4 4) 0 (0)    Urine (mL/kg/hr) 400 (0 2)      Total Output 400      Net -280 +300 0               Weights:   IBW (Ideal Body Weight): 54 7 kg    Body mass index is 25 88 kg/m²  Weight (last 2 days)     Date/Time   Weight    21 0456   68 4 (150 79)    21 0550   69 (152 12)    21 0452   69 (152 12)            Physical Exam  Constitutional:       General: She is not in acute distress  Appearance: She is obese  She is not ill-appearing, toxic-appearing or diaphoretic  HENT:      Head: Normocephalic and atraumatic  Nose: No congestion or rhinorrhea  Cardiovascular:      Rate and Rhythm: Normal rate and regular rhythm  Pulses: Normal pulses  Heart sounds: Normal heart sounds  No murmur  No friction rub  No gallop  Pulmonary:      Effort: Pulmonary effort is normal  No respiratory distress  Breath sounds: No stridor  No wheezing, rhonchi or rales  Chest:      Chest wall: No tenderness     Abdominal: General: Bowel sounds are normal  There is distension  Palpations: Abdomen is soft  Tenderness: There is abdominal tenderness (RLQ)  There is no right CVA tenderness, left CVA tenderness, guarding or rebound  Hernia: No hernia is present  Musculoskeletal:      Right lower leg: No edema  Left lower leg: No edema  Neurological:      General: No focal deficit present  Mental Status: She is alert and oriented to person, place, and time  LABORATORY DATA     Labs: I have personally reviewed pertinent reports  Results from last 7 days   Lab Units 05/04/21 0457 05/03/21  0645 05/02/21  0451   WBC Thousand/uL 8 04 9 89 9 16   HEMOGLOBIN g/dL 12 6 12 3 12 8   HEMATOCRIT % 38 5 37 2 38 0   PLATELETS Thousands/uL 385 320 324   NEUTROS PCT % 60 67 62   MONOS PCT % 16* 13* 15*      Results from last 7 days   Lab Units 05/04/21  0457 05/04/21  0013 05/03/21  0520 05/02/21  0451  04/30/21  1408   POTASSIUM mmol/L 3 6 3 1* 3 6 3 5   < > 3 4*   CHLORIDE mmol/L 106 105 108 104   < > 111*   CO2 mmol/L 27 26 25 30   < > 24   BUN mg/dL 30* 28* 18 24   < > 15   CREATININE mg/dL 1 24 1 45* 0 96 1 13   < > 0 99   CALCIUM mg/dL 9 5 9 5 9 2 9 3   < > 9 4   ALK PHOS U/L  --   --   --  117*  --  125*   ALT U/L  --   --   --  38  --  37   AST U/L  --   --   --  21  --  25    < > = values in this interval not displayed  Results from last 7 days   Lab Units 05/02/21  0451 05/01/21  0604 04/30/21  1408   MAGNESIUM mg/dL 2 3 2 3 2 3     Results from last 7 days   Lab Units 05/01/21  0604   PHOSPHORUS mg/dL 3 7          Results from last 7 days   Lab Units 05/04/21  0457   LACTIC ACID mmol/L 1 7     Results from last 7 days   Lab Units 04/30/21  1408   TROPONIN I ng/mL 0 03       IMAGING & DIAGNOSTIC TESTING     Radiology Results: I have personally reviewed pertinent reports  Xr Chest 1 View Portable    Result Date: 4/30/2021  Impression: Cardiomegaly with vascular congestion likely on the basis of CHF  Workstation performed: [id number]     Other Diagnostic Testing: I have personally reviewed pertinent reports  ACTIVE MEDICATIONS     Current Facility-Administered Medications   Medication Dose Route Frequency    acetaminophen (TYLENOL) tablet 975 mg  975 mg Oral Q6H PRN    albuterol (PROVENTIL HFA,VENTOLIN HFA) inhaler 2 puff  2 puff Inhalation Q4H PRN    aspirin chewable tablet 324 mg  324 mg Oral Once    atorvastatin (LIPITOR) tablet 40 mg  40 mg Oral After Dinner    calcium carbonate (TUMS) chewable tablet 1,000 mg  1,000 mg Oral Daily PRN    dicyclomine (BENTYL) tablet 20 mg  20 mg Oral TID AC    enoxaparin (LOVENOX) subcutaneous injection 40 mg  40 mg Subcutaneous Q24H WILDER    Labetalol HCl (NORMODYNE) injection 10 mg  10 mg Intravenous Q6H PRN    melatonin tablet 3 mg  3 mg Oral HS    metoprolol tartrate (LOPRESSOR) tablet 25 mg  25 mg Oral Q12H WILDER    nicotine (NICODERM CQ) 7 mg/24hr TD 24 hr patch 1 patch  1 patch Transdermal Daily    pantoprazole (PROTONIX) EC tablet 40 mg  40 mg Oral Early Morning    senna (SENOKOT) tablet 8 6 mg  1 tablet Oral HS    simethicone (MYLICON) chewable tablet 80 mg  80 mg Oral Q6H PRN       VTE Pharmacologic Prophylaxis: Enoxaparin (Lovenox)  VTE Mechanical Prophylaxis: sequential compression device    Portions of the record may have been created with voice recognition software  Occasional wrong word or "sound a like" substitutions may have occurred due to the inherent limitations of voice recognition software    Read the chart carefully and recognize, using context, where substitutions have occurred   ==  Jacky Tejada MD  [facility]  Internal Medicine Residency PGY-1

## 2021-05-04 NOTE — ASSESSMENT & PLAN NOTE
-Cr 1 45 from 0 96 baseline around 1  -likely prerenal in etiology  -stable now Cr 1 24  -avoid Hypotension / NSAIDs

## 2021-05-04 NOTE — ASSESSMENT & PLAN NOTE
-Hep C Reactive antibody   -No hx of IVDU / Transfusion / Have not had a sexual partner for decades    -5/5 Hep C Quantitative 0

## 2021-05-05 ENCOUNTER — APPOINTMENT (OUTPATIENT)
Dept: NON INVASIVE DIAGNOSTICS | Facility: HOSPITAL | Age: 61
DRG: 469 | End: 2021-05-05
Payer: COMMERCIAL

## 2021-05-05 PROBLEM — R50.9 FEVER: Status: ACTIVE | Noted: 2021-05-05

## 2021-05-05 LAB
AMPHETAMINES SERPL QL SCN: NEGATIVE
AMPHETAMINES UR QL SCN: NEGATIVE NG/ML
ANION GAP SERPL CALCULATED.3IONS-SCNC: 5 MMOL/L (ref 4–13)
ATRIAL RATE: 79 BPM
BARBITURATES UR QL SCN: NEGATIVE NG/ML
BARBITURATES UR QL: NEGATIVE
BASOPHILS # BLD AUTO: 0.05 THOUSANDS/ΜL (ref 0–0.1)
BASOPHILS NFR BLD AUTO: 1 % (ref 0–1)
BENZODIAZ UR QL: NEGATIVE NG/ML
BENZODIAZ UR QL: POSITIVE
BILIRUB UR QL STRIP: NEGATIVE
BUN SERPL-MCNC: 27 MG/DL (ref 5–25)
BZE UR QL: NEGATIVE NG/ML
CALCIUM SERPL-MCNC: 9.9 MG/DL (ref 8.3–10.1)
CANNABINOIDS UR QL SCN: NEGATIVE NG/ML
CHLORIDE SERPL-SCNC: 108 MMOL/L (ref 100–108)
CLARITY UR: CLEAR
CO2 SERPL-SCNC: 26 MMOL/L (ref 21–32)
COCAINE UR QL: NEGATIVE
COLOR UR: YELLOW
CREAT SERPL-MCNC: 1.11 MG/DL (ref 0.6–1.3)
EOSINOPHIL # BLD AUTO: 0.21 THOUSAND/ΜL (ref 0–0.61)
EOSINOPHIL NFR BLD AUTO: 2 % (ref 0–6)
ERYTHROCYTE [DISTWIDTH] IN BLOOD BY AUTOMATED COUNT: 12.9 % (ref 11.6–15.1)
GFR SERPL CREATININE-BSD FRML MDRD: 54 ML/MIN/1.73SQ M
GLUCOSE SERPL-MCNC: 105 MG/DL (ref 65–140)
GLUCOSE UR STRIP-MCNC: NEGATIVE MG/DL
HCT VFR BLD AUTO: 38.9 % (ref 34.8–46.1)
HGB BLD-MCNC: 12.7 G/DL (ref 11.5–15.4)
HGB UR QL STRIP.AUTO: NEGATIVE
IMM GRANULOCYTES # BLD AUTO: 0.08 THOUSAND/UL (ref 0–0.2)
IMM GRANULOCYTES NFR BLD AUTO: 1 % (ref 0–2)
KETONES UR STRIP-MCNC: NEGATIVE MG/DL
LEUKOCYTE ESTERASE UR QL STRIP: NEGATIVE
LYMPHOCYTES # BLD AUTO: 1.83 THOUSANDS/ΜL (ref 0.6–4.47)
LYMPHOCYTES NFR BLD AUTO: 19 % (ref 14–44)
MCH RBC QN AUTO: 30.4 PG (ref 26.8–34.3)
MCHC RBC AUTO-ENTMCNC: 32.6 G/DL (ref 31.4–37.4)
MCV RBC AUTO: 93 FL (ref 82–98)
METHADONE UR QL SCN: NEGATIVE NG/ML
METHADONE UR QL: NEGATIVE
MONOCYTES # BLD AUTO: 1.18 THOUSAND/ΜL (ref 0.17–1.22)
MONOCYTES NFR BLD AUTO: 13 % (ref 4–12)
NEUTROPHILS # BLD AUTO: 6.09 THOUSANDS/ΜL (ref 1.85–7.62)
NEUTS SEG NFR BLD AUTO: 64 % (ref 43–75)
NITRITE UR QL STRIP: NEGATIVE
NRBC BLD AUTO-RTO: 0 /100 WBCS
OPIATES UR QL SCN: NEGATIVE
OPIATES UR QL: NEGATIVE NG/ML
OXYCODONE+OXYMORPHONE UR QL SCN: NEGATIVE
P AXIS: -3 DEGREES
PCP UR QL: NEGATIVE
PCP UR QL: NEGATIVE NG/ML
PH UR STRIP.AUTO: 5 [PH]
PLATELET # BLD AUTO: 398 THOUSANDS/UL (ref 149–390)
PMV BLD AUTO: 10.6 FL (ref 8.9–12.7)
POTASSIUM SERPL-SCNC: 4.1 MMOL/L (ref 3.5–5.3)
PR INTERVAL: 148 MS
PROPOXYPH UR QL SCN: NEGATIVE NG/ML
PROT UR STRIP-MCNC: NEGATIVE MG/DL
QRS AXIS: 5 DEGREES
QRSD INTERVAL: 90 MS
QT INTERVAL: 412 MS
QTC INTERVAL: 472 MS
RBC # BLD AUTO: 4.18 MILLION/UL (ref 3.81–5.12)
SODIUM SERPL-SCNC: 139 MMOL/L (ref 136–145)
SP GR UR STRIP.AUTO: >1.045 (ref 1–1.03)
T WAVE AXIS: 122 DEGREES
THC UR QL: NEGATIVE
UROBILINOGEN UR QL STRIP.AUTO: 0.2 E.U./DL
VENTRICULAR RATE: 79 BPM
WBC # BLD AUTO: 9.44 THOUSAND/UL (ref 4.31–10.16)

## 2021-05-05 PROCEDURE — 93454 CORONARY ARTERY ANGIO S&I: CPT | Performed by: INTERNAL MEDICINE

## 2021-05-05 PROCEDURE — 99232 SBSQ HOSP IP/OBS MODERATE 35: CPT | Performed by: INTERNAL MEDICINE

## 2021-05-05 PROCEDURE — 80048 BASIC METABOLIC PNL TOTAL CA: CPT | Performed by: INTERNAL MEDICINE

## 2021-05-05 PROCEDURE — 99152 MOD SED SAME PHYS/QHP 5/>YRS: CPT

## 2021-05-05 PROCEDURE — 81003 URINALYSIS AUTO W/O SCOPE: CPT | Performed by: STUDENT IN AN ORGANIZED HEALTH CARE EDUCATION/TRAINING PROGRAM

## 2021-05-05 PROCEDURE — 99152 MOD SED SAME PHYS/QHP 5/>YRS: CPT | Performed by: INTERNAL MEDICINE

## 2021-05-05 PROCEDURE — 85025 COMPLETE CBC W/AUTO DIFF WBC: CPT | Performed by: INTERNAL MEDICINE

## 2021-05-05 PROCEDURE — C1769 GUIDE WIRE: HCPCS

## 2021-05-05 PROCEDURE — 93454 CORONARY ARTERY ANGIO S&I: CPT

## 2021-05-05 PROCEDURE — 87040 BLOOD CULTURE FOR BACTERIA: CPT | Performed by: INTERNAL MEDICINE

## 2021-05-05 PROCEDURE — 80307 DRUG TEST PRSMV CHEM ANLYZR: CPT | Performed by: STUDENT IN AN ORGANIZED HEALTH CARE EDUCATION/TRAINING PROGRAM

## 2021-05-05 PROCEDURE — 93010 ELECTROCARDIOGRAM REPORT: CPT | Performed by: INTERNAL MEDICINE

## 2021-05-05 PROCEDURE — 93005 ELECTROCARDIOGRAM TRACING: CPT

## 2021-05-05 PROCEDURE — B2111ZZ FLUOROSCOPY OF MULTIPLE CORONARY ARTERIES USING LOW OSMOLAR CONTRAST: ICD-10-PCS | Performed by: INTERNAL MEDICINE

## 2021-05-05 RX ORDER — ONDANSETRON 2 MG/ML
4 INJECTION INTRAMUSCULAR; INTRAVENOUS EVERY 6 HOURS PRN
Status: DISCONTINUED | OUTPATIENT
Start: 2021-05-05 | End: 2021-05-07 | Stop reason: HOSPADM

## 2021-05-05 RX ORDER — METOPROLOL SUCCINATE 25 MG/1
25 TABLET, EXTENDED RELEASE ORAL 2 TIMES DAILY
Status: DISCONTINUED | OUTPATIENT
Start: 2021-05-05 | End: 2021-05-07 | Stop reason: HOSPADM

## 2021-05-05 RX ORDER — SODIUM CHLORIDE 9 MG/ML
50 INJECTION, SOLUTION INTRAVENOUS CONTINUOUS
Status: DISPENSED | OUTPATIENT
Start: 2021-05-05 | End: 2021-05-05

## 2021-05-05 RX ORDER — VERAPAMIL HYDROCHLORIDE 2.5 MG/ML
INJECTION, SOLUTION INTRAVENOUS CODE/TRAUMA/SEDATION MEDICATION
Status: COMPLETED | OUTPATIENT
Start: 2021-05-05 | End: 2021-05-05

## 2021-05-05 RX ORDER — ACETAMINOPHEN 325 MG/1
650 TABLET ORAL EVERY 4 HOURS PRN
Status: DISCONTINUED | OUTPATIENT
Start: 2021-05-05 | End: 2021-05-07 | Stop reason: HOSPADM

## 2021-05-05 RX ORDER — FENTANYL CITRATE 50 UG/ML
INJECTION, SOLUTION INTRAMUSCULAR; INTRAVENOUS CODE/TRAUMA/SEDATION MEDICATION
Status: COMPLETED | OUTPATIENT
Start: 2021-05-05 | End: 2021-05-05

## 2021-05-05 RX ORDER — NITROGLYCERIN 20 MG/100ML
INJECTION INTRAVENOUS CODE/TRAUMA/SEDATION MEDICATION
Status: COMPLETED | OUTPATIENT
Start: 2021-05-05 | End: 2021-05-05

## 2021-05-05 RX ORDER — HEPARIN SODIUM 1000 [USP'U]/ML
INJECTION, SOLUTION INTRAVENOUS; SUBCUTANEOUS CODE/TRAUMA/SEDATION MEDICATION
Status: COMPLETED | OUTPATIENT
Start: 2021-05-05 | End: 2021-05-05

## 2021-05-05 RX ORDER — LIDOCAINE HYDROCHLORIDE 10 MG/ML
INJECTION, SOLUTION EPIDURAL; INFILTRATION; INTRACAUDAL; PERINEURAL CODE/TRAUMA/SEDATION MEDICATION
Status: COMPLETED | OUTPATIENT
Start: 2021-05-05 | End: 2021-05-05

## 2021-05-05 RX ORDER — ASPIRIN 81 MG/1
TABLET, CHEWABLE ORAL CODE/TRAUMA/SEDATION MEDICATION
Status: COMPLETED | OUTPATIENT
Start: 2021-05-05 | End: 2021-05-05

## 2021-05-05 RX ORDER — MIDAZOLAM HYDROCHLORIDE 2 MG/2ML
INJECTION, SOLUTION INTRAMUSCULAR; INTRAVENOUS CODE/TRAUMA/SEDATION MEDICATION
Status: COMPLETED | OUTPATIENT
Start: 2021-05-05 | End: 2021-05-05

## 2021-05-05 RX ADMIN — PANTOPRAZOLE SODIUM 40 MG: 40 TABLET, DELAYED RELEASE ORAL at 05:32

## 2021-05-05 RX ADMIN — IOHEXOL 70 ML: 350 INJECTION, SOLUTION INTRAVENOUS at 12:47

## 2021-05-05 RX ADMIN — ACETAMINOPHEN 975 MG: 325 TABLET ORAL at 01:52

## 2021-05-05 RX ADMIN — VERAPAMIL HYDROCHLORIDE 2.5 MG: 2.5 INJECTION, SOLUTION INTRAVENOUS at 12:37

## 2021-05-05 RX ADMIN — Medication 200 MCG: at 12:37

## 2021-05-05 RX ADMIN — FAMOTIDINE 20 MG: 10 INJECTION INTRAVENOUS at 04:24

## 2021-05-05 RX ADMIN — ENOXAPARIN SODIUM 40 MG: 40 INJECTION SUBCUTANEOUS at 07:37

## 2021-05-05 RX ADMIN — SODIUM CHLORIDE 50 ML/HR: 0.9 INJECTION, SOLUTION INTRAVENOUS at 13:10

## 2021-05-05 RX ADMIN — METOPROLOL SUCCINATE 25 MG: 25 TABLET, FILM COATED, EXTENDED RELEASE ORAL at 21:06

## 2021-05-05 RX ADMIN — HEPARIN SODIUM 4000 UNITS: 1000 INJECTION INTRAVENOUS; SUBCUTANEOUS at 12:37

## 2021-05-05 RX ADMIN — MIDAZOLAM 1 MG: 1 INJECTION INTRAMUSCULAR; INTRAVENOUS at 12:35

## 2021-05-05 RX ADMIN — NICOTINE 7 MG/24 HR DAILY TRANSDERMAL PATCH 1 PATCH: at 08:00

## 2021-05-05 RX ADMIN — LIDOCAINE HYDROCHLORIDE 2 ML: 10 INJECTION, SOLUTION EPIDURAL; INFILTRATION; INTRACAUDAL; PERINEURAL at 12:33

## 2021-05-05 RX ADMIN — DICYCLOMINE HYDROCHLORIDE 20 MG: 20 TABLET ORAL at 16:50

## 2021-05-05 RX ADMIN — SENNOSIDES 8.6 MG: 8.6 TABLET, FILM COATED ORAL at 21:06

## 2021-05-05 RX ADMIN — DICYCLOMINE HYDROCHLORIDE 20 MG: 20 TABLET ORAL at 14:14

## 2021-05-05 RX ADMIN — FENTANYL CITRATE 50 MCG: 50 INJECTION INTRAMUSCULAR; INTRAVENOUS at 12:27

## 2021-05-05 RX ADMIN — FENTANYL CITRATE 50 MCG: 50 INJECTION INTRAMUSCULAR; INTRAVENOUS at 12:35

## 2021-05-05 RX ADMIN — MIDAZOLAM 2 MG: 1 INJECTION INTRAMUSCULAR; INTRAVENOUS at 12:27

## 2021-05-05 RX ADMIN — METOPROLOL TARTRATE 25 MG: 25 TABLET, FILM COATED ORAL at 09:08

## 2021-05-05 RX ADMIN — MELATONIN TAB 3 MG 3 MG: 3 TAB at 21:06

## 2021-05-05 RX ADMIN — ASPIRIN 81 MG 324 MG: 81 TABLET ORAL at 12:34

## 2021-05-05 RX ADMIN — SODIUM CHLORIDE 50 ML/HR: 0.9 INJECTION, SOLUTION INTRAVENOUS at 05:32

## 2021-05-05 RX ADMIN — ATORVASTATIN CALCIUM 40 MG: 40 TABLET, FILM COATED ORAL at 18:24

## 2021-05-05 NOTE — ASSESSMENT & PLAN NOTE
- 3 episodes of 100 6 T  -denies any URI, or LUTS  -Blood Cx prelim negative  -no leukocytosis  -monitor

## 2021-05-05 NOTE — CASE MANAGEMENT
Met with pt and provided her with 5 Wishes document  Educated pt on each section of the 5 Wishes and let her know that [address] can arrange two witnesses to sign the document once she completes it  Assisted pt with calling 211 ( Homeless and [address] [facility])  Automated system suggested to request a call back since a representative may not be available for several hours  Pt wrote down the contact info ([facility] #[phone]) and she will call from her hospital room phone post her procedure today  Pt aware to dial a 9 to get out on room phone  Pt does not have her own cell phone however her son has a cell phone and can access free internet at the hotel  Pt reported that she applied for housing 4 years ago with Karissa Valerio  Pt has never heard any news of her application status  Pt asked LCSW to call  [employer] [place] [address] ([phone]) to check on housing assist    LCSW placed call and was transferred to speak with [address] [place] [address] or cell #[phone]  Left message on Ashleigh's cell phone  Also spoke with Amaury Bray at [facility] ([phone]); provided pt name/age and homeless situation  Amaury Bray will have a  return this call

## 2021-05-05 NOTE — PLAN OF CARE
Problem: PAIN - ADULT  Goal: Verbalizes/displays adequate comfort level or baseline comfort level  Description: Interventions:  - Encourage patient to monitor pain and request assistance  - Assess pain using appropriate pain scale  - Administer analgesics based on type and severity of pain and evaluate response  - Implement non-pharmacological measures as appropriate and evaluate response  - Consider cultural and social influences on pain and pain management  - Notify physician/advanced practitioner if interventions unsuccessful or patient reports new pain  Outcome: Progressing     Problem: INFECTION - ADULT  Goal: Absence or prevention of progression during hospitalization  Description: INTERVENTIONS:  - Assess and monitor for signs and symptoms of infection  - Monitor lab/diagnostic results  - Monitor all insertion sites, i e  indwelling lines, tubes, and drains  - Monitor endotracheal if appropriate and nasal secretions for changes in amount and color  - [place] appropriate cooling/warming therapies per order  - Administer medications as ordered  - Instruct and encourage patient and family to use good hand hygiene technique  - Identify and instruct in appropriate isolation precautions for identified infection/condition  Outcome: Progressing  Goal: Absence of fever/infection during neutropenic period  Description: INTERVENTIONS:  - Monitor WBC    Outcome: Progressing     Problem: SAFETY ADULT  Goal: Patient will remain free of falls  Description: INTERVENTIONS:  - Assess patient frequently for physical needs  -  Identify cognitive and physical deficits and behaviors that affect risk of falls    -  [place] fall precautions as indicated by assessment   - Educate patient/family on patient safety including physical limitations  - Instruct patient to call for assistance with activity based on assessment  - Modify environment to reduce risk of injury  - Consider OT/PT consult to assist with strengthening/mobility  Outcome: Progressing  Goal: Maintain or return to baseline ADL function  Description: INTERVENTIONS:  -  Assess patient's ability to carry out ADLs; assess patient's baseline for ADL function and identify physical deficits which impact ability to perform ADLs (bathing, care of mouth/teeth, toileting, grooming, dressing, etc )  - Assess/evaluate cause of self-care deficits   - Assess range of motion  - Assess patient's mobility; develop plan if impaired  - Assess patient's need for assistive devices and provide as appropriate  - Encourage maximum independence but intervene and supervise when necessary  - Involve family in performance of ADLs  - Assess for home care needs following discharge   - Consider OT consult to assist with ADL evaluation and planning for discharge  - Provide patient education as appropriate  Outcome: Progressing  Goal: Maintain or return mobility status to optimal level  Description: INTERVENTIONS:  - Assess patient's baseline mobility status (ambulation, transfers, stairs, etc )    - Identify cognitive and physical deficits and behaviors that affect mobility  - Identify mobility aids required to assist with transfers and/or ambulation (gait belt, sit-to-stand, lift, walker, cane, etc )  - [place] fall precautions as indicated by assessment  - Record patient progress and toleration of activity level on Mobility SBAR; progress patient to next Phase/Stage  - Instruct patient to call for assistance with activity based on assessment  - Consider rehabilitation consult to assist with strengthening/weightbearing, etc   Outcome: Progressing     Problem: DISCHARGE PLANNING  Goal: Discharge to home or other facility with appropriate resources  Description: INTERVENTIONS:  - Identify barriers to discharge w/patient and caregiver  - Arrange for needed discharge resources and transportation as appropriate  - Identify discharge learning needs (meds, wound care, etc )  - Arrange for interpretive services to assist at discharge as needed  - Refer to Case Management Department for coordinating discharge planning if the patient needs post-hospital services based on physician/advanced practitioner order or complex needs related to functional status, cognitive ability, or social support system  Outcome: Progressing     Problem: Knowledge Deficit  Goal: Patient/family/caregiver demonstrates understanding of disease process, treatment plan, medications, and discharge instructions  Description: Complete learning assessment and assess knowledge base    Interventions:  - Provide teaching at level of understanding  - Provide teaching via preferred learning methods  Outcome: Progressing

## 2021-05-05 NOTE — PROGRESS NOTES
Cardiology Team Heart Failure Progress Note - Matt Moraes 63 y o  female MRN: [id number]    Unit/Bed#: CW2 210-02 Encounter: [id number]    Subjective:   Patient seen and examined  Patient had episode of chest and abdominal discomfort  EKG was unremarkable as well as vitals besides mildly elevated temp (100 6)  Staff pursued infectious work up  Hospital Course:   Matt Moraes is a 63y o  year old female with a history of uncontrolled hypertension, self reported history of RSD with chronic pain syndrome , tobacco abuse who presented for three week history of RLQ abdominal pain and shortness of breath on exertion  Was noted to have newly diagnosed HFrEF of 32%  Was supposed to undergo LHC however postponed due to MAHENDRA  So far work up has been negative for abdominal pathology on imagings  Has Hep C antibody positive on blood work        Vitals: Blood pressure 143/84, pulse 70, temperature 98 6 °F (37 °C), resp  rate 18, height 5' 4" (1 626 m), weight 68 4 kg (150 lb 12 7 oz), SpO2 99 %  , Body mass index is 25 88 kg/m² ,   Orthostatic Blood Pressures      Most Recent Value   Blood Pressure  143/84 filed at 05/05/2021 0756   Patient Position - Orthostatic VS  Lying filed at 05/05/2021 0107            Intake/Output Summary (Last 24 hours) at 5/5/2021 5442  Last data filed at 5/5/2021 0532  Gross per 24 hour   Intake 1240 ml   Output --   Net 1240 ml         Physical Exam:    GEN: Matt Moraes appears well, alert and oriented x 3, pleasant and cooperative   HEENT:  Normocephalic, atraumatic, anicteric, moist mucous membranes  NECK: No JVD or carotid bruits   HEART: reg rhythm, reg rate, normal S1 and S2, no murmurs, clicks, gallops or rubs   LUNGS: Clear to auscultation bilaterally; no wheezes, rales, or rhonchi; respiration nonlabored   ABDOMEN:  Normoactive bowel sounds, soft, no tenderness, no distention  EXTREMITIES: peripheral pulses palpable; no edema  NEURO: no gross focal findings; cranial nerves grossly intact   SKIN:  Dry, intact, warm to touch      Current Facility-Administered Medications:     acetaminophen (TYLENOL) tablet 975 mg, 975 mg, Oral, Q6H PRN, Miguel Angel Mahoney DO, 975 mg at 05/05/21 0152    albuterol (PROVENTIL HFA,VENTOLIN HFA) inhaler 2 puff, 2 puff, Inhalation, Q4H PRN, Carol Millerde, DO    aspirin chewable tablet 324 mg, 324 mg, Oral, Once, Marko Vick MD    atorvastatin (LIPITOR) tablet 40 mg, 40 mg, Oral, After Reyes Webb MD, 40 mg at 05/04/21 1725    calcium carbonate (TUMS) chewable tablet 1,000 mg, 1,000 mg, Oral, Daily PRN, Carol Taylor, DO    dicyclomine (BENTYL) tablet 20 mg, 20 mg, Oral, TID AC, Imelda Harding DO, 20 mg at 05/04/21 1725    enoxaparin (LOVENOX) subcutaneous injection 40 mg, 40 mg, Subcutaneous, Q24H [address], Lexii Cleary MD, 40 mg at 05/05/21 0737    Labetalol HCl (NORMODYNE) injection 10 mg, 10 mg, Intravenous, Q6H PRN, Imelda Harding DO    melatonin tablet 3 mg, 3 mg, Oral, HS, Tamika Elliott MD, 3 mg at 05/04/21 2148    metoprolol tartrate (LOPRESSOR) tablet 25 mg, 25 mg, Oral, Q12H [address], Ema Chaves MD, 25 mg at 05/04/21 2148    nicotine (NICODERM CQ) 7 mg/24hr TD 24 hr patch 1 patch, 1 patch, Transdermal, Daily, Imelda Harding DO, 1 patch at 05/04/21 0922    pantoprazole (PROTONIX) EC tablet 40 mg, 40 mg, Oral, Early Morning, Lexii Cleary MD, 40 mg at 05/05/21 0532    senna (SENOKOT) tablet 8 6 mg, 1 tablet, Oral, HS, Tamika Elliott MD, 8 6 mg at 05/01/21 2348    simethicone (MYLICON) chewable tablet 80 mg, 80 mg, Oral, Q6H PRN, Tamika Elliott MD, 80 mg at 05/03/21 0205    sodium chloride 0 9 % infusion, 50 mL/hr, Intravenous, Continuous, Wilmar Diallo MD, Last Rate: 50 mL/hr at 05/05/21 0532, 50 mL/hr at 05/05/21 0532    Labs & Results:  Results from last 7 days   Lab Units 04/30/21  1408   TROPONIN I ng/mL 0 03     Results from last 7 days   Lab Units 04/30/21  1408   NT-PRO BNP pg/mL 6,281* Results from last 7 days   Lab Units 05/05/21  0528 05/04/21  0457 05/04/21  0013   POTASSIUM mmol/L 4 1 3 6 3 1*   CO2 mmol/L 26 27 26   CHLORIDE mmol/L 108 106 105   BUN mg/dL 27* 30* 28*   CREATININE mg/dL 1 11 1 24 1 45*     Results from last 7 days   Lab Units 05/05/21  0528 05/04/21  0457 05/03/21  0645   HEMOGLOBIN g/dL 12 7 12 6 12 3   HEMATOCRIT % 38 9 38 5 37 2   PLATELETS Thousands/uL 398* 385 320     Results from last 7 days   Lab Units 04/30/21  1408   TRIGLYCERIDES mg/dL 103   HDL mg/dL 44   LDL CALC mg/dL 134*   HEMOGLOBIN A1C % 5 4       Telemetry:   Personally reviewed by Jonnie Lockhart MD:     VTE Prophylaxis: Heparin        Assessment:  Principal Problem:    New onset of congestive heart failure (HCC)  Active Problems:    Abdominal pain    Hyperlipidemia    Pulmonary nodule    Shortness of breath    Hypertensive urgency    Positive D dimer    Abnormal finding on imaging of liver    Smoker    Hypertension    Chronic hepatitis C virus infection ([facility] [address] )    #Newly diagnosed HFrEF 32%  - elevated BNP 6, 281  - echocardiogram: EF 32% with severe diffuse hypokinesis, wall thickness mildly to moderately increased consistent with eccentric hypertrophy  G2DD  - EKG showed LVH  - consider ICM vs NICM, in the setting of longstanding history of uncontrolled HTN and significant tobacco use  #Longstanding uncontrolled HTN  - presented with SBP >190s, started on Lisinopril 5mg daily and metoprolol tartrate 25mg BID  - BP range 120-130/60-80  #MAHENDRA (resolved)  - Nephrology following  #Tobacco abuse  - has 43 py smoking history  - nicotine patch  #RLQ abdominal pain  - VAS celiac/mesentery: aorta, celiac, splenic, hepatic arteries patent  SMA and LYDIA normal and patent  - US RUQ: Mild hepatomegaly  Stable compared to prior CT  Top normal caliber common hepatic duct without obstructing etiology identified  No filling defects  Common bile duct tapers normally through the pancreatic head   No intrahepatic ductal dilatation  Gallbladder appears within normal limits  - LFTs and t bili within range  #Positive for Hep C antibody  - denies illicit drug use  - on CT scan with noted changes suspicious for early cirrhosis  #History of RDS with chronic pain syndrome      Plan:  - For [facility] for ischemic evaluation  - Will evaluate for LifeVest prior to discharge  - Consider cardiac MRI if cardiac cath negative  - Hold ACEi and Lasix until s/p Cath  - Continue with metoprolol tartrate 25mg BID  - Appreciate Nephrology eval, optimize kidney function prior to cath  - Encouraged tobacco cessation     Case discussed and reviewed with Dr Bossman Servin  Further recommendations to follow  Thank you for involving us in the care of your patient  Imelda Gonsalez MD  Cardiology Fellow PGY-4      [email or web address] Plan/Care Everywhere records reviewed:     ** Please Note: Fluency DirectDictation voice to text software may have been used in the creation of this document   **

## 2021-05-05 NOTE — QUICK NOTE
Notified by RN patient complaining of chest/abdominal pain  EKG ordered and vitals taken which were stable with exception of temp 100 6  EKG reviewed, without significant change at this time  Patient seen and evaluated at bedside  At time of arrival, she was sleeping comfortably in bed  After waking her up, patient reports that she was woken from sleep with very bad epigastric pain  She stated "ya know that pain when you're so hungry, you feel like you haven't eaten anything in your life?" At time of my evaluation, patient was 100% pain free  Patient with no significant findings on physical exam  Unlikely cardiac in nature, but low threshold to order troponin and trial nitro with repeat pain  Will also order one time dose of pepcid, as patient currently NPO for procedure, and could be experiencing dyspepsia 2/2 to this  With ongoing fever over the past few days without source, will order blood culture to r/o systemic infectious process  Patient currently denying all infectious symptoms, and states does not feel feverish or have chills, but fevers have intermittent since 5/1 while on tylenol  Will also check labs early

## 2021-05-05 NOTE — BRIEF OP NOTE (RAD/CATH)
Cardiac catheterization:    Indication:  Dyspnea  Echocardiography reveals global hypokinesis with an ejection fraction of 32%  Rule out ischemic heart disease  Angiography:  The coronary anatomy is a right-dominant system  The left main coronary artery is unobstructed  It bifurcates into left anterior descending left circumflex branch  Left anterior descending artery is a large vessel that reaches the apex  It has mild diffuse atherosclerosis with a 40 to 50% obstruction at the level of the 1st diagonal branch  The circumflex coronary artery is a large vessel giving off a large 1st marginal and a number of posterolateral branches  The right coronary artery contains a 50% stenosis at the level of OM1  OM1 contains a 60% stenosis in its origin  The right coronary artery is a dominant vessel that bifurcates at the crux into a PDA and right posterolateral segment branch  The right coronary artery contains diffuse irregular 60% obstruction throughout its proximal portion  Comment:  The patient has mild-to-moderate 3 vessel epicardial coronary artery disease  These lesions are not consistent with an ischemic cardiomyopathy and suspect they are an incidental finding in the setting of a nonischemic cardiomyopathy  Medical therapy is recommended

## 2021-05-05 NOTE — PROGRESS NOTES
INTERNAL MEDICINE RESIDENCY PROGRESS NOTE     Name: Latoya Nolan   Age & Sex: 63 y o  female   MRN: [id number]  Unit/Bed#: CW2 210-02   Encounter: [id number]  Team: SOD Team A    PATIENT INFORMATION     Name: Latoya Nolan   Age & Sex: 63 y o  female   MRN: [id number]  Hospital Stay Days: 3    ASSESSMENT/PLAN     Principal Problem:    New onset of congestive heart failure ([facility] )  Active Problems:    Abdominal pain    Hypertension    Chronic hepatitis C virus infection ([facility] )    Smoker    Hyperlipidemia    Pulmonary nodule    Shortness of breath    Hypertensive urgency    Positive D dimer    Abnormal finding on imaging of liver    Fever      * New onset of congestive heart failure (HCC)  Assessment & Plan  Wt Readings from Last 3 Encounters:   05/03/21 69 kg (152 lb 1 9 oz)   04/20/21 56 7 kg (125 lb)   03/23/18 56 7 kg (125 lb)     5/3- Echo EF 64% Grade II diastolic Dysfunction  -Diffuse hypokinesis LA dilated, Mild MR & TR  -presented w/ volume overload had a good response w/ lasix 40mg IV  -Started on Lisinopril 5mg and Lipitor 40mg  -will hold onto B-Blocker / Aldactone at this time  -Can consider Bisoprolol if respiratory distress with other B-blocker as it is more cardioselective  -will give another Lasix 20mg IV  -Monitor I/O   -Daily weight checks  -fluctuating Cr from   9 to 1 4 to 1 24  -Nephro consult per Cards   -Cath tomorrow likely   -Hold lisinopril  -Gentle hydration Pre / Post to prevent Contrast Nephropathy  -Pending Cath results    Abdominal pain  Assessment & Plan  Unclear etiology  Does have findings of atherosclerotic abdominal vessels on recent CT; intermittent abdominal pain although not clearly associated with food intake  Possible early cirrhosis on recent CT scan, however no significant ascites or abdominal distention  Tolerating diet well without nausea/vomiting/diarrhea/constipation        Abdominal exam remains benign and pain continues to be episodic/intermittent with no clear cause or temporality  Plan:  · Will continue Bentyl for now, monitor for improvement  · Initiate pantoprazole 40 mg daily p o  Daniel Briseno · Mesenteric duplex completed, results pending official read  · Given potential anginal equivalent, consider trialing sublingual nitroglycerin for further episodes of abdominal pain  · Suspect functional component, can trial TCA as well  -RUQ- No gallbladder findings  No intrahepatic biliary dilatation  CBD measures 6 mm  No choledocholithiasis   -Mild hepatomegaly  -  7 cm in the midclavicular line  Surface contour is smooth  Parenchyma:  Echogenicity and echotexture are within normal limits  No evidence of suspicious mass  Limited imaging of the main portal vein shows it to be patent and hepatopetal     Chronic hepatitis C virus infection (HCC)  Assessment & Plan  -Hep C Reactive antibody   -No hx of IVDU / Transfusion / Have not had a sexual partner for decades    -5/5 Hep C Quantitative 0    Hypertension  Assessment & Plan  See assessment and plan under hypertensive urgency above  Smoker  Assessment & Plan  Admitting lung imaging does show some mild emphysema  Plan  · Tobacco cessation materials on discharge  · Nicotine patch while admitted  · Tobacco cessation strongly encouraged as patient likely has underlying baseline COPD    Fever  Assessment & Plan  - 2 episodes of 100 6 T  -denies any URI, or LUTS  -Blood Cx prelim negative  -no leukocytosis  -monitor     Abnormal finding on imaging of liver  Assessment & Plan  Possible early cirrhosis noted on CT scan obtained on arrival   Chronic hepatitis panel did show high reactivity for hep C antibodies  Plan:  · Will recommend outpatient elastography  · Will check hep C viral RNA    Positive D dimer  Assessment & Plan  Patient with a positive D-dimer from her previous admission at [facility] ED 10 days ago at 2 17   CT PE protocol negative  Patient's D-dimer on admission 3 09    Doubt that her dyspnea is secondary to any new pulmonary embolism  Patient is not tachycardic, no ECG findings suggesting new PE  No history of DVT or PE  Plan:  · Will plan to treat new onset heart failure as above    Hypertensive urgency  Assessment & Plan  Patient with a blood pressure of 198/111 on arrival   Lab work indicating no signs of end-organ damage  Blood pressures are improving on antihypertensive  Plan:  · Labetalol 10 mg as needed every 6 hours for SBP greater than 180  · Continue amlodipine 5 mg daily  · Monitor pressures closely    Shortness of breath  Assessment & Plan  Patient presented with shortness of breath initially concerning for heart failure etiology given radiograph and cardiac serologies, however etiology appears unclear at this time  Shortness of breath seems secondary to bouts of abdominal pain  Additionally, she remains euvolemic on exam today without any signs or symptoms concerning for heart failure  Plan:  · Will continue holding off further diuresis  · TTE obtained yesterday, await official read, however as patient did have profoundly elevated NT proBNP with signs of LVH on presenting EKG, anticipate ejection fraction to be reduced and patient to require ischemic evaluation, either in the inpatient or outpatient setting  · -See CHF    Pulmonary nodule  Assessment & Plan  5 mm pulmonary nodule in the right middle lobe identified on arrival   Patient has a long standing history of smoking, would recommend outpatient follow-up for repeat imaging  Hyperlipidemia  Assessment & Plan  Patient has not seen a physician in years  Last lipid panel in 2015 revealed a total cholesterol 230, triglycerides 163, HDL 43,   Patient is not on any medication as an outpatient  Lipid panel obtained on arrival shows total cholesterol 199, triglycerides 103, HDL 44,        Plan:  · ASCVD risk of 10 8%, can consider statin therapy as an outpatient or inpatient pending further results    MAHENDRA (acute kidney injury) (HCC)-resolved as of 2021  Assessment & Plan  -Cr 1 45 from 0 96 baseline around 1  -likely prerenal in etiology  -stable now Cr 1 24  -avoid Hypotension / NSAIDs      Disposition: Cath pending     SUBJECTIVE     Patient seen and examined  Had fever of 100 6 over night  Denies any fever or chills  Also had epigastric pain which was improved w/ bentyl  Denies any further chest pain, SOB, palpitations, nausea, vomiting, fever / chills  She is having the CATH done today  OBJECTIVE     Vitals:    21 0148 21 0756 21 1230 21 1307   BP:  143/84 139/75 121/80   BP Location:       Pulse: 86 70 80    Resp:  18 18    Temp: 100 2 °F (37 9 °C) 98 6 °F (37 °C)     TempSrc:       SpO2: 94% 99% 94%    Weight:       Height:          Temperature:   Temp (24hrs), Av 5 °F (37 5 °C), Min:98 2 °F (36 8 °C), Max:100 6 °F (38 1 °C)    Temperature: 98 6 °F (37 °C)  Intake & Output:  I/O        0701 -  0700  0701 -  0700    P  O  300 240    I V  (mL/kg)  1000 (14 6)    Total Intake(mL/kg) 300 (4 4) 1240 (18 1)    Net +300 +1240          Unmeasured Urine Occurrence  3 x        Weights:   IBW (Ideal Body Weight): 54 7 kg    Body mass index is 25 88 kg/m²  Weight (last 2 days)     Date/Time   Weight    21 0456   68 4 (150 79)    21 0550   69 (152 12)            Physical Exam  Constitutional:       General: She is not in acute distress  Appearance: She is not ill-appearing, toxic-appearing or diaphoretic  HENT:      Head: Normocephalic and atraumatic  Nose: No congestion or rhinorrhea  Mouth/Throat:      Pharynx: No oropharyngeal exudate or posterior oropharyngeal erythema  Cardiovascular:      Rate and Rhythm: Normal rate and regular rhythm  Pulses: Normal pulses  Heart sounds: Normal heart sounds  No murmur  No friction rub  No gallop  Pulmonary:      Effort: Pulmonary effort is normal  No respiratory distress        Breath sounds: Normal breath sounds  No stridor  No wheezing, rhonchi or rales  Chest:      Chest wall: No tenderness  Abdominal:      General: There is no distension  Palpations: There is no mass  Tenderness: There is no abdominal tenderness  There is no right CVA tenderness, left CVA tenderness, guarding or rebound  Hernia: No hernia is present  Musculoskeletal:      Right lower leg: No edema  Left lower leg: No edema  Neurological:      General: No focal deficit present  Mental Status: She is alert and oriented to person, place, and time  Psychiatric:         Mood and Affect: Mood normal          Behavior: Behavior normal        LABORATORY DATA     Labs: I have personally reviewed pertinent reports  Results from last 7 days   Lab Units 05/05/21 0528 05/04/21 0457 05/03/21  0645   WBC Thousand/uL 9 44 8 04 9 89   HEMOGLOBIN g/dL 12 7 12 6 12 3   HEMATOCRIT % 38 9 38 5 37 2   PLATELETS Thousands/uL 398* 385 320   NEUTROS PCT % 64 60 67   MONOS PCT % 13* 16* 13*      Results from last 7 days   Lab Units 05/05/21 0528 05/04/21 0457 05/04/21  0013  05/02/21  0451  04/30/21  1408   POTASSIUM mmol/L 4 1 3 6 3 1*   < > 3 5   < > 3 4*   CHLORIDE mmol/L 108 106 105   < > 104   < > 111*   CO2 mmol/L 26 27 26   < > 30   < > 24   BUN mg/dL 27* 30* 28*   < > 24   < > 15   CREATININE mg/dL 1 11 1 24 1 45*   < > 1 13   < > 0 99   CALCIUM mg/dL 9 9 9 5 9 5   < > 9 3   < > 9 4   ALK PHOS U/L  --   --   --   --  117*  --  125*   ALT U/L  --   --   --   --  38  --  37   AST U/L  --   --   --   --  21  --  25    < > = values in this interval not displayed       Results from last 7 days   Lab Units 05/02/21 0451 05/01/21  0604 04/30/21  1408   MAGNESIUM mg/dL 2 3 2 3 2 3     Results from last 7 days   Lab Units 05/01/21  0604   PHOSPHORUS mg/dL 3 7          Results from last 7 days   Lab Units 05/04/21  0457   LACTIC ACID mmol/L 1 7     Results from last 7 days   Lab Units 04/30/21  1408   TROPONIN I ng/mL 0 03       IMAGING & DIAGNOSTIC TESTING     Radiology Results: I have personally reviewed pertinent reports  Xr Chest 1 View Portable    Result Date: 4/30/2021  Impression: Cardiomegaly with vascular congestion likely on the basis of CHF  Workstation performed: [id number]     Us Right Upper Quadrant    Result Date: 5/4/2021  Impression: Mild hepatomegaly  Stable compared to prior CT  Top normal caliber common hepatic duct without obstructing etiology identified  No filling defects  Common bile duct tapers normally through the pancreatic head  No intrahepatic ductal dilatation  Gallbladder appears within normal limits  Workstation performed: [id number]     Other Diagnostic Testing: I have personally reviewed pertinent reports      ACTIVE MEDICATIONS     Current Facility-Administered Medications   Medication Dose Route Frequency    acetaminophen (TYLENOL) tablet 650 mg  650 mg Oral Q4H PRN    acetaminophen (TYLENOL) tablet 975 mg  975 mg Oral Q6H PRN    albuterol (PROVENTIL HFA,VENTOLIN HFA) inhaler 2 puff  2 puff Inhalation Q4H PRN    aspirin chewable tablet 324 mg  324 mg Oral Once    atorvastatin (LIPITOR) tablet 40 mg  40 mg Oral After Dinner    calcium carbonate (TUMS) chewable tablet 1,000 mg  1,000 mg Oral Daily PRN    dicyclomine (BENTYL) tablet 20 mg  20 mg Oral TID AC    enoxaparin (LOVENOX) subcutaneous injection 40 mg  40 mg Subcutaneous Q24H WILDER    Labetalol HCl (NORMODYNE) injection 10 mg  10 mg Intravenous Q6H PRN    melatonin tablet 3 mg  3 mg Oral HS    metoprolol tartrate (LOPRESSOR) tablet 25 mg  25 mg Oral Q12H [address]    nicotine (NICODERM CQ) 7 mg/24hr TD 24 hr patch 1 patch  1 patch Transdermal Daily    ondansetron (ZOFRAN) injection 4 mg  4 mg Intravenous Q6H PRN    pantoprazole (PROTONIX) EC tablet 40 mg  40 mg Oral Early Morning    senna (SENOKOT) tablet 8 6 mg  1 tablet Oral HS    simethicone (MYLICON) chewable tablet 80 mg  80 mg Oral Q6H PRN    sodium chloride 0 9 % infusion  50 mL/hr Intravenous Continuous       VTE Pharmacologic Prophylaxis: Enoxaparin (Lovenox)  VTE Mechanical Prophylaxis: sequential compression device    Portions of the record may have been created with voice recognition software  Occasional wrong word or "sound a like" substitutions may have occurred due to the inherent limitations of voice recognition software    Read the chart carefully and recognize, using context, where substitutions have occurred   ==  Ralph Gutiérrez MD  [address]  Internal Medicine Residency PGY-1

## 2021-05-05 NOTE — PROGRESS NOTES
NEPHROLOGY PROGRESS NOTE   Lenny Mcnamara 63 y o  female MRN: [id number]  Unit/Bed#: 2 210-02 Encounter: [id number]      ASSESSMENT/PLAN:  1  Mild MAHENDRA: baseline creatinine around 0 9-1  Creatinine increased to 1 4 this admission likely related to IV Lasix,  Starting lisinopril, Toradol, relative hypotension  Creatinine today improved to 1 1 today   · UA:  Trace leukocytes, +1 protein  · abdominal CT:  Benign renal cysts, small calcification in left renal hilum  · Diuretics and lisinopril currently on hold for cardiac cath today   · Avoid further Toradol or other NSAIDs  · Currently on saline at 50cc/h in prep for cardiac cath   · Discussed risk of contrast nephropathy   · Check am BMP   2  New CHF with EF 32%: getting cath today for workup   3  Hypertension: /100s on admission and was started on lisinopril, metoprolol and IV lasix   · Would avoid relative hypotension   4  Tobacco Abuse   5  Abdominal pain: work up per primary team     Plan Summary:    Continue saline at 50cc/h for 4-6 hours post cardiac cath    Check am BMP    Continue to hold lisinopril and diuretics for now     SUBJECTIVE:  Feeling ok today and awaiting cardiac cath  No current chest pain, shortness of breath or abdominal pain       OBJECTIVE:  Current Weight: Weight - Scale: 68 4 kg (150 lb 12 7 oz)  Vitals:    05/05/21 0756   BP: 143/84   Pulse: 70   Resp: 18   Temp: 98 6 °F (37 °C)   SpO2: 99%       Intake/Output Summary (Last 24 hours) at 5/5/2021 1205  Last data filed at 5/5/2021 0900  Gross per 24 hour   Intake 1240 ml   Output --   Net 1240 ml       General:  No acute distress  Skin:  No rash  Eyes:  Sclerae anicteric  ENT:  Moist mucous membranes  Neck:  Trachea midline with no JVD  Chest:  Clear to auscultation bilaterally  CVS:  Regular rate and rhythm  Abdomen:  Soft, nontender, nondistended  Extremities:  No edema  Neuro:  Awake and alert  Psych:  Appropriate affect        Medications:  Scheduled Meds:  Current Facility-Administered Medications   Medication Dose Route Frequency Provider Last Rate    acetaminophen  975 mg Oral Q6H PRN Jessica Villa, DO      albuterol  2 puff Inhalation Q4H PRN Nirmal Salinas, DO      aspirin  324 mg Oral Once Wilner Delacruz MD      atorvastatin  40 mg Oral After Aramis Terrazas MD      calcium carbonate  1,000 mg Oral Daily PRN Nirmal Salinas, DO      dicyclomine  20 mg Oral TID AC Imelda Harding, DO      enoxaparin  40 mg Subcutaneous Q24H [facility] Liane Maya MD      Labetalol HCl  10 mg Intravenous Q6H PRN Nirmal Salinas, DO      melatonin  3 mg Oral HS Dontae Peterson MD      metoprolol tartrate  25 mg Oral Q12H [facility] Ashleigh Nina MD      nicotine  1 patch Transdermal Daily Imeldajuan manuel Harding, DO      pantoprazole  40 mg Oral Early Morning Liane Maya MD      senna  1 tablet Oral HS Dontae Peterson MD      simethicone  80 mg Oral Q6H PRN Dontae Peterson MD         PRN Meds:   acetaminophen    albuterol    calcium carbonate    Labetalol HCl    simethicone    Continuous Infusions:     Laboratory Results:  Results from last 7 days   Lab Units 05/05/21  0528 05/04/21  0457 05/04/21  0013 05/03/21  0645 05/03/21  0520 05/02/21  0451 05/01/21  0604 04/30/21  1408   WBC Thousand/uL 9 44 8 04  --  9 89  --  9 16 8 00 8 58   HEMOGLOBIN g/dL 12 7 12 6  --  12 3  --  12 8 12 3 12 4   HEMATOCRIT % 38 9 38 5  --  37 2  --  38 0 37 1 37 6   PLATELETS Thousands/uL 398* 385  --  320  --  324 300 288   SODIUM mmol/L 139 140 138  --  139 138 140 141   POTASSIUM mmol/L 4 1 3 6 3 1*  --  3 6 3 5 3 8 3 4*   CHLORIDE mmol/L 108 106 105  --  108 104 108 111*   CO2 mmol/L 26 27 26  --  25 30 25 24   BUN mg/dL 27* 30* 28*  --  18 24 21 15   CREATININE mg/dL 1 11 1 24 1 45*  --  0 96 1 13 1 13 0 99   CALCIUM mg/dL 9 9 9 5 9 5  --  9 2 9 3 9 3 9 4   MAGNESIUM mg/dL  --   --   --   --   --  2 3 2 3 2 3   PHOSPHORUS mg/dL  --   --   --   --   --   --  3 7  --

## 2021-05-05 NOTE — PROGRESS NOTES
Patient c/o chest and abdominal pain this morning  An EKG was done and sent to [facility] from [facility]  Vitals were taken, 132/86, 85, 20, 97% room air, 100 6 oral   Tylenol was given for fever and pain  Jamilah is looking at new EKG and comparing with earlier ones  She will advise  Patient resting in bed, will continue to monitor

## 2021-05-06 ENCOUNTER — TELEPHONE (OUTPATIENT)
Dept: RADIOLOGY | Facility: HOSPITAL | Age: 61
End: 2021-05-06

## 2021-05-06 ENCOUNTER — APPOINTMENT (INPATIENT)
Dept: RADIOLOGY | Facility: HOSPITAL | Age: 61
DRG: 469 | End: 2021-05-06
Payer: COMMERCIAL

## 2021-05-06 PROBLEM — B18.2 CHRONIC HEPATITIS C VIRUS INFECTION (HCC): Status: RESOLVED | Noted: 2021-05-04 | Resolved: 2021-05-06

## 2021-05-06 LAB
ANION GAP SERPL CALCULATED.3IONS-SCNC: 4 MMOL/L (ref 4–13)
BASOPHILS # BLD AUTO: 0.07 THOUSANDS/ΜL (ref 0–0.1)
BASOPHILS NFR BLD AUTO: 1 % (ref 0–1)
BUN SERPL-MCNC: 23 MG/DL (ref 5–25)
CALCIUM SERPL-MCNC: 9.3 MG/DL (ref 8.3–10.1)
CHLORIDE SERPL-SCNC: 107 MMOL/L (ref 100–108)
CO2 SERPL-SCNC: 28 MMOL/L (ref 21–32)
CREAT SERPL-MCNC: 1.04 MG/DL (ref 0.6–1.3)
EOSINOPHIL # BLD AUTO: 0.19 THOUSAND/ΜL (ref 0–0.61)
EOSINOPHIL NFR BLD AUTO: 2 % (ref 0–6)
ERYTHROCYTE [DISTWIDTH] IN BLOOD BY AUTOMATED COUNT: 12.9 % (ref 11.6–15.1)
GFR SERPL CREATININE-BSD FRML MDRD: 59 ML/MIN/1.73SQ M
GLUCOSE SERPL-MCNC: 96 MG/DL (ref 65–140)
HCT VFR BLD AUTO: 39 % (ref 34.8–46.1)
HGB BLD-MCNC: 12.6 G/DL (ref 11.5–15.4)
IMM GRANULOCYTES # BLD AUTO: 0.07 THOUSAND/UL (ref 0–0.2)
IMM GRANULOCYTES NFR BLD AUTO: 1 % (ref 0–2)
LYMPHOCYTES # BLD AUTO: 1.74 THOUSANDS/ΜL (ref 0.6–4.47)
LYMPHOCYTES NFR BLD AUTO: 22 % (ref 14–44)
MCH RBC QN AUTO: 30.1 PG (ref 26.8–34.3)
MCHC RBC AUTO-ENTMCNC: 32.3 G/DL (ref 31.4–37.4)
MCV RBC AUTO: 93 FL (ref 82–98)
MONOCYTES # BLD AUTO: 1.2 THOUSAND/ΜL (ref 0.17–1.22)
MONOCYTES NFR BLD AUTO: 15 % (ref 4–12)
NEUTROPHILS # BLD AUTO: 4.59 THOUSANDS/ΜL (ref 1.85–7.62)
NEUTS SEG NFR BLD AUTO: 59 % (ref 43–75)
NRBC BLD AUTO-RTO: 0 /100 WBCS
PLATELET # BLD AUTO: 365 THOUSANDS/UL (ref 149–390)
PMV BLD AUTO: 11.1 FL (ref 8.9–12.7)
POTASSIUM SERPL-SCNC: 3.9 MMOL/L (ref 3.5–5.3)
RBC # BLD AUTO: 4.19 MILLION/UL (ref 3.81–5.12)
SODIUM SERPL-SCNC: 139 MMOL/L (ref 136–145)
WBC # BLD AUTO: 7.86 THOUSAND/UL (ref 4.31–10.16)

## 2021-05-06 PROCEDURE — 99232 SBSQ HOSP IP/OBS MODERATE 35: CPT | Performed by: INTERNAL MEDICINE

## 2021-05-06 PROCEDURE — 99231 SBSQ HOSP IP/OBS SF/LOW 25: CPT | Performed by: INTERNAL MEDICINE

## 2021-05-06 PROCEDURE — A9585 GADOBUTROL INJECTION: HCPCS | Performed by: STUDENT IN AN ORGANIZED HEALTH CARE EDUCATION/TRAINING PROGRAM

## 2021-05-06 PROCEDURE — 71045 X-RAY EXAM CHEST 1 VIEW: CPT

## 2021-05-06 PROCEDURE — 75561 CARDIAC MRI FOR MORPH W/DYE: CPT

## 2021-05-06 PROCEDURE — 85025 COMPLETE CBC W/AUTO DIFF WBC: CPT | Performed by: STUDENT IN AN ORGANIZED HEALTH CARE EDUCATION/TRAINING PROGRAM

## 2021-05-06 PROCEDURE — 80048 BASIC METABOLIC PNL TOTAL CA: CPT | Performed by: INTERNAL MEDICINE

## 2021-05-06 RX ORDER — POTASSIUM CHLORIDE 20 MEQ/1
40 TABLET, EXTENDED RELEASE ORAL ONCE
Status: COMPLETED | OUTPATIENT
Start: 2021-05-06 | End: 2021-05-06

## 2021-05-06 RX ADMIN — GADOBUTROL 14 ML: 604.72 INJECTION INTRAVENOUS at 11:16

## 2021-05-06 RX ADMIN — ACETAMINOPHEN 975 MG: 325 TABLET ORAL at 18:45

## 2021-05-06 RX ADMIN — NICOTINE 7 MG/24 HR DAILY TRANSDERMAL PATCH 1 PATCH: at 09:14

## 2021-05-06 RX ADMIN — PANTOPRAZOLE SODIUM 40 MG: 40 TABLET, DELAYED RELEASE ORAL at 05:34

## 2021-05-06 RX ADMIN — POTASSIUM CHLORIDE 40 MEQ: 1500 TABLET, EXTENDED RELEASE ORAL at 09:14

## 2021-05-06 RX ADMIN — METOPROLOL SUCCINATE 25 MG: 25 TABLET, FILM COATED, EXTENDED RELEASE ORAL at 09:14

## 2021-05-06 RX ADMIN — ACETAMINOPHEN 975 MG: 325 TABLET ORAL at 00:15

## 2021-05-06 RX ADMIN — DICYCLOMINE HYDROCHLORIDE 20 MG: 20 TABLET ORAL at 11:53

## 2021-05-06 RX ADMIN — ATORVASTATIN CALCIUM 40 MG: 40 TABLET, FILM COATED ORAL at 17:24

## 2021-05-06 RX ADMIN — DICYCLOMINE HYDROCHLORIDE 20 MG: 20 TABLET ORAL at 06:14

## 2021-05-06 RX ADMIN — METOPROLOL SUCCINATE 25 MG: 25 TABLET, FILM COATED, EXTENDED RELEASE ORAL at 21:21

## 2021-05-06 RX ADMIN — MELATONIN TAB 3 MG 3 MG: 3 TAB at 21:21

## 2021-05-06 RX ADMIN — ENOXAPARIN SODIUM 40 MG: 40 INJECTION SUBCUTANEOUS at 09:15

## 2021-05-06 RX ADMIN — DICYCLOMINE HYDROCHLORIDE 20 MG: 20 TABLET ORAL at 17:24

## 2021-05-06 NOTE — PLAN OF CARE
Problem: PAIN - ADULT  Goal: Verbalizes/displays adequate comfort level or baseline comfort level  Description: Interventions:  - Encourage patient to monitor pain and request assistance  - Assess pain using appropriate pain scale  - Administer analgesics based on type and severity of pain and evaluate response  - Implement non-pharmacological measures as appropriate and evaluate response  - Consider cultural and social influences on pain and pain management  - Notify physician/advanced practitioner if interventions unsuccessful or patient reports new pain  Outcome: Progressing     Problem: INFECTION - ADULT  Goal: Absence or prevention of progression during hospitalization  Description: INTERVENTIONS:  - Assess and monitor for signs and symptoms of infection  - Monitor lab/diagnostic results  - Monitor all insertion sites, i e  indwelling lines, tubes, and drains  - Monitor endotracheal if appropriate and nasal secretions for changes in amount and color  - [place] appropriate cooling/warming therapies per order  - Administer medications as ordered  - Instruct and encourage patient and family to use good hand hygiene technique  - Identify and instruct in appropriate isolation precautions for identified infection/condition  Outcome: Progressing  Goal: Absence of fever/infection during neutropenic period  Description: INTERVENTIONS:  - Monitor WBC    Outcome: Progressing     Problem: SAFETY ADULT  Goal: Patient will remain free of falls  Description: INTERVENTIONS:  - Assess patient frequently for physical needs  -  Identify cognitive and physical deficits and behaviors that affect risk of falls    -  [place] fall precautions as indicated by assessment   - Educate patient/family on patient safety including physical limitations  - Instruct patient to call for assistance with activity based on assessment  - Modify environment to reduce risk of injury  - Consider OT/PT consult to assist with strengthening/mobility  Outcome: Progressing  Goal: Maintain or return to baseline ADL function  Description: INTERVENTIONS:  -  Assess patient's ability to carry out ADLs; assess patient's baseline for ADL function and identify physical deficits which impact ability to perform ADLs (bathing, care of mouth/teeth, toileting, grooming, dressing, etc )  - Assess/evaluate cause of self-care deficits   - Assess range of motion  - Assess patient's mobility; develop plan if impaired  - Assess patient's need for assistive devices and provide as appropriate  - Encourage maximum independence but intervene and supervise when necessary  - Involve family in performance of ADLs  - Assess for home care needs following discharge   - Consider OT consult to assist with ADL evaluation and planning for discharge  - Provide patient education as appropriate  Outcome: Progressing  Goal: Maintain or return mobility status to optimal level  Description: INTERVENTIONS:  - Assess patient's baseline mobility status (ambulation, transfers, stairs, etc )    - Identify cognitive and physical deficits and behaviors that affect mobility  - Identify mobility aids required to assist with transfers and/or ambulation (gait belt, sit-to-stand, lift, walker, cane, etc )  - [place] fall precautions as indicated by assessment  - Record patient progress and toleration of activity level on Mobility SBAR; progress patient to next Phase/Stage  - Instruct patient to call for assistance with activity based on assessment  - Consider rehabilitation consult to assist with strengthening/weightbearing, etc   Outcome: Progressing     Problem: DISCHARGE PLANNING  Goal: Discharge to home or other facility with appropriate resources  Description: INTERVENTIONS:  - Identify barriers to discharge w/patient and caregiver  - Arrange for needed discharge resources and transportation as appropriate  - Identify discharge learning needs (meds, wound care, etc )  - Arrange for interpretive services to assist at discharge as needed  - Refer to Case Management Department for coordinating discharge planning if the patient needs post-hospital services based on physician/advanced practitioner order or complex needs related to functional status, cognitive ability, or social support system  Outcome: Progressing     Problem: Knowledge Deficit  Goal: Patient/family/caregiver demonstrates understanding of disease process, treatment plan, medications, and discharge instructions  Description: Complete learning assessment and assess knowledge base    Interventions:  - Provide teaching at level of understanding  - Provide teaching via preferred learning methods  Outcome: Progressing

## 2021-05-06 NOTE — CASE MANAGEMENT
Late afternoon yesterday, Ameena Bob from [address] and Referral ([phone]) reported that warming shelters closed the end of April  Ameena Bob suggested checking with [facility] for women however pt doesn't want a shelter and wants her son to reside with her  Ameena Bob stated pt could apply for [employer] housing  Printed [address] List from [facility] [address] with the following [address] [address] for individuals age 54 and above: [facility] ([phone]) 2-4 yr wait list    [facility] ([phone]) 2-3 yr wait list    [facility] ([phone]) 1 1/2 yr wait list    Met with pt today during F rounds and explained the above info  Pt can complete applications for each of these subsidized senior housing units  Provided pt with appl for [facility]  Addresses and phone contacts for senior subsidized high rises in [facility] are listed on the printout provided to patient in addition to Section 8 Housing  Wait list is several years for all of these and pt is aware  Pt was also given a print out of [employer] Stay [employer] and [address] for Rent since she does not want to stay in a shelter  Updated Inpt VAZQUEZ CHAUDHARY who is coordinating [employer] for pt

## 2021-05-06 NOTE — PROGRESS NOTES
NEPHROLOGY PROGRESS NOTE   Grabiel Wayne 63 y o  female MRN: [id number]  Unit/Bed#: [facility] 210-02 Encounter: [id number]      ASSESSMENT & PLAN:  1  Mild acute kidney injury, baseline creatinine 0 9-1, creatinine increased to 1 4 in the setting of diuretics, Ace inhibitors, Toradol dose, relative hypotension  Renal function improved, creatinine down to 1 0 today  Status post cardiac catheterization yesterday with 70 cc of contrast given  Follow daily labs  Keep holding Ace inhibitor for now  2  Newly diagnosed heart failure with reduced EF with an EF of 32%, status post cardiac catheterization yesterday showing 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, medical therapy recommended by Cardiology  3  Uncontrolled hypertension, blood pressure significant improving, on metoprolol, lisinopril can be resumed before discharge    4   Tobacco abuse, smoking sensation per primary team    My plan and recommendations were discussed with Internal Medicine team      SUBJECTIVE:  Patient seen and examined, denies any complaints, no chest pain, shortness of breath, abdominal pain, no nausea, no vomiting    OBJECTIVE:  Current Weight: Weight - Scale: 68 9 kg (151 lb 12 8 oz)  Vitals:    05/06/21 0647   BP: 134/85   Pulse: 65   Resp: 16   Temp: 98 3 °F (36 8 °C)   SpO2: 96%       Intake/Output Summary (Last 24 hours) at 5/6/2021 0849  Last data filed at 5/6/2021 0830  Gross per 24 hour   Intake 944 33 ml   Output 175 ml   Net 769 33 ml     General: conscious, cooperative, in not acute distress  Eyes: conjunctivae pink, anicteric sclerae  ENT: lips and mucous membranes moist, on room air  Neck: supple, no JVD  Chest: clear breath sounds bilateral, no crackles, ronchus or wheezings  CVS: distinct S1 & S2, normal rate, regular rhythm  Abdomen: soft, non-tender, non-distended, normoactive bowel sounds  Extremities: no significant edema of both legs  Skin: no rash  Neuro: awake, alert, oriented        Medications:    Current Facility-Administered Medications:     acetaminophen (TYLENOL) tablet 650 mg, 650 mg, Oral, Q4H PRN, Sunny Vazquez MD    acetaminophen (TYLENOL) tablet 975 mg, 975 mg, Oral, Q6H PRN, Nalini Cano, DO, 975 mg at 05/06/21 0015    albuterol (PROVENTIL HFA,VENTOLIN HFA) inhaler 2 puff, 2 puff, Inhalation, Q4H PRN, Rolin Pee, DO    atorvastatin (LIPITOR) tablet 40 mg, 40 mg, Oral, After Aline Eng MD, 40 mg at 05/05/21 1824    calcium carbonate (TUMS) chewable tablet 1,000 mg, 1,000 mg, Oral, Daily PRN, Rolin Pee, DO    dicyclomine (BENTYL) tablet 20 mg, 20 mg, Oral, TID AC, Imelda Harding DO, 20 mg at 05/06/21 0614    enoxaparin (LOVENOX) subcutaneous injection 40 mg, 40 mg, Subcutaneous, Q24H [address], Sudheer Gallagher MD, 40 mg at 05/05/21 0737    Labetalol HCl (NORMODYNE) injection 10 mg, 10 mg, Intravenous, Q6H PRN, Rolin Pee, DO    melatonin tablet 3 mg, 3 mg, Oral, HS, Bonnie Huber MD, 3 mg at 05/05/21 2106    metoprolol succinate (TOPROL-XL) 24 hr tablet 25 mg, 25 mg, Oral, BID, Marley Corona MD, 25 mg at 05/05/21 2106    nicotine (NICODERM CQ) 7 mg/24hr TD 24 hr patch 1 patch, 1 patch, Transdermal, Daily, Imelda Harding DO, 1 patch at 05/05/21 0800    ondansetron (ZOFRAN) injection 4 mg, 4 mg, Intravenous, Q6H PRN, Sunny Vazquez MD    pantoprazole (PROTONIX) EC tablet 40 mg, 40 mg, Oral, Early Morning, Sudheer Gallagher MD, 40 mg at 05/06/21 0534    potassium chloride (K-DUR,KLOR-CON) CR tablet 40 mEq, 40 mEq, Oral, Once, Glennda Ip, MD    senna (SENOKOT) tablet 8 6 mg, 1 tablet, Oral, HS, Bonnie Huber MD, 8 6 mg at 05/05/21 2106    simethicone (MYLICON) chewable tablet 80 mg, 80 mg, Oral, Q6H PRN, Bonnie Huber MD, 80 mg at 05/03/21 0205    Invasive Devices:        Lab Results:   Results from last 7 days   Lab Units 05/06/21  0534 05/05/21  0528 05/04/21  0457  05/02/21  0451 05/01/21  0604 04/30/21  1408   WBC Thousand/uL 7 86 9 44 8 04   < > 9 16 8 00 8 58   HEMOGLOBIN g/dL 12 6 12 7 12 6   < > 12 8 12 3 12 4   HEMATOCRIT % 39 0 38 9 38 5   < > 38 0 37 1 37 6   PLATELETS Thousands/uL 365 398* 385   < > 324 300 288   SODIUM mmol/L 139 139 140   < > 138 140 141   POTASSIUM mmol/L 3 9 4 1 3 6   < > 3 5 3 8 3 4*   CHLORIDE mmol/L 107 108 106   < > 104 108 111*   CO2 mmol/L 28 26 27   < > 30 25 24   BUN mg/dL 23 27* 30*   < > 24 21 15   CREATININE mg/dL 1 04 1 11 1 24   < > 1 13 1 13 0 99   CALCIUM mg/dL 9 3 9 9 9 5   < > 9 3 9 3 9 4   MAGNESIUM mg/dL  --   --   --   --  2 3 2 3 2 3   PHOSPHORUS mg/dL  --   --   --   --   --  3 7  --    ALK PHOS U/L  --   --   --   --  117*  --  125*   ALT U/L  --   --   --   --  38  --  37   AST U/L  --   --   --   --  21  --  25    < > = values in this interval not displayed  Previous work up:  See previous notes      Portions of the record may have been created with voice recognition software  Occasional wrong word or "sound a like" substitutions may have occurred due to the inherent limitations of voice recognition software  Read the chart carefully and recognize, using context, where substitutions have occurred  If you have any questions, please contact the dictating provider

## 2021-05-06 NOTE — ASSESSMENT & PLAN NOTE
Wt Readings from Last 3 Encounters:   05/06/21 68 9 kg (151 lb 12 8 oz)   05/06/21 68 8 kg (151 lb 12 oz)   04/20/21 56 7 kg (125 lb)     05/03/21 69 kg (152 lb 1 9 oz)   04/20/21 56 7 kg (125 lb)   03/23/18 56 7 kg (125 lb)      5/3- Echo EF 38% Grade II diastolic Dysfunction  -Diffuse hypokinesis LA dilated, Mild MR & TR  -presented w/ volume overload had a good response w/ lasix 40mg IV  -Started on Lisinopril 5mg and Lipitor 40mg  -will hold onto B-Blocker / Aldactone at this time  -Can consider Bisoprolol if respiratory distress with other B-blocker as it is more cardioselective  -will give another Lasix 20mg IV  -Monitor I/O   -Daily weight checks  -fluctuating Cr from   9 to 1 4 to 1 24  -Nephro consult per Cards   -Cath tomorrow likely   -Hold lisinopril  -Gentle hydration Pre / Post to prevent Contrast Nephropathy  -Cath triple vessel disease non-ischemic cardiomyopathy- Recommend Medical Management  -Life Vest  Pending MRI-

## 2021-05-06 NOTE — PROGRESS NOTES
INTERNAL MEDICINE RESIDENCY PROGRESS NOTE     Name: Deon Traylor   Age & Sex: 63 y o  female   MRN: [id number]  Unit/Bed#: CW2 210-02   Encounter: [id number]  Team: SOD Team A    PATIENT INFORMATION     Name: Deon Traylor   Age & Sex: 63 y o  female   MRN: [id number]  Hospital Stay Days: 4    ASSESSMENT/PLAN     Principal Problem:    New onset of congestive heart failure ([facility] )  Active Problems:    Shortness of breath    Hypertensive urgency    Abdominal pain    Hyperlipidemia    Pulmonary nodule    Positive D dimer    Abnormal finding on imaging of liver    Smoker    Hypertension    Fever      Shortness of breath  Assessment & Plan  Patient presented with shortness of breath initially concerning for heart failure etiology given radiograph and cardiac serologies, however etiology appears unclear at this time  Shortness of breath seems secondary to bouts of abdominal pain    Additionally, she remains euvolemic on exam today without any signs or symptoms concerning for heart failure      Plan:  · Will continue holding off further diuresis  · TTE obtained yesterday, await official read, however as patient did have profoundly elevated NT proBNP with signs of LVH on presenting EKG, anticipate ejection fraction to be reduced and patient to require ischemic evaluation, either in the inpatient or outpatient setting  · -See CHF    * New onset of congestive heart failure ([place] [address] )  Assessment & Plan  Wt Readings from Last 3 Encounters:   05/06/21 68 9 kg (151 lb 12 8 oz)   05/06/21 68 8 kg (151 lb 12 oz)   04/20/21 56 7 kg (125 lb)     05/03/21 69 kg (152 lb 1 9 oz)   04/20/21 56 7 kg (125 lb)   03/23/18 56 7 kg (125 lb)      5/3- Echo EF 75% Grade II diastolic Dysfunction  -Diffuse hypokinesis LA dilated, Mild MR & TR  -presented w/ volume overload had a good response w/ lasix 40mg IV  -Started on Lisinopril 5mg and Lipitor 40mg  -will hold onto B-Blocker / Aldactone at this time  -Can consider Bisoprolol if respiratory distress with other B-blocker as it is more cardioselective  -will give another Lasix 20mg IV  -Monitor I/O   -Daily weight checks  -fluctuating Cr from   9 to 1 4 to 1 24  -Nephro consult per Cards   -Cath tomorrow likely   -Hold lisinopril  -Gentle hydration Pre / Post to prevent Contrast Nephropathy  -Cath triple vessel disease non-ischemic cardiomyopathy- Recommend Medical Management  -Life Vest  Pending MRI-           Hypertensive urgency  Assessment & Plan  Patient with a blood pressure of 198/111 on arrival   Lab work indicating no signs of end-organ damage  Blood pressures are improving on antihypertensive      Plan:  · Labetalol 10 mg as needed every 6 hours for SBP greater than 180  · Continue amlodipine 5 mg daily  · Monitor pressures closely    Fever  Assessment & Plan  - 3 episodes of 100 6 T  -denies any URI, or LUTS  -Blood Cx negative  -no leukocytosis  -monitor     Hypertension  Assessment & Plan  See assessment and plan under hypertensive urgency above  Smoker  Assessment & Plan  Admitting lung imaging does show some mild emphysema  Plan  · Tobacco cessation materials on discharge  · Nicotine patch while admitted  · Tobacco cessation strongly encouraged as patient likely has underlying baseline COPD    Abnormal finding on imaging of liver  Assessment & Plan  Possible early cirrhosis noted on CT scan obtained on arrival   Chronic hepatitis panel did show high reactivity for hep C antibodies  Plan:  · Will recommend outpatient elastography  · Will check hep C viral RNA    Pulmonary nodule  Assessment & Plan  Patient has not seen a physician in years  Last lipid panel in 2015 revealed a total cholesterol 230, triglycerides 163, HDL 43,   Patient is not on any medication as an outpatient    Lipid panel obtained on arrival shows total cholesterol 199, triglycerides 103, HDL 44,        Plan:  · ASCVD risk of 10 8%, can consider statin therapy as an outpatient or inpatient pending further results    Hyperlipidemia  Assessment & Plan  Patient has not seen a physician in years  Last lipid panel in 2015 revealed a total cholesterol 230, triglycerides 163, HDL 43,   Patient is not on any medication as an outpatient  Lipid panel obtained on arrival shows total cholesterol 199, triglycerides 103, HDL 44,        Plan:  · ASCVD risk of 10 8%, can consider statin therapy as an outpatient or inpatient pending further results    Abdominal pain  Assessment & Plan  Unclear etiology   Does have findings of atherosclerotic abdominal vessels on recent CT; intermittent abdominal pain although not clearly associated with food intake   Possible early cirrhosis on recent CT scan, however no significant ascites or abdominal distention   Tolerating diet well without nausea/vomiting/diarrhea/constipation        Abdominal exam remains benign and pain continues to be episodic/intermittent with no clear cause or temporality  Plan:  · Will continue Bentyl for now, monitor for improvement  · Initiate pantoprazole 40 mg daily p o  [place] · Mesenteric duplex completed, results pending official read  · Given potential anginal equivalent, consider trialing sublingual nitroglycerin for further episodes of abdominal pain  · Suspect functional component, can trial TCA as well  -RUQ- No gallbladder findings  No intrahepatic biliary dilatation  CBD measures 6 mm  No choledocholithiasis   -Mild hepatomegaly  -  7 cm in the midclavicular line  Surface contour is smooth  Parenchyma:  Echogenicity and echotexture are within normal limits  No evidence of suspicious mass  Limited imaging of the main portal vein shows it to be patent and hepatopetal      Disposition: Pending MRI, start ACEi at d/c     SUBJECTIVE     Patient seen and examined  Pt had a low grade fever over night 100 6  Denies any upper respiratory symptoms or LUTS    Denies any chest pain, SOB, Palpitations, abdominal pain, nausea, vomiting, fever, chills  Tolerating PO diet  OBJECTIVE     Vitals:    21 2330 21 0240 21 0536 21 0647   BP: 134/84 133/85  134/85   BP Location:    Left arm   Pulse: 80 73  65   Resp: 16 15  16   Temp: (!) 100 6 °F (38 1 °C) 98 2 °F (36 8 °C)  98 3 °F (36 8 °C)   TempSrc:    Oral   SpO2: 93% 94%  96%   Weight:   68 9 kg (151 lb 12 8 oz)    Height:          Temperature:   Temp (24hrs), Av 8 °F (37 1 °C), Min:98 1 °F (36 7 °C), Max:100 6 °F (38 1 °C)    Temperature: 98 3 °F (36 8 °C)  Intake & Output:  I/O        07 -  0700  07 -  0700  07 -  0700    P  O  240 240 300    I V  (mL/kg) 1000 (14 6) 404 3 (5 9)     Total Intake(mL/kg) 1240 (18 1) 644 3 (9 4) 300 (4 4)    Urine (mL/kg/hr)  375 (0 2)     Total Output  375     Net +1240 +269 3 +300           Unmeasured Urine Occurrence 3 x          Weights:   IBW (Ideal Body Weight): 54 7 kg    Body mass index is 26 06 kg/m²  Weight (last 2 days)     Date/Time   Weight    21 0536   68 9 (151 8)    21 0456   68 4 (150 79)            Physical Exam  Vitals signs and nursing note reviewed  Constitutional:       General: She is not in acute distress  Appearance: Normal appearance  She is normal weight  She is not ill-appearing, toxic-appearing or diaphoretic  HENT:      Head: Normocephalic and atraumatic  Nose: No congestion or rhinorrhea  Cardiovascular:      Rate and Rhythm: Normal rate and regular rhythm  Pulses: Normal pulses  Heart sounds: Normal heart sounds  No murmur  No friction rub  No gallop  Pulmonary:      Effort: Pulmonary effort is normal  No respiratory distress  Breath sounds: Normal breath sounds  No stridor  No wheezing, rhonchi or rales  Chest:      Chest wall: No tenderness  Abdominal:      General: Abdomen is flat  There is no distension  Palpations: Abdomen is soft  There is no mass  Tenderness: There is no abdominal tenderness   There is no right CVA tenderness, left CVA tenderness, guarding or rebound  Hernia: No hernia is present  Musculoskeletal:         General: No swelling or tenderness  Right lower leg: No edema  Left lower leg: No edema  Skin:     Findings: No lesion  Neurological:      General: No focal deficit present  Mental Status: She is alert and oriented to person, place, and time  Psychiatric:         Mood and Affect: Mood normal          Behavior: Behavior normal        LABORATORY DATA     Labs: I have personally reviewed pertinent reports  Results from last 7 days   Lab Units 05/06/21 0534 05/05/21 0528 05/04/21 0457   WBC Thousand/uL 7 86 9 44 8 04   HEMOGLOBIN g/dL 12 6 12 7 12 6   HEMATOCRIT % 39 0 38 9 38 5   PLATELETS Thousands/uL 365 398* 385   NEUTROS PCT % 59 64 60   MONOS PCT % 15* 13* 16*      Results from last 7 days   Lab Units 05/06/21 0534 05/05/21 0528 05/04/21 0457  05/02/21 0451  04/30/21  1408   POTASSIUM mmol/L 3 9 4 1 3 6   < > 3 5   < > 3 4*   CHLORIDE mmol/L 107 108 106   < > 104   < > 111*   CO2 mmol/L 28 26 27   < > 30   < > 24   BUN mg/dL 23 27* 30*   < > 24   < > 15   CREATININE mg/dL 1 04 1 11 1 24   < > 1 13   < > 0 99   CALCIUM mg/dL 9 3 9 9 9 5   < > 9 3   < > 9 4   ALK PHOS U/L  --   --   --   --  117*  --  125*   ALT U/L  --   --   --   --  38  --  37   AST U/L  --   --   --   --  21  --  25    < > = values in this interval not displayed  Results from last 7 days   Lab Units 05/02/21 0451 05/01/21  0604 04/30/21  1408   MAGNESIUM mg/dL 2 3 2 3 2 3     Results from last 7 days   Lab Units 05/01/21  0604   PHOSPHORUS mg/dL 3 7          Results from last 7 days   Lab Units 05/04/21  0457   LACTIC ACID mmol/L 1 7     Results from last 7 days   Lab Units 04/30/21  1408   TROPONIN I ng/mL 0 03       IMAGING & DIAGNOSTIC TESTING     Radiology Results: I have personally reviewed pertinent reports    Xr Chest 1 View Portable    Result Date: 4/30/2021  Impression: Cardiomegaly with vascular congestion likely on the basis of CHF  Workstation performed: [id number]     Us Right Upper Quadrant    Result Date: 5/4/2021  Impression: Mild hepatomegaly  Stable compared to prior CT  Top normal caliber common hepatic duct without obstructing etiology identified  No filling defects  Common bile duct tapers normally through the pancreatic head  No intrahepatic ductal dilatation  Gallbladder appears within normal limits  Workstation performed: [id number]     Other Diagnostic Testing: I have personally reviewed pertinent reports  ACTIVE MEDICATIONS     Current Facility-Administered Medications   Medication Dose Route Frequency    acetaminophen (TYLENOL) tablet 650 mg  650 mg Oral Q4H PRN    acetaminophen (TYLENOL) tablet 975 mg  975 mg Oral Q6H PRN    albuterol (PROVENTIL HFA,VENTOLIN HFA) inhaler 2 puff  2 puff Inhalation Q4H PRN    atorvastatin (LIPITOR) tablet 40 mg  40 mg Oral After Dinner    calcium carbonate (TUMS) chewable tablet 1,000 mg  1,000 mg Oral Daily PRN    dicyclomine (BENTYL) tablet 20 mg  20 mg Oral TID AC    enoxaparin (LOVENOX) subcutaneous injection 40 mg  40 mg Subcutaneous Q24H WILDER    Labetalol HCl (NORMODYNE) injection 10 mg  10 mg Intravenous Q6H PRN    melatonin tablet 3 mg  3 mg Oral HS    metoprolol succinate (TOPROL-XL) 24 hr tablet 25 mg  25 mg Oral BID    nicotine (NICODERM CQ) 7 mg/24hr TD 24 hr patch 1 patch  1 patch Transdermal Daily    ondansetron (ZOFRAN) injection 4 mg  4 mg Intravenous Q6H PRN    pantoprazole (PROTONIX) EC tablet 40 mg  40 mg Oral Early Morning    senna (SENOKOT) tablet 8 6 mg  1 tablet Oral HS    simethicone (MYLICON) chewable tablet 80 mg  80 mg Oral Q6H PRN       VTE Pharmacologic Prophylaxis: Enoxaparin (Lovenox)  VTE Mechanical Prophylaxis: sequential compression device    Portions of the record may have been created with voice recognition software    Occasional wrong word or "sound a like" substitutions may have occurred due to the inherent limitations of voice recognition software    Read the chart carefully and recognize, using context, where substitutions have occurred   ==  Bossman Dailey, [facility]  Internal Medicine Residency PGY-1

## 2021-05-06 NOTE — CASE MANAGEMENT
All clinical info including cardiac MRI and ECHO from 5/1 were faxed to [facility] with Jose Maria Garnica at [phone]   [facility] reported to  that pt will be fitted for life vest tomorrow at 9am

## 2021-05-06 NOTE — PROGRESS NOTES
Cardiology Team Heart Failure Progress Note - Matt Moraes 63 y o  female MRN: [id number]    Unit/Bed#: CW2 210-02 Encounter: [id number]    Subjective:   Patient seen and examined  No overnight events  Patient with NICM on cardiac cath  Hospital Course:   Matt Moraes is a 63y o  year old female with a history of uncontrolled hypertension, self reported history of RSD with chronic pain syndrome , tobacco abuse who presented for three week history of RLQ abdominal pain and shortness of breath on exertion  Was noted to have newly diagnosed HFrEF of 32%  Was supposed to undergo LHC however postponed due to MAHENDRA  So far work up has been negative for abdominal pathology on imagings  Has Hep C antibody positive on blood work        Vitals: Blood pressure 134/85, pulse 65, temperature 98 3 °F (36 8 °C), temperature source Oral, resp  rate 16, height 5' 4" (1 626 m), weight 68 9 kg (151 lb 12 8 oz), SpO2 96 %  , Body mass index is 26 06 kg/m² ,   Orthostatic Blood Pressures      Most Recent Value   Blood Pressure  134/85 filed at 05/06/2021 7805   Patient Position - Orthostatic VS  Lying filed at 05/06/2021 2883            Intake/Output Summary (Last 24 hours) at 5/6/2021 0806  Last data filed at 5/5/2021 1829  Gross per 24 hour   Intake 644 33 ml   Output 375 ml   Net 269 33 ml         Physical Exam:    GEN: Matt Moraes appears well, alert and oriented x 3, pleasant and cooperative   HEENT:  Normocephalic, atraumatic, anicteric, moist mucous membranes  NECK: No JVD or carotid bruits   HEART: reg rhythm, reg rate, normal S1 and S2, no murmurs, clicks, gallops or rubs   LUNGS: Clear to auscultation bilaterally; no wheezes, rales, or rhonchi; respiration nonlabored   ABDOMEN:  Normoactive bowel sounds, soft, no tenderness, no distention  EXTREMITIES: peripheral pulses palpable; no edema  NEURO: no gross focal findings; cranial nerves grossly intact   SKIN:  Dry, intact, warm to touch      Current Facility-Administered Medications:     acetaminophen (TYLENOL) tablet 650 mg, 650 mg, Oral, Q4H PRN, Macario Gleason MD    acetaminophen (TYLENOL) tablet 975 mg, 975 mg, Oral, Q6H PRN, Hernandez Diaz DO, 975 mg at 05/06/21 0015    albuterol (PROVENTIL HFA,VENTOLIN HFA) inhaler 2 puff, 2 puff, Inhalation, Q4H PRN, Delmi Lama, DO    atorvastatin (LIPITOR) tablet 40 mg, 40 mg, Oral, After Guillermo Crespo MD, 40 mg at 05/05/21 1824    calcium carbonate (TUMS) chewable tablet 1,000 mg, 1,000 mg, Oral, Daily PRN, Delmi Corteza, DO    dicyclomine (BENTYL) tablet 20 mg, 20 mg, Oral, TID AC, Imelda Harding DO, 20 mg at 05/06/21 0614    enoxaparin (LOVENOX) subcutaneous injection 40 mg, 40 mg, Subcutaneous, Q24H [facility], Jason Weir MD, 40 mg at 05/05/21 0737    Labetalol HCl (NORMODYNE) injection 10 mg, 10 mg, Intravenous, Q6H PRN, Delmi Lilly DO    melatonin tablet 3 mg, 3 mg, Oral, HS, Michelle Putnam MD, 3 mg at 05/05/21 2106    metoprolol succinate (TOPROL-XL) 24 hr tablet 25 mg, 25 mg, Oral, BID, Rudolph Huddleston MD, 25 mg at 05/05/21 2106    nicotine (NICODERM CQ) 7 mg/24hr TD 24 hr patch 1 patch, 1 patch, Transdermal, Daily, Imelda Harding DO, 1 patch at 05/05/21 0800    ondansetron (ZOFRAN) injection 4 mg, 4 mg, Intravenous, Q6H PRN, Macario Gleason MD    pantoprazole (PROTONIX) EC tablet 40 mg, 40 mg, Oral, Early Morning, Jason Weir MD, 40 mg at 05/06/21 0534    potassium chloride (K-DUR,KLOR-CON) CR tablet 40 mEq, 40 mEq, Oral, Once, Yola Banana, MD    senna (SENOKOT) tablet 8 6 mg, 1 tablet, Oral, HS, Michelle Putnam MD, 8 6 mg at 05/05/21 2106    simethicone (MYLICON) chewable tablet 80 mg, 80 mg, Oral, Q6H PRN, Michelle Putnam MD, 80 mg at 05/03/21 0205    Labs & Results:  Results from last 7 days   Lab Units 04/30/21  1408   TROPONIN I ng/mL 0 03     Results from last 7 days   Lab Units 04/30/21  1408   NT-PRO BNP pg/mL 6,281*     Results from last 7 days   Lab Units 05/06/21  0534 05/05/21  0528 05/04/21  0457   POTASSIUM mmol/L 3 9 4 1 3 6   CO2 mmol/L 28 26 27   CHLORIDE mmol/L 107 108 106   BUN mg/dL 23 27* 30*   CREATININE mg/dL 1 04 1 11 1 24     Results from last 7 days   Lab Units 05/06/21  0534 05/05/21  0528 05/04/21  0457   HEMOGLOBIN g/dL 12 6 12 7 12 6   HEMATOCRIT % 39 0 38 9 38 5   PLATELETS Thousands/uL 365 398* 385     Results from last 7 days   Lab Units 04/30/21  1408   TRIGLYCERIDES mg/dL 103   HDL mg/dL 44   LDL CALC mg/dL 134*   HEMOGLOBIN A1C % 5 4       Telemetry:   Personally reviewed by Vallorie Carrel, MD:     VTE Prophylaxis: Heparin        Assessment:  Principal Problem:    New onset of congestive heart failure (HCC)  Active Problems:    Abdominal pain    Hyperlipidemia    Pulmonary nodule    Shortness of breath    Hypertensive urgency    Positive D dimer    Abnormal finding on imaging of liver    Smoker    Hypertension    Fever    #Newly diagnosed HFrEF 32%  - echocardiogram: EF 32% with severe diffuse hypokinesis, wall thickness mildly to moderately increased consistent with eccentric hypertrophy  G2DD  - NICM on cardiac cath 5/5/2021  #Longstanding uncontrolled HTN  - presented with SBP >190s, started on Lisinopril 5mg daily and metoprolol tartrate 25mg BID  #MAHENDRA (resolved)  - Nephrology following  #Tobacco abuse  - has 43 py smoking history  - nicotine patch  #RLQ abdominal pain  - VAS celiac/mesentery: aorta, celiac, splenic, hepatic arteries patent  SMA and LYDIA normal and patent  - US RUQ: Mild hepatomegaly  Stable compared to prior CT  Top normal caliber common hepatic duct without obstructing etiology identified  No filling defects  Common bile duct tapers normally through the pancreatic head  No intrahepatic ductal dilatation   Gallbladder appears within normal limits  - LFTs and t bili within range  #Positive for Hep C antibody  - denies illicit drug use  - on CT scan with noted changes suspicious for early cirrhosis  #History of RDS with chronic pain syndrome      Plan:  - LifeVest prior to discharge  - f/u cMRI given NICM on cath  - Resume Losartan vs  Entresto (pending price check) s/p cMRI  - c/w Metoprolol Succ  25mg PO BID  - Encouraged tobacco cessation  -  as patient is homeless     Case discussed and reviewed with Dr Robles Cook  Further recommendations to follow  Thank you for involving us in the care of your patient  Marifer Diaz MD  Cardiology Fellow PGY-4      Epic/ Allscripts/Care Everywhere records reviewed:     ** Please Note: Fluency DirectDictation voice to text software may have been used in the creation of this document   **

## 2021-05-06 NOTE — PROGRESS NOTES
INTERNAL MEDICINE RESIDENCY PROGRESS NOTE     Name: Melanie Augustin   Age & Sex: 63 y o  female   MRN: [id number]  Unit/Bed#: CW2 210-02   Encounter: [id number]  Team: SOD Team A    PATIENT INFORMATION     Name: Melanie Augustin   Age & Sex: 63 y o  female   MRN: [id number]  Hospital Stay Days: 4    ASSESSMENT/PLAN     Principal Problem:    New onset of congestive heart failure ([address] )  Active Problems:    Abdominal pain    Hypertension    Smoker    Hyperlipidemia    Pulmonary nodule    Shortness of breath    Hypertensive urgency    Positive D dimer    Abnormal finding on imaging of liver    Fever      * New onset of congestive heart failure (HCC)  Assessment & Plan  Wt Readings from Last 3 Encounters:   05/03/21 69 kg (152 lb 1 9 oz)   04/20/21 56 7 kg (125 lb)   03/23/18 56 7 kg (125 lb)     5/3- Echo EF 04% Grade II diastolic Dysfunction  -Diffuse hypokinesis LA dilated, Mild MR & TR  -presented w/ volume overload had a good response w/ lasix 40mg IV  -Started on Lisinopril 5mg and Lipitor 40mg  -will hold onto B-Blocker / Aldactone at this time  -Can consider Bisoprolol if respiratory distress with other B-blocker as it is more cardioselective  -will give another Lasix 20mg IV  -Monitor I/O   -Daily weight checks  -fluctuating Cr from   9 to 1 4 to 1 24  -Nephro consult per Cards   -Cath tomorrow likely   -Hold lisinopril  -Gentle hydration Pre / Post to prevent Contrast Nephropathy  -Cath triple vessel disease non-ischemic cardiomyopathy- Recommend Medical Management  -Life Vest  Pending MRI-    Abdominal pain  Assessment & Plan  Unclear etiology  Does have findings of atherosclerotic abdominal vessels on recent CT; intermittent abdominal pain although not clearly associated with food intake  Possible early cirrhosis on recent CT scan, however no significant ascites or abdominal distention  Tolerating diet well without nausea/vomiting/diarrhea/constipation        Abdominal exam remains benign and pain continues to be episodic/intermittent with no clear cause or temporality  Plan:  · Will continue Bentyl for now, monitor for improvement  · Initiate pantoprazole 40 mg daily p o  Ashok Cm · Mesenteric duplex completed, results pending official read  · Given potential anginal equivalent, consider trialing sublingual nitroglycerin for further episodes of abdominal pain  · Suspect functional component, can trial TCA as well  -RUQ- No gallbladder findings  No intrahepatic biliary dilatation  CBD measures 6 mm  No choledocholithiasis   -Mild hepatomegaly  -  7 cm in the midclavicular line  Surface contour is smooth  Parenchyma:  Echogenicity and echotexture are within normal limits  No evidence of suspicious mass  Limited imaging of the main portal vein shows it to be patent and hepatopetal     Hypertension  Assessment & Plan  See assessment and plan under hypertensive urgency above  Chronic hepatitis C virus infection (HCC)-resolved as of 5/6/2021  Assessment & Plan  -Hep C Reactive antibody   -No hx of IVDU / Transfusion / Have not had a sexual partner for decades    -5/5 Hep C Quantitative 0    Smoker  Assessment & Plan  Admitting lung imaging does show some mild emphysema  Plan  · Tobacco cessation materials on discharge  · Nicotine patch while admitted  · Tobacco cessation strongly encouraged as patient likely has underlying baseline COPD    Fever  Assessment & Plan  - 3 episodes of 100 6 T  -denies any URI, or LUTS  -Blood Cx prelim negative  -no leukocytosis  -monitor     Abnormal finding on imaging of liver  Assessment & Plan  Possible early cirrhosis noted on CT scan obtained on arrival   Chronic hepatitis panel did show high reactivity for hep C antibodies  Plan:  · Will recommend outpatient elastography  · Will check hep C viral RNA    Positive D dimer  Assessment & Plan  Patient with a positive D-dimer from her previous admission at [facility] 10 days ago at 2 17   CT PE protocol negative  Patient's D-dimer on admission 3 09  Doubt that her dyspnea is secondary to any new pulmonary embolism  Patient is not tachycardic, no ECG findings suggesting new PE  No history of DVT or PE  Plan:  · Will plan to treat new onset heart failure as above    Hypertensive urgency  Assessment & Plan  Patient with a blood pressure of 198/111 on arrival   Lab work indicating no signs of end-organ damage  Blood pressures are improving on antihypertensive  Plan:  · Labetalol 10 mg as needed every 6 hours for SBP greater than 180  · Continue amlodipine 5 mg daily  · Monitor pressures closely    Shortness of breath  Assessment & Plan  Patient presented with shortness of breath initially concerning for heart failure etiology given radiograph and cardiac serologies, however etiology appears unclear at this time  Shortness of breath seems secondary to bouts of abdominal pain  Additionally, she remains euvolemic on exam today without any signs or symptoms concerning for heart failure  Plan:  · Will continue holding off further diuresis  · TTE obtained yesterday, await official read, however as patient did have profoundly elevated NT proBNP with signs of LVH on presenting EKG, anticipate ejection fraction to be reduced and patient to require ischemic evaluation, either in the inpatient or outpatient setting  · -See CHF    Pulmonary nodule  Assessment & Plan  5 mm pulmonary nodule in the right middle lobe identified on arrival   Patient has a long standing history of smoking, would recommend outpatient follow-up for repeat imaging  Hyperlipidemia  Assessment & Plan  Patient has not seen a physician in years  Last lipid panel in 2015 revealed a total cholesterol 230, triglycerides 163, HDL 43,   Patient is not on any medication as an outpatient  Lipid panel obtained on arrival shows total cholesterol 199, triglycerides 103, HDL 44,        Plan:  · ASCVD risk of 10 8%, can consider statin therapy as an outpatient or inpatient pending further results    MAHENDRA (acute kidney injury) (HCC)-resolved as of 2021  Assessment & Plan  -Cr 1 45 from 0 96 baseline around 1  -likely prerenal in etiology  -stable now Cr 1 24  -avoid Hypotension / NSAIDs      Disposition: Pending Cardiac MRI      SUBJECTIVE     Patient seen and examined  Pt had a low grade fever over night 100 6  Denies any upper respiratory symptoms or LUTS  Denies any chest pain, SOB, Palpitations, abdominal pain, nausea, vomiting, fever, chills  Tolerating PO diet  OBJECTIVE     Vitals:    21 2330 21 0240 21 0536 21 0647   BP: 134/84 133/85  134/85   BP Location:    Left arm   Pulse: 80 73  65   Resp: 16 15  16   Temp: (!) 100 6 °F (38 1 °C) 98 2 °F (36 8 °C)  98 3 °F (36 8 °C)   TempSrc:    Oral   SpO2: 93% 94%  96%   Weight:   68 9 kg (151 lb 12 8 oz)    Height:          Temperature:   Temp (24hrs), Av 8 °F (37 1 °C), Min:98 1 °F (36 7 °C), Max:100 6 °F (38 1 °C)    Temperature: 98 3 °F (36 8 °C)  Intake & Output:  I/O        07 -  0700  07 -  0700  07 -  0700    P  O  240 240 300    I V  (mL/kg) 1000 (14 6) 404 3 (5 9)     Total Intake(mL/kg) 1240 (18 1) 644 3 (9 4) 300 (4 4)    Urine (mL/kg/hr)  375 (0 2)     Total Output  375     Net +1240 +269 3 +300           Unmeasured Urine Occurrence 3 x          Weights:   IBW (Ideal Body Weight): 54 7 kg    Body mass index is 26 06 kg/m²  Weight (last 2 days)     Date/Time   Weight    21 0536   68 9 (151 8)    21 0456   68 4 (150 79)            Physical Exam  Constitutional:       General: She is not in acute distress  Appearance: She is normal weight  She is not ill-appearing, toxic-appearing or diaphoretic  HENT:      Head: Normocephalic and atraumatic  Nose: No congestion or rhinorrhea  Cardiovascular:      Rate and Rhythm: Normal rate and regular rhythm  Pulses: Normal pulses        Heart sounds: Normal heart sounds  No murmur  No friction rub  No gallop  Pulmonary:      Effort: Pulmonary effort is normal  No respiratory distress  Breath sounds: Normal breath sounds  No stridor  No wheezing, rhonchi or rales  Chest:      Chest wall: No tenderness  Abdominal:      General: Bowel sounds are normal  There is no distension  Palpations: Abdomen is soft  There is no mass  Tenderness: There is no abdominal tenderness  There is no right CVA tenderness, left CVA tenderness, guarding or rebound  Hernia: No hernia is present  Musculoskeletal:      Right lower leg: No edema  Left lower leg: No edema  Neurological:      General: No focal deficit present  Mental Status: She is alert and oriented to person, place, and time  Psychiatric:         Mood and Affect: Mood normal          Behavior: Behavior normal        LABORATORY DATA     Labs: I have personally reviewed pertinent reports  Results from last 7 days   Lab Units 05/06/21 0534 05/05/21 0528 05/04/21 0457   WBC Thousand/uL 7 86 9 44 8 04   HEMOGLOBIN g/dL 12 6 12 7 12 6   HEMATOCRIT % 39 0 38 9 38 5   PLATELETS Thousands/uL 365 398* 385   NEUTROS PCT % 59 64 60   MONOS PCT % 15* 13* 16*      Results from last 7 days   Lab Units 05/06/21 0534 05/05/21 0528 05/04/21 0457 05/02/21 0451 04/30/21  1408   POTASSIUM mmol/L 3 9 4 1 3 6   < > 3 5   < > 3 4*   CHLORIDE mmol/L 107 108 106   < > 104   < > 111*   CO2 mmol/L 28 26 27   < > 30   < > 24   BUN mg/dL 23 27* 30*   < > 24   < > 15   CREATININE mg/dL 1 04 1 11 1 24   < > 1 13   < > 0 99   CALCIUM mg/dL 9 3 9 9 9 5   < > 9 3   < > 9 4   ALK PHOS U/L  --   --   --   --  117*  --  125*   ALT U/L  --   --   --   --  38  --  37   AST U/L  --   --   --   --  21  --  25    < > = values in this interval not displayed       Results from last 7 days   Lab Units 05/02/21 0451 05/01/21 0604 04/30/21  1408   MAGNESIUM mg/dL 2 3 2 3 2 3     Results from last 7 days   Lab Units 05/01/21 0604 PHOSPHORUS mg/dL 3 7          Results from last 7 days   Lab Units 05/04/21  0457   LACTIC ACID mmol/L 1 7     Results from last 7 days   Lab Units 04/30/21  1408   TROPONIN I ng/mL 0 03       IMAGING & DIAGNOSTIC TESTING     Radiology Results: I have personally reviewed pertinent reports  Xr Chest 1 View Portable    Result Date: 4/30/2021  Impression: Cardiomegaly with vascular congestion likely on the basis of CHF  Workstation performed: [id number]     Us Right Upper Quadrant    Result Date: 5/4/2021  Impression: Mild hepatomegaly  Stable compared to prior CT  Top normal caliber common hepatic duct without obstructing etiology identified  No filling defects  Common bile duct tapers normally through the pancreatic head  No intrahepatic ductal dilatation  Gallbladder appears within normal limits  Workstation performed: [id number]     Other Diagnostic Testing: I have personally reviewed pertinent reports      ACTIVE MEDICATIONS     Current Facility-Administered Medications   Medication Dose Route Frequency    acetaminophen (TYLENOL) tablet 650 mg  650 mg Oral Q4H PRN    acetaminophen (TYLENOL) tablet 975 mg  975 mg Oral Q6H PRN    albuterol (PROVENTIL HFA,VENTOLIN HFA) inhaler 2 puff  2 puff Inhalation Q4H PRN    atorvastatin (LIPITOR) tablet 40 mg  40 mg Oral After Dinner    calcium carbonate (TUMS) chewable tablet 1,000 mg  1,000 mg Oral Daily PRN    dicyclomine (BENTYL) tablet 20 mg  20 mg Oral TID AC    enoxaparin (LOVENOX) subcutaneous injection 40 mg  40 mg Subcutaneous Q24H WILDER    Labetalol HCl (NORMODYNE) injection 10 mg  10 mg Intravenous Q6H PRN    melatonin tablet 3 mg  3 mg Oral HS    metoprolol succinate (TOPROL-XL) 24 hr tablet 25 mg  25 mg Oral BID    nicotine (NICODERM CQ) 7 mg/24hr TD 24 hr patch 1 patch  1 patch Transdermal Daily    ondansetron (ZOFRAN) injection 4 mg  4 mg Intravenous Q6H PRN    pantoprazole (PROTONIX) EC tablet 40 mg  40 mg Oral Early Morning    senna (SENOKOT) tablet 8 6 mg  1 tablet Oral HS    simethicone (MYLICON) chewable tablet 80 mg  80 mg Oral Q6H PRN       VTE Pharmacologic Prophylaxis: Enoxaparin (Lovenox)  VTE Mechanical Prophylaxis: sequential compression device    Portions of the record may have been created with voice recognition software  Occasional wrong word or "sound a like" substitutions may have occurred due to the inherent limitations of voice recognition software    Read the chart carefully and recognize, using context, where substitutions have occurred   ==  Joanne Gant MD  [address] [facility]  Internal Medicine Residency PGY-1

## 2021-05-06 NOTE — CASE MANAGEMENT
CM received medical order form for pt's life vest  Clinical information send to [place] with Heidi Chambers at [phone]

## 2021-05-06 NOTE — ASSESSMENT & PLAN NOTE
Unclear etiology   Does have findings of atherosclerotic abdominal vessels on recent CT; intermittent abdominal pain although not clearly associated with food intake   Possible early cirrhosis on recent CT scan, however no significant ascites or abdominal distention   Tolerating diet well without nausea/vomiting/diarrhea/constipation        Abdominal exam remains benign and pain continues to be episodic/intermittent with no clear cause or temporality  Plan:  · Will continue Bentyl for now, monitor for improvement  · Initiate pantoprazole 40 mg daily p o  Lajean Cassette · Mesenteric duplex completed, results pending official read  · Given potential anginal equivalent, consider trialing sublingual nitroglycerin for further episodes of abdominal pain  · Suspect functional component, can trial TCA as well  -RUQ- No gallbladder findings  No intrahepatic biliary dilatation  CBD measures 6 mm  No choledocholithiasis   -Mild hepatomegaly  -  7 cm in the midclavicular line  Surface contour is smooth  Parenchyma:  Echogenicity and echotexture are within normal limits  No evidence of suspicious mass    Limited imaging of the main portal vein shows it to be patent and hepatopetal

## 2021-05-07 VITALS
OXYGEN SATURATION: 93 % | HEART RATE: 76 BPM | TEMPERATURE: 99.1 F | DIASTOLIC BLOOD PRESSURE: 76 MMHG | RESPIRATION RATE: 18 BRPM | HEIGHT: 64 IN | SYSTOLIC BLOOD PRESSURE: 135 MMHG | BODY MASS INDEX: 25.99 KG/M2 | WEIGHT: 152.2 LBS

## 2021-05-07 LAB
ANION GAP SERPL CALCULATED.3IONS-SCNC: 6 MMOL/L (ref 4–13)
BASOPHILS # BLD AUTO: 0.06 THOUSANDS/ΜL (ref 0–0.1)
BASOPHILS NFR BLD AUTO: 1 % (ref 0–1)
BUN SERPL-MCNC: 12 MG/DL (ref 5–25)
CALCIUM SERPL-MCNC: 9.4 MG/DL (ref 8.3–10.1)
CHLORIDE SERPL-SCNC: 106 MMOL/L (ref 100–108)
CO2 SERPL-SCNC: 26 MMOL/L (ref 21–32)
CREAT SERPL-MCNC: 0.9 MG/DL (ref 0.6–1.3)
EOSINOPHIL # BLD AUTO: 0.14 THOUSAND/ΜL (ref 0–0.61)
EOSINOPHIL NFR BLD AUTO: 2 % (ref 0–6)
ERYTHROCYTE [DISTWIDTH] IN BLOOD BY AUTOMATED COUNT: 12.7 % (ref 11.6–15.1)
GFR SERPL CREATININE-BSD FRML MDRD: 70 ML/MIN/1.73SQ M
GLUCOSE SERPL-MCNC: 125 MG/DL (ref 65–140)
HCT VFR BLD AUTO: 35.7 % (ref 34.8–46.1)
HGB BLD-MCNC: 11.8 G/DL (ref 11.5–15.4)
IMM GRANULOCYTES # BLD AUTO: 0.03 THOUSAND/UL (ref 0–0.2)
IMM GRANULOCYTES NFR BLD AUTO: 0 % (ref 0–2)
LYMPHOCYTES # BLD AUTO: 1.2 THOUSANDS/ΜL (ref 0.6–4.47)
LYMPHOCYTES NFR BLD AUTO: 17 % (ref 14–44)
MCH RBC QN AUTO: 30 PG (ref 26.8–34.3)
MCHC RBC AUTO-ENTMCNC: 33.1 G/DL (ref 31.4–37.4)
MCV RBC AUTO: 91 FL (ref 82–98)
MONOCYTES # BLD AUTO: 0.96 THOUSAND/ΜL (ref 0.17–1.22)
MONOCYTES NFR BLD AUTO: 14 % (ref 4–12)
NEUTROPHILS # BLD AUTO: 4.71 THOUSANDS/ΜL (ref 1.85–7.62)
NEUTS SEG NFR BLD AUTO: 66 % (ref 43–75)
NRBC BLD AUTO-RTO: 0 /100 WBCS
PLATELET # BLD AUTO: 341 THOUSANDS/UL (ref 149–390)
PMV BLD AUTO: 10.2 FL (ref 8.9–12.7)
POTASSIUM SERPL-SCNC: 3.7 MMOL/L (ref 3.5–5.3)
RBC # BLD AUTO: 3.93 MILLION/UL (ref 3.81–5.12)
SODIUM SERPL-SCNC: 138 MMOL/L (ref 136–145)
WBC # BLD AUTO: 7.1 THOUSAND/UL (ref 4.31–10.16)

## 2021-05-07 PROCEDURE — 99231 SBSQ HOSP IP/OBS SF/LOW 25: CPT | Performed by: INTERNAL MEDICINE

## 2021-05-07 PROCEDURE — 80048 BASIC METABOLIC PNL TOTAL CA: CPT | Performed by: STUDENT IN AN ORGANIZED HEALTH CARE EDUCATION/TRAINING PROGRAM

## 2021-05-07 PROCEDURE — 85025 COMPLETE CBC W/AUTO DIFF WBC: CPT | Performed by: STUDENT IN AN ORGANIZED HEALTH CARE EDUCATION/TRAINING PROGRAM

## 2021-05-07 PROCEDURE — NC001 PR NO CHARGE: Performed by: INTERNAL MEDICINE

## 2021-05-07 PROCEDURE — 99238 HOSP IP/OBS DSCHRG MGMT 30/<: CPT | Performed by: INTERNAL MEDICINE

## 2021-05-07 PROCEDURE — 99232 SBSQ HOSP IP/OBS MODERATE 35: CPT | Performed by: INTERNAL MEDICINE

## 2021-05-07 RX ORDER — ALBUTEROL SULFATE 90 UG/1
2 AEROSOL, METERED RESPIRATORY (INHALATION) EVERY 4 HOURS PRN
Qty: 8 G | Refills: 1 | Status: SHIPPED | OUTPATIENT
Start: 2021-05-07 | End: 2022-01-10 | Stop reason: HOSPADM

## 2021-05-07 RX ORDER — METOPROLOL SUCCINATE 25 MG/1
25 TABLET, EXTENDED RELEASE ORAL 2 TIMES DAILY
Qty: 60 TABLET | Refills: 0 | Status: SHIPPED | OUTPATIENT
Start: 2021-05-07 | End: 2021-05-29

## 2021-05-07 RX ORDER — LANOLIN ALCOHOL/MO/W.PET/CERES
3 CREAM (GRAM) TOPICAL
Qty: 30 TABLET | Refills: 0 | Status: SHIPPED | OUTPATIENT
Start: 2021-05-07 | End: 2022-01-10 | Stop reason: HOSPADM

## 2021-05-07 RX ORDER — LOSARTAN POTASSIUM 50 MG/1
50 TABLET ORAL DAILY
Qty: 30 TABLET | Refills: 0 | Status: SHIPPED | OUTPATIENT
Start: 2021-05-07 | End: 2021-05-29

## 2021-05-07 RX ORDER — CALCIUM CARBONATE 200(500)MG
1000 TABLET,CHEWABLE ORAL DAILY PRN
Qty: 30 TABLET | Refills: 0 | Status: SHIPPED | OUTPATIENT
Start: 2021-05-07 | End: 2022-01-10 | Stop reason: HOSPADM

## 2021-05-07 RX ORDER — FUROSEMIDE 20 MG/1
20 TABLET ORAL AS NEEDED
Qty: 30 TABLET | Refills: 0 | Status: SHIPPED | OUTPATIENT
Start: 2021-05-07 | End: 2021-05-31

## 2021-05-07 RX ORDER — DICYCLOMINE HCL 20 MG
20 TABLET ORAL
Qty: 90 TABLET | Refills: 0 | Status: SHIPPED | OUTPATIENT
Start: 2021-05-07 | End: 2021-05-31

## 2021-05-07 RX ORDER — ATORVASTATIN CALCIUM 40 MG/1
40 TABLET, FILM COATED ORAL
Qty: 30 TABLET | Refills: 0 | Status: SHIPPED | OUTPATIENT
Start: 2021-05-07 | End: 2021-05-29

## 2021-05-07 RX ORDER — TRIAMCINOLONE ACETONIDE 1 MG/G
CREAM TOPICAL 2 TIMES DAILY
Qty: 30 G | Refills: 1 | Status: SHIPPED | OUTPATIENT
Start: 2021-05-07 | End: 2022-01-10 | Stop reason: HOSPADM

## 2021-05-07 RX ADMIN — NICOTINE 7 MG/24 HR DAILY TRANSDERMAL PATCH 1 PATCH: at 08:05

## 2021-05-07 RX ADMIN — METOPROLOL SUCCINATE 25 MG: 25 TABLET, FILM COATED, EXTENDED RELEASE ORAL at 08:02

## 2021-05-07 RX ADMIN — DICYCLOMINE HYDROCHLORIDE 20 MG: 20 TABLET ORAL at 07:59

## 2021-05-07 RX ADMIN — PANTOPRAZOLE SODIUM 40 MG: 40 TABLET, DELAYED RELEASE ORAL at 05:42

## 2021-05-07 NOTE — PROGRESS NOTES
NEPHROLOGY PROGRESS NOTE   Vicki Quinonez 63 y o  female MRN: [id number]  Unit/Bed#: [facility] 210-02 Encounter: [id number]      ASSESSMENT & PLAN:  1  Mild acute kidney injury, baseline creatinine 0 9-1, creatinine increased to 1 4 in the setting of diuretics, Ace inhibitors, Toradol dose, relative hypotension  Renal function improved, creatinine down to 0 9 today  Status post cardiac catheterization on 05/05 with 70 cc of contrast given  Stable from renal standpoint of view to be discharged  Okay to resume Ace inhibitors after discharge  2  Newly diagnosed heart failure with reduced EF with an EF of 32%, status post cardiac catheterization on 05/05 showing 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, medical therapy recommended by Cardiology  3  Uncontrolled hypertension, blood pressure significant improved, on metoprolol, Ace inhibitors can be resumed at discharge    4  Tobacco abuse, smoking sensation per primary team    My plan and recommendations were discussed with Internal Medicine team      SUBJECTIVE:  Patient seen and examined, feeling okay, denies any complaints, no chest pain, no shortness of breath, no nausea, no vomiting        OBJECTIVE:  Current Weight: Weight - Scale: 69 kg (152 lb 3 2 oz)  Vitals:    05/07/21 0802   BP: 135/76   Pulse: 76   Resp: 18   Temp: 99 1 °F (37 3 °C)   SpO2: 93%       Intake/Output Summary (Last 24 hours) at 5/7/2021 0910  Last data filed at 5/7/2021 0542  Gross per 24 hour   Intake 960 ml   Output --   Net 960 ml     General: conscious, cooperative, in not acute distress  Eyes: conjunctivae pink, anicteric sclerae  ENT: lips and mucous membranes moist  Neck: supple, no JVD  Chest: clear breath sounds bilateral, no crackles, ronchus or wheezings  CVS: distinct S1 & S2, normal rate, regular rhythm  Abdomen: soft, non-tender, non-distended, normoactive bowel sounds  Extremities: no edema of both legs  Skin: no rash  Neuro: awake, alert, oriented        Medications:    Current Facility-Administered Medications:     acetaminophen (TYLENOL) tablet 650 mg, 650 mg, Oral, Q4H PRN, Jonas Perry MD    acetaminophen (TYLENOL) tablet 975 mg, 975 mg, Oral, Q6H PRN, Avelino Membreno DO, 975 mg at 05/06/21 1845    albuterol (PROVENTIL HFA,VENTOLIN HFA) inhaler 2 puff, 2 puff, Inhalation, Q4H PRN, An Portillo DO    atorvastatin (LIPITOR) tablet 40 mg, 40 mg, Oral, After Phuc Mei MD, 40 mg at 05/06/21 1724    calcium carbonate (TUMS) chewable tablet 1,000 mg, 1,000 mg, Oral, Daily PRN, An Portillo DO    dicyclomine (BENTYL) tablet 20 mg, 20 mg, Oral, TID AC, Imelda Harding DO, 20 mg at 05/07/21 0759    enoxaparin (LOVENOX) subcutaneous injection 40 mg, 40 mg, Subcutaneous, Q24H [facility], Tono Styles MD, 40 mg at 05/06/21 0915    Labetalol HCl (NORMODYNE) injection 10 mg, 10 mg, Intravenous, Q6H PRN, Imelda Harding DO    melatonin tablet 3 mg, 3 mg, Oral, HS, Panda Pierson MD, 3 mg at 05/06/21 2121    metoprolol succinate (TOPROL-XL) 24 hr tablet 25 mg, 25 mg, Oral, BID, Julieta Geronimo MD, 25 mg at 05/07/21 0802    nicotine (NICODERM CQ) 7 mg/24hr TD 24 hr patch 1 patch, 1 patch, Transdermal, Daily, Imelad Harding DO, 1 patch at 05/07/21 0805    ondansetron (ZOFRAN) injection 4 mg, 4 mg, Intravenous, Q6H PRN, Jonas Perry MD    pantoprazole (PROTONIX) EC tablet 40 mg, 40 mg, Oral, Early Morning, Tono Styles MD, 40 mg at 05/07/21 0542    senna (SENOKOT) tablet 8 6 mg, 1 tablet, Oral, HS, Panda Pierson MD, 8 6 mg at 05/05/21 2106    simethicone (MYLICON) chewable tablet 80 mg, 80 mg, Oral, Q6H PRN, Panda Pierson MD, 80 mg at 05/03/21 0205    Invasive Devices:        Lab Results:   Results from last 7 days   Lab Units 05/07/21  0645 05/06/21  0534 05/05/21  0528  05/02/21  0451 05/01/21  0604 04/30/21  1408   WBC Thousand/uL 7 10 7 86 9 44   < > 9 16 8 00 8 58   HEMOGLOBIN g/dL 11 8 12 6 12 7   < > 12 8 12 3 12 4 HEMATOCRIT % 35 7 39 0 38 9   < > 38 0 37 1 37 6   PLATELETS Thousands/uL 341 365 398*   < > 324 300 288   SODIUM mmol/L 138 139 139   < > 138 140 141   POTASSIUM mmol/L 3 7 3 9 4 1   < > 3 5 3 8 3 4*   CHLORIDE mmol/L 106 107 108   < > 104 108 111*   CO2 mmol/L 26 28 26   < > 30 25 24   BUN mg/dL 12 23 27*   < > 24 21 15   CREATININE mg/dL 0 90 1 04 1 11   < > 1 13 1 13 0 99   CALCIUM mg/dL 9 4 9 3 9 9   < > 9 3 9 3 9 4   MAGNESIUM mg/dL  --   --   --   --  2 3 2 3 2 3   PHOSPHORUS mg/dL  --   --   --   --   --  3 7  --    ALK PHOS U/L  --   --   --   --  117*  --  125*   ALT U/L  --   --   --   --  38  --  37   AST U/L  --   --   --   --  21  --  25    < > = values in this interval not displayed  Portions of the record may have been created with voice recognition software  Occasional wrong word or "sound a like" substitutions may have occurred due to the inherent limitations of voice recognition software  Read the chart carefully and recognize, using context, where substitutions have occurred  If you have any questions, please contact the dictating provider

## 2021-05-07 NOTE — PROGRESS NOTES
Cardiology Team Heart Failure Progress Note - Jacklyn Bravo 63 y o  female MRN: [id number]    Unit/Bed#: CW2 210-02 Encounter: [id number]    Subjective:   Patient seen and examined  No overnight events  D/c planning for today  Hospital Course:   Jacklyn Bravo is a 63y o  year old female with a history of uncontrolled hypertension, self reported history of RSD with chronic pain syndrome , tobacco abuse who presented for three week history of RLQ abdominal pain and shortness of breath on exertion  Was noted to have newly diagnosed HFrEF of 32%  Was supposed to undergo LHC however postponed due to MAHENDRA  So far work up has been negative for abdominal pathology on imagings  Has Hep C antibody positive on blood work        Vitals: Blood pressure 132/74, pulse 71, temperature 99 3 °F (37 4 °C), temperature source Oral, resp  rate 19, height 5' 4" (1 626 m), weight 69 kg (152 lb 3 2 oz), SpO2 99 %  , Body mass index is 26 13 kg/m² ,   Orthostatic Blood Pressures      Most Recent Value   Blood Pressure  132/74 filed at 05/07/2021 0241   Patient Position - Orthostatic VS  Lying filed at 05/07/2021 0241            Intake/Output Summary (Last 24 hours) at 5/7/2021 0725  Last data filed at 5/7/2021 0542  Gross per 24 hour   Intake 1260 ml   Output --   Net 1260 ml         Physical Exam:    GEN: Jacklyn Bravo appears well, alert and oriented x 3, pleasant and cooperative   HEENT:  Normocephalic, atraumatic, anicteric, moist mucous membranes  NECK: No JVD or carotid bruits   HEART: reg rhythm, reg rate, normal S1 and S2, no murmurs, clicks, gallops or rubs   LUNGS: Clear to auscultation bilaterally; no wheezes, rales, or rhonchi; respiration nonlabored   ABDOMEN:  Normoactive bowel sounds, soft, no tenderness, no distention  EXTREMITIES: peripheral pulses palpable; no edema  NEURO: no gross focal findings; cranial nerves grossly intact   SKIN:  Dry, intact, warm to touch      Current Facility-Administered Medications:     acetaminophen (TYLENOL) tablet 650 mg, 650 mg, Oral, Q4H PRN, Sam Harvey MD    acetaminophen (TYLENOL) tablet 975 mg, 975 mg, Oral, Q6H PRN, Yuri Millerolla, DO, 975 mg at 05/06/21 1845    albuterol (PROVENTIL HFA,VENTOLIN HFA) inhaler 2 puff, 2 puff, Inhalation, Q4H PRN, Arlyn Sheffield DO    atorvastatin (LIPITOR) tablet 40 mg, 40 mg, Oral, After Dina Ramirez MD, 40 mg at 05/06/21 1724    calcium carbonate (TUMS) chewable tablet 1,000 mg, 1,000 mg, Oral, Daily PRN, Arlyn Sheffield DO    dicyclomine (BENTYL) tablet 20 mg, 20 mg, Oral, TID AC, Imelda Harding DO, 20 mg at 05/06/21 1724    enoxaparin (LOVENOX) subcutaneous injection 40 mg, 40 mg, Subcutaneous, Q24H [facility], Lenora Goldmann, MD, 40 mg at 05/06/21 0915    Labetalol HCl (NORMODYNE) injection 10 mg, 10 mg, Intravenous, Q6H PRN, Imelda Harding DO    melatonin tablet 3 mg, 3 mg, Oral, HS, Genie Allred MD, 3 mg at 05/06/21 2121    metoprolol succinate (TOPROL-XL) 24 hr tablet 25 mg, 25 mg, Oral, BID, Madhu Lomax MD, 25 mg at 05/06/21 2121    nicotine (NICODERM CQ) 7 mg/24hr TD 24 hr patch 1 patch, 1 patch, Transdermal, Daily, Imelda Harding DO, 1 patch at 05/06/21 0914    ondansetron (ZOFRAN) injection 4 mg, 4 mg, Intravenous, Q6H PRN, Sam Harvey MD    pantoprazole (PROTONIX) EC tablet 40 mg, 40 mg, Oral, Early Morning, Lenora Goldmann, MD, 40 mg at 05/07/21 0542    senna (SENOKOT) tablet 8 6 mg, 1 tablet, Oral, HS, Genie Allred MD, 8 6 mg at 05/05/21 2106    simethicone (MYLICON) chewable tablet 80 mg, 80 mg, Oral, Q6H PRN, Genie Allred MD, 80 mg at 05/03/21 0205    Labs & Results:  Results from last 7 days   Lab Units 04/30/21  1408   TROPONIN I ng/mL 0 03     Results from last 7 days   Lab Units 04/30/21  1408   NT-PRO BNP pg/mL 6,281*     Results from last 7 days   Lab Units 05/07/21  0645 05/06/21  0534 05/05/21  0528   POTASSIUM mmol/L 3 7 3 9 4 1   CO2 mmol/L 26 28 26   CHLORIDE mmol/L 106 107 108   BUN mg/dL 12 23 27*   CREATININE mg/dL 0 90 1 04 1 11     Results from last 7 days   Lab Units 05/07/21  0645 05/06/21  0534 05/05/21  0528   HEMOGLOBIN g/dL 11 8 12 6 12 7   HEMATOCRIT % 35 7 39 0 38 9   PLATELETS Thousands/uL 341 365 398*     Results from last 7 days   Lab Units 04/30/21  1408   TRIGLYCERIDES mg/dL 103   HDL mg/dL 44   LDL CALC mg/dL 134*   HEMOGLOBIN A1C % 5 4       Telemetry:   Personally reviewed by Analilia Caldwell MD:     VTE Prophylaxis: Heparin        Assessment:  Principal Problem:    New onset of congestive heart failure (HCC)  Active Problems:    Abdominal pain    Hyperlipidemia    Pulmonary nodule    Shortness of breath    Hypertensive urgency    Positive D dimer    Abnormal finding on imaging of liver    Smoker    Hypertension    Fever    #Newly diagnosed HFrEF 32%  - echocardiogram: EF 32% with severe diffuse hypokinesis, wall thickness mildly to moderately increased consistent with eccentric hypertrophy  G2DD  - NICM on cardiac cath 5/5/2021  - MRI (5/76/2021): Severely dilated LV with moderate to severely reduced function  No evidence of scarring, inflammation or infiltrative disease  #Longstanding uncontrolled HTN  #MAHENDRA (resolved)  #Tobacco abuse  - has 43 py smoking history  #RLQ abdominal pain  - VAS celiac/mesentery: aorta, celiac, splenic, hepatic arteries patent  SMA and LYDIA normal and patent  - US RUQ: Mild hepatomegaly  Stable compared to prior CT  Top normal caliber common hepatic duct without obstructing etiology identified  No filling defects  Common bile duct tapers normally through the pancreatic head  No intrahepatic ductal dilatation  Gallbladder appears within normal limits  #Positive for Hep C antibody  - denies illicit drug use  - on CT scan with noted changes suspicious for early cirrhosis  #History of RDS with chronic pain syndrome      Plan:  - Patient refusing LifeVest because she states that if her heart were to stop she does not want to be revived    --> Patient understands the risks of this decision and wishes to proceed as stated above  - Resume Losartan for now and will consider Entresto as an outpatient  - c/w Metoprolol Succ  25mg PO BID  - c/w Lipitor 40mg PO QHS  - Will send home with Lasix 20mg PO PRN  --> Patient instructed to take daily weight and if there is an increase of 3-5 lbs, she should take a Lasix dose  - Encouraged tobacco cessation  -  as patient is homeless   - d/c per primary    Case discussed and reviewed with Dr Aleyda Vazquez  Further recommendations to follow  Thank you for involving us in the care of your patient  Vallorie Carrel, MD  Cardiology Fellow PGY-4      Epic/ Allscripts/Care Everywhere records reviewed:     ** Please Note: Fluency DirectDictation voice to text software may have been used in the creation of this document   **

## 2021-05-07 NOTE — PLAN OF CARE
Problem: PAIN - ADULT  Goal: Verbalizes/displays adequate comfort level or baseline comfort level  Description: Interventions:  - Encourage patient to monitor pain and request assistance  - Assess pain using appropriate pain scale  - Administer analgesics based on type and severity of pain and evaluate response  - Implement non-pharmacological measures as appropriate and evaluate response  - Consider cultural and social influences on pain and pain management  - Notify physician/advanced practitioner if interventions unsuccessful or patient reports new pain  Outcome: Progressing     Problem: INFECTION - ADULT  Goal: Absence or prevention of progression during hospitalization  Description: INTERVENTIONS:  - Assess and monitor for signs and symptoms of infection  - Monitor lab/diagnostic results  - Monitor all insertion sites, i e  indwelling lines, tubes, and drains  - Monitor endotracheal if appropriate and nasal secretions for changes in amount and color  - [place] appropriate cooling/warming therapies per order  - Administer medications as ordered  - Instruct and encourage patient and family to use good hand hygiene technique  - Identify and instruct in appropriate isolation precautions for identified infection/condition  Outcome: Progressing  Goal: Absence of fever/infection during neutropenic period  Description: INTERVENTIONS:  - Monitor WBC    Outcome: Progressing     Problem: SAFETY ADULT  Goal: Patient will remain free of falls  Description: INTERVENTIONS:  - Assess patient frequently for physical needs  -  Identify cognitive and physical deficits and behaviors that affect risk of falls    -  [place] fall precautions as indicated by assessment   - Educate patient/family on patient safety including physical limitations  - Instruct patient to call for assistance with activity based on assessment  - Modify environment to reduce risk of injury  - Consider OT/PT consult to assist with strengthening/mobility  Outcome: Progressing  Goal: Maintain or return to baseline ADL function  Description: INTERVENTIONS:  -  Assess patient's ability to carry out ADLs; assess patient's baseline for ADL function and identify physical deficits which impact ability to perform ADLs (bathing, care of mouth/teeth, toileting, grooming, dressing, etc )  - Assess/evaluate cause of self-care deficits   - Assess range of motion  - Assess patient's mobility; develop plan if impaired  - Assess patient's need for assistive devices and provide as appropriate  - Encourage maximum independence but intervene and supervise when necessary  - Involve family in performance of ADLs  - Assess for home care needs following discharge   - Consider OT consult to assist with ADL evaluation and planning for discharge  - Provide patient education as appropriate  Outcome: Progressing  Goal: Maintain or return mobility status to optimal level  Description: INTERVENTIONS:  - Assess patient's baseline mobility status (ambulation, transfers, stairs, etc )    - Identify cognitive and physical deficits and behaviors that affect mobility  - Identify mobility aids required to assist with transfers and/or ambulation (gait belt, sit-to-stand, lift, walker, cane, etc )  - [place] fall precautions as indicated by assessment  - Record patient progress and toleration of activity level on Mobility SBAR; progress patient to next Phase/Stage  - Instruct patient to call for assistance with activity based on assessment  - Consider rehabilitation consult to assist with strengthening/weightbearing, etc   Outcome: Progressing     Problem: DISCHARGE PLANNING  Goal: Discharge to home or other facility with appropriate resources  Description: INTERVENTIONS:  - Identify barriers to discharge w/patient and caregiver  - Arrange for needed discharge resources and transportation as appropriate  - Identify discharge learning needs (meds, wound care, etc )  - Arrange for interpretive services to assist at discharge as needed  - Refer to Case Management Department for coordinating discharge planning if the patient needs post-hospital services based on physician/advanced practitioner order or complex needs related to functional status, cognitive ability, or social support system  Outcome: Progressing     Problem: Knowledge Deficit  Goal: Patient/family/caregiver demonstrates understanding of disease process, treatment plan, medications, and discharge instructions  Description: Complete learning assessment and assess knowledge base  Interventions:  - Provide teaching at level of understanding  - Provide teaching via preferred learning methods  Outcome: Progressing     Problem: Potential for Falls  Goal: Patient will remain free of falls  Description: INTERVENTIONS:  - Assess patient frequently for physical needs  -  Identify cognitive and physical deficits and behaviors that affect risk of falls    -  [place] fall precautions as indicated by assessment   - Educate patient/family on patient safety including physical limitations  - Instruct patient to call for assistance with activity based on assessment  - Modify environment to reduce risk of injury  - Consider OT/PT consult to assist with strengthening/mobility  Outcome: Progressing

## 2021-05-07 NOTE — NURSING NOTE
Pt refusing tobacco cessation education, reporting that she has already quit from being in the hospital

## 2021-05-07 NOTE — DISCHARGE INSTRUCTIONS
DASH Eating Plan   WHAT YOU NEED TO KNOW:   The DASH (Dietary Approaches to Stop Hypertension) Eating Plan is designed to help prevent or lower high blood pressure  It can also help to lower LDL (bad) cholesterol and decrease your risk of heart disease  The plan is low in sodium, sugar, unhealthy fats, and total fat  It is high in potassium, calcium, magnesium, and fiber  These nutrients are added when you eat more fruits, vegetables, and whole grains  DISCHARGE INSTRUCTIONS:   Your sodium limit each day: Your dietitian will tell you how much sodium is safe for you to have each day  People with high blood pressure should have no more than 1,500 to 2,300 mg of sodium in a day  A teaspoon (tsp) of salt has 2,300 mg of sodium  This may seem like a difficult goal, but small changes to the foods you eat can make a big difference  Your healthcare provider or dietitian can help you create a meal plan that follows your sodium limit  How to limit sodium:   · Read food labels  Food labels can help you choose foods that are low in sodium  The amount of sodium is listed in milligrams (mg)  The % Daily Value (DV) column tells you how much of your daily needs are met by 1 serving of the food for each nutrient listed  Choose foods that have less than 5% of the DV of sodium  These foods are considered low in sodium  Foods that have 20% or more of the DV of sodium are considered high in sodium  Avoid foods that have more than 300 mg of sodium in each serving  Choose foods that say low-sodium, reduced-sodium, or no salt added on the food label  · Avoid salt  Do not salt food at the table, and add very little salt to foods during cooking  Use herbs and spices, such as onions, garlic, and salt-free seasonings to add flavor to foods  Try lemon or lime juice or vinegar to give foods a tart flavor  Use hot peppers or a small amount of hot pepper sauce to add a spicy flavor to foods  · Ask about salt substitutes    Ask your healthcare provider if you may use salt substitutes  Some salt substitutes have ingredients that can be harmful if you have certain health conditions  · Choose foods carefully at restaurants  Meals from restaurants, especially fast food restaurants, are often high in sodium  Some restaurants have nutrition information that tells you the amount of sodium in their foods  Ask to have your food prepared with less, or no salt  What you need to know about fats:   · Include healthy fats  Examples are unsaturated fats and omega-3 fatty acids  Unsaturated fats are found in soybean, canola, olive, or sunflower oil, and liquid and soft tub margarines  Omega-3 fatty acids are found in fatty fish, such as salmon, tuna, mackerel, and sardines  It is also found in flaxseed oil and ground flaxseed  · Avoid unhealthy fats  Do not eat unhealthy fats, such as saturated fats and trans fats  Saturated fats are found in foods that contain fat from animals  Examples are fatty meats, whole milk, butter, cream, and other dairy foods  It is also found in shortening, stick margarine, palm oil, and coconut oil  Trans fats are found in fried foods, crackers, chips, and baked goods made with margarine or shortening  Foods to include: With the DASH eating plan, you need to eat a certain number of servings from each food group  This will help you get enough of certain nutrients and limit others  The amount of servings you should eat depends on how many calories you need  Your dietitian can tell you how many calories you need  The number of servings listed next to the food groups below are for people who need about 2,000 calories each day  · Grains:  6 to 8 servings (3 of these servings should be whole-grain foods)    ? 1 slice of whole-grain bread     ? 1 ounce of dry cereal    ? ½ cup of cooked cereal, pasta, or brown rice    · Vegetables and fruits:  4 to 5 servings of fruits and 4 to 5 servings of vegetables    ?  1 medium fruit    ? ½ cup of frozen, canned (no added salt), or chopped fresh vegetables     ? ½ cup of fresh, frozen, dried, or canned fruit (canned in light syrup or fruit juice)    ? ½ cup of vegetable or fruit juice    · Dairy:  2 to 3 servings    ? 1 cup of nonfat (skim) or 1% milk    ? 1½ ounces of fat-free or low-fat cheese    ? 6 ounces of nonfat or low-fat yogurt    · Lean meat, poultry, and fish:  6 ounces or less    ? Poultry (chicken, turkey) with no skin    ? Fish (especially fatty fish, such as salmon, fresh tuna, or mackerel)    ? Lean beef and pork (loin, round, extra lean hamburger)    ? Egg whites and egg substitutes    · Nuts, seeds, and legumes:  4 to 5 servings each week    ? ½ cup of cooked beans and peas    ? 1½ ounces of unsalted nuts    ? 2 tablespoons of peanut butter or seeds    · Sweets and added sugars:  5 or less each week    ? 1 tablespoon of sugar, jelly, or jam    ? ½ cup of sorbet or gelatin    ? 1 cup of lemonade    · Fats:  2 to 3 servings each week    ? 1 teaspoon of soft margarine or vegetable oil    ? 1 tablespoon of mayonnaise    ? 2 tablespoons of salad dressing    Foods to avoid:   · Grains:      ? Baked goods, such as doughnuts, pastries, cookies, and biscuits (high in fat and sugar)    ? Mixes for cornbread and biscuits, packaged foods, such as bread stuffing, rice and pasta mixes, macaroni and cheese, and instant cereals (high in sodium)    · Fruits and vegetables:      ? Regular, canned vegetables (high in sodium)    ? Sauerkraut, pickled vegetables, and other foods prepared in brine (high in sodium)    ? Fried vegetables or vegetables in butter or high-fat sauces    ? Fruit in cream or butter sauce (high in fat)    · Dairy:      ? Whole milk, 2% milk, and cream (high in fat)    ?  Regular cheese and processed cheese (high in fat and sodium)    · Meats and protein foods:      ? Smoked or cured meat, such as corned beef, mcpherson, ham, hot dogs, and sausage (high in fat and sodium)    ? Canned beans and canned meats or spreads, such as potted meats, sardines, anchovies, and imitation seafood (high in sodium)    ? Deli or lunch meats, such as bologna, ham, turkey, and roast beef (high in sodium)    ? High-fat meat (T-bone steak, regular hamburger, and ribs)    ? Whole eggs and egg yolks (high in fat)    · Other:      ? Seasonings made with salt, such as garlic salt, celery salt, onion salt, seasoned salt, meat tenderizers, and monosodium glutamate (MSG)    ? Miso soup and canned or dried soup mixes (high in sodium)    ? Regular soy sauce, barbecue sauce, teriyaki sauce, steak sauce, Worcestershire sauce, and most flavored vinegars (high in sodium)    ? Regular condiments, such as mustard, ketchup, and salad dressings (high in sodium)    ? Gravy and sauces, such as Rene or cheese sauces (high in sodium and fat)    ? Drinks high in sugar, such as soda or fruit drinks    ? Snack foods, such as salted chips, popcorn, pretzels, pork rinds, salted crackers, and salted nuts    ? Frozen foods, such as dinners, entrees, vegetables with sauces, and breaded meats (high in sodium)    Other guidelines to follow:   · Maintain a healthy weight  Your risk for heart disease is higher if you are overweight  Your healthcare provider may suggest that you lose weight if you are overweight  You can lose weight by eating fewer calories and foods that have added sugars and fat  The DASH meal plan can help you do this  Decrease calories by eating smaller portions at each meal and fewer snacks  Ask your healthcare provider for more information about how to lose weight  · Exercise regularly  Regular exercise can help you reach or maintain a healthy weight  Regular exercise can also help decrease your blood pressure and improve your cholesterol levels  Get 30 minutes or more of moderate exercise each day of the week  To lose weight, get at least 60 minutes of exercise   Talk to your healthcare provider about the best exercise program for you  · Limit alcohol  Women should limit alcohol to 1 drink a day  Men should limit alcohol to 2 drinks a day  A drink of alcohol is 12 ounces of beer, 5 ounces of wine, or 1½ ounces of liquor  © Copyright [address] Information is for End User's use only and may not be sold, redistributed or otherwise used for commercial purposes  All illustrations and images included in CareNotes® are the copyrighted property of [employer] Inc  or [facility] Bernal   The above information is an  only  It is not intended as medical advice for individual conditions or treatments  Talk to your doctor, nurse or pharmacist before following any medical regimen to see if it is safe and effective for you  Heart Failure   WHAT YOU NEED TO KNOW:   Heart failure is a condition that does not allow your heart to fill or pump properly  Not enough oxygen in your blood gets to your organs and tissues  Fluid may not move through your body properly  Fluid builds up and causes swelling and trouble breathing  This is known as congestive heart failure  Heart failure may start in the left or right ventricle  Heart failure is often caused by damage or injury to your heart  The damage may be caused by other heart problems, diabetes, or high blood pressure  The damage may have also been caused by an infection  Heart failure is a long-term condition that tends to get worse over time  It is important to manage your health to improve your quality of life  DISCHARGE INSTRUCTIONS:   Call your local emergency number (911 in the [address] [place] [address],[address]) if:   · You have any of the following signs of a heart attack:      ?  Squeezing, pressure, or pain in your chest    ? You may  also have any of the following:     § Discomfort or pain in your back, neck, jaw, stomach, or arm    § Shortness of breath    § Nausea or vomiting    § Lightheadedness or a sudden cold sweat      Call your doctor if:   · Your heartbeat is fast, slow, or uneven all the time  · You have symptoms of worsening heart failure:      ? Shortness of breath at rest, at night, or that is getting worse in any way    ? Weight gain of 3 or more pounds (1 4 kg) in a day, or more than your healthcare provider says is okay    ? More swelling in your legs or ankles    ? Abdominal pain or swelling    ? More coughing    ? Loss of appetite    ? Feeling tired all the time    · You feel hopeless or depressed, or you have lost interest in things you used to enjoy  · You often feel worried or afraid  · You have questions or concerns about your condition or care  Medicines:   · Medicines  may be needed to help regulate your heart rhythm  You may also need medicine to lower your blood pressure, and to decrease extra fluids  · Take your medicine as directed  Contact your healthcare provider if you think your medicine is not helping or if you have side effects  Tell him of her if you are allergic to any medicine  Keep a list of the medicines, vitamins, and herbs you take  Include the amounts, and when and why you take them  Bring the list or the pill bottles to follow-up visits  Carry your medicine list with you in case of an emergency  Go to cardiac rehab if directed:  Cardiac rehab is a program run by specialists who will help you safely strengthen your heart  The program includes exercise, relaxation, stress management, and heart-healthy nutrition  Manage swelling from extra fluid:   · Elevate (raise) your legs above the level of your heart  This will help with fluid that builds up in your legs or ankles  Elevate your legs as often as possible during the day  Prop your legs on pillows or blankets to keep them elevated comfortably  Try not to stand for long periods of time during the day  Move around to keep your blood circulating  · Limit sodium (salt)  Ask how much sodium you can have each day   Your healthcare provider may give you a limit, such as 2,300 milligrams (mg) a day  Your provider or a dietitian can teach you how to read food labels for the number of mg in a food  He or she can also help you find ways to have less salt  For example, if you add salt to food as you cook, do not add more at the table  · Drink liquids as directed  You may need to limit the amount of liquid you drink within 24 hours  Your healthcare provider will tell you how much liquid to have and which liquids are best for you  He or she may tell you to limit liquid to 1 5 to 2 liters in a day  He or she will also tell you how often to drink liquid throughout the day  · Weigh yourself every morning  Use the same scale, in the same spot  Do this after you use the bathroom, but before you eat or drink  Wear the same type of clothing each time  Write down your weight and call your healthcare provider if you have a sudden weight gain  Swelling and weight gain are signs of fluid buildup  Manage heart failure: Your quality of life may improve with treatment and the following:  · Do not smoke  Nicotine and other chemicals in cigarettes and cigars can cause lung and heart damage  Ask your healthcare provider for information if you currently smoke and need help to quit  E-cigarettes or smokeless tobacco still contain nicotine  Talk to your healthcare provider before you use these products  · Do not drink alcohol or use illegal drugs  Alcohol and drugs can increase your risk for high blood pressure, diabetes, and coronary artery disease  · Eat heart-healthy foods  Heart-healthy foods include fruits, vegetables, lean meat (such as beef, chicken, or pork), and low-fat dairy products  Fatty fish such as salmon and tuna are also heart healthy  Other heart-healthy foods include walnuts, whole-grain breads, beans, and cooked beans  Replace butter and margarine with heart-healthy oils such as olive oil or canola oil   Your provider or a dietitian can help you create heart-healthy meal plans  · Manage any chronic health conditions you have  These include high blood pressure, diabetes, obesity, high cholesterol, metabolic syndrome, and COPD  You will have fewer symptoms if you manage these health conditions  Follow your healthcare provider's recommendations and follow up with him or her regularly  · Maintain a healthy weight  Being overweight can increase your risk for high blood pressure, diabetes, and coronary artery disease  These conditions can make your symptoms worse  Ask your healthcare provider how much you should weigh  Ask him or her to help you create a weight loss plan if you are overweight  · Stay active  Activity can help keep your symptoms from getting worse  Walking is a type of physical activity that helps maintain your strength and improve your mood  Physical activity also helps you manage your weight  Work with your healthcare provider to create an exercise plan that is right for you  · Get vaccines as directed  The flu and pneumonia can be severe for a person who has heart failure  Vaccines protect you from these infections  Get a flu shot every year as soon as it is recommended, usually in September or October  You may also need the pneumonia vaccine  Your healthcare provider can tell you if you need other vaccines, and when to get them  Follow up with your doctor within 7 days or as directed: You may need to return for other tests  You may need home health care  A healthcare provider will monitor your vital signs, weight, and make sure your medicines are working  Write down your questions so you remember to ask them during your visits  Join a support group:  Heart failure can be difficult to manage  It may be helpful to talk with others who have heart failure  You may learn how to better manage your condition or get emotional support   For more information:  · [address] , [place]   Phone: [phone]  Web Address: https://[employer]/  [address] [facility] 2021 Information is for End User's use only and may not be sold, redistributed or otherwise used for commercial purposes  All illustrations and images included in CareNotes® are the copyrighted property of [employer]  or [address] [place], [place] [address]   The above information is an  only  It is not intended as medical advice for individual conditions or treatments  Talk to your doctor, nurse or pharmacist before following any medical regimen to see if it is safe and effective for you

## 2021-05-07 NOTE — CASE MANAGEMENT
Pt will be discharging today  Cm s/w the patient about the life vest, pt reported to CM that she is now declining a life vest because she "does not want life saving measures taken" if something were to happen to her  Life vest was refused today  CM inquired if there is anything the pt wanted to inquire about prior to discharging today, pt reported nothing  Pt informed CM that her son will be transporting her home today  CM reminded pt that it is imperative to review the housing/shelter resources provided to her by the Cardiology CM  Pt reported that she understands and will be making several calls  CM was asked to price check Entresto medication  Cm informed that the Rennyrlyn Leaf will now be prescribed OP and the physician ordered losartan instead  CM asked the attending physician for an OP Palliative consult for the patent as she has now refused the life vest      There are no further needs at this time
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
## 2021-05-07 NOTE — PROGRESS NOTES
INTERNAL MEDICINE RESIDENCY PROGRESS NOTE     Name: Nuha Pederson   Age & Sex: 63 y o  female   MRN: [id number]  Unit/Bed#: CW2 210-02   Encounter: [id number]  Team: SOD Team A    PATIENT INFORMATION     Name: Nuha Pederson   Age & Sex: 63 y o  female   MRN: [id number]  Hospital Stay Days: 5    ASSESSMENT/PLAN     Principal Problem:    New onset of congestive heart failure ([address] )  Active Problems:    Abdominal pain    Hypertension    Smoker    Hyperlipidemia    Pulmonary nodule    Shortness of breath    Hypertensive urgency    Positive D dimer    Abnormal finding on imaging of liver    Fever      * New onset of congestive heart failure (HCC)  Assessment & Plan  Wt Readings from Last 3 Encounters:   05/03/21 69 kg (152 lb 1 9 oz)   04/20/21 56 7 kg (125 lb)   03/23/18 56 7 kg (125 lb)     5/3- Echo EF 75% Grade II diastolic Dysfunction  -Diffuse hypokinesis LA dilated, Mild MR & TR  -presented w/ volume overload had a good response w/ lasix 40mg IV  -Started on Lisinopril 5mg and Lipitor 40mg  -will hold onto B-Blocker / Aldactone at this time  -Can consider Bisoprolol if respiratory distress with other B-blocker as it is more cardioselective  -will give another Lasix 20mg IV  -Monitor I/O   -Daily weight checks  -fluctuating Cr from   9 to 1 4 to 1 24  -Nephro consult per Cards   -Cath tomorrow likely   -Hold lisinopril  -Gentle hydration Pre / Post to prevent Contrast Nephropathy  -Cath triple vessel disease non-ischemic cardiomyopathy- Recommend Medical Management  -Life Vest-refused risk vs benefit discussed, understands and acknowledges   -MRI- non-ischemic cardiomyopathy   -D/C today- follow up in clinic  Abdominal pain  Assessment & Plan  Unclear etiology  Does have findings of atherosclerotic abdominal vessels on recent CT; intermittent abdominal pain although not clearly associated with food intake    Possible early cirrhosis on recent CT scan, however no significant ascites or abdominal distention  Tolerating diet well without nausea/vomiting/diarrhea/constipation  Abdominal exam remains benign and pain continues to be episodic/intermittent with no clear cause or temporality  Plan:  · Will continue Bentyl for now, monitor for improvement  · Initiate pantoprazole 40 mg daily davin Briseno · Mesenteric duplex completed, results pending official read  · Given potential anginal equivalent, consider trialing sublingual nitroglycerin for further episodes of abdominal pain  · Suspect functional component, can trial TCA as well  -RUQ- No gallbladder findings  No intrahepatic biliary dilatation  CBD measures 6 mm  No choledocholithiasis   -Mild hepatomegaly  -  7 cm in the midclavicular line  Surface contour is smooth  Parenchyma:  Echogenicity and echotexture are within normal limits  No evidence of suspicious mass  Limited imaging of the main portal vein shows it to be patent and hepatopetal     Hypertension  Assessment & Plan  See assessment and plan under hypertensive urgency above  Chronic hepatitis C virus infection (HCC)-resolved as of 5/6/2021  Assessment & Plan  -Hep C Reactive antibody   -No hx of IVDU / Transfusion / Have not had a sexual partner for decades    -5/5 Hep C Quantitative 0    Smoker  Assessment & Plan  Admitting lung imaging does show some mild emphysema  Plan  · Tobacco cessation materials on discharge  · Nicotine patch while admitted  · Tobacco cessation strongly encouraged as patient likely has underlying baseline COPD    Fever  Assessment & Plan  - 3 episodes of 100 6 T  -denies any URI, or LUTS  -Blood Cx prelim negative  -no leukocytosis  -monitor     Abnormal finding on imaging of liver  Assessment & Plan  Possible early cirrhosis noted on CT scan obtained on arrival   Chronic hepatitis panel did show high reactivity for hep C antibodies    Plan:  · Will recommend outpatient elastography  · Will check hep C viral RNA    Positive D dimer  Assessment & Plan  Patient with a positive D-dimer from her previous admission at [facility] 10 days ago at 2 17   CT PE protocol negative  Patient's D-dimer on admission 3 09  Doubt that her dyspnea is secondary to any new pulmonary embolism  Patient is not tachycardic, no ECG findings suggesting new PE  No history of DVT or PE  Plan:  · Will plan to treat new onset heart failure as above    Hypertensive urgency  Assessment & Plan  Patient with a blood pressure of 198/111 on arrival   Lab work indicating no signs of end-organ damage  Blood pressures are improving on antihypertensive  Plan:  · Labetalol 10 mg as needed every 6 hours for SBP greater than 180  · Continue amlodipine 5 mg daily  · Monitor pressures closely    Shortness of breath  Assessment & Plan  Patient presented with shortness of breath initially concerning for heart failure etiology given radiograph and cardiac serologies, however etiology appears unclear at this time  Shortness of breath seems secondary to bouts of abdominal pain  Additionally, she remains euvolemic on exam today without any signs or symptoms concerning for heart failure  Plan:  · Will continue holding off further diuresis  · TTE obtained yesterday, await official read, however as patient did have profoundly elevated NT proBNP with signs of LVH on presenting EKG, anticipate ejection fraction to be reduced and patient to require ischemic evaluation, either in the inpatient or outpatient setting  · -See CHF    Pulmonary nodule  Assessment & Plan  5 mm pulmonary nodule in the right middle lobe identified on arrival   Patient has a long standing history of smoking, would recommend outpatient follow-up for repeat imaging  Hyperlipidemia  Assessment & Plan  Patient has not seen a physician in years  Last lipid panel in 2015 revealed a total cholesterol 230, triglycerides 163, HDL 43,   Patient is not on any medication as an outpatient    Lipid panel obtained on arrival shows total cholesterol 199, triglycerides 103, HDL 44,   Plan:  · ASCVD risk of 10 8%, can consider statin therapy as an outpatient or inpatient pending further results    MAHENDRA (acute kidney injury) (HCC)-resolved as of 2021  Assessment & Plan  -Cr 1 45 from 0 96 baseline around 1  -likely prerenal in etiology  -stable now Cr 1 24  -avoid Hypotension / NSAIDs      Disposition: D/C after LifeVest      SUBJECTIVE     Patient seen and examined  No acute events overnight  Denies any chest pain, SOB, palpitations, nausea, vomiting, fever, chills  Does not want to pursue w/ Life vest  Wants God to take over and see what happens  Feeling better since the admission  Does not have any complaints  OBJECTIVE     Vitals:    21 0241 21 0600 21 0802 21 0802   BP: 132/74  135/76 135/76   BP Location: Right arm   Right arm   Pulse: 71  70 76   Resp: 19   18   Temp: 99 3 °F (37 4 °C)   99 1 °F (37 3 °C)   TempSrc: Oral   Oral   SpO2: 99%   93%   Weight:  69 kg (152 lb 3 2 oz)     Height:          Temperature:   Temp (24hrs), Av 1 °F (37 3 °C), Min:99 °F (37 2 °C), Max:99 3 °F (37 4 °C)    Temperature: 99 1 °F (37 3 °C)  Intake & Output:  I/O        07 - / 0700  07 -  07 07 -  0700    P  O  240 1260     I V  (mL/kg) 404 3 (5 9)      Total Intake(mL/kg) 644 3 (9 4) 1260 (18 3)     Urine (mL/kg/hr) 375 (0 2)      Total Output 375      Net +269 3 +1260            Unmeasured Urine Occurrence  4 x         Weights:   IBW (Ideal Body Weight): 54 7 kg    Body mass index is 26 13 kg/m²  Weight (last 2 days) before discharge     Date/Time   Weight    21 0600   69 (152 2)    21 0536   68 9 (151 8)            Physical Exam  Constitutional:       General: She is not in acute distress  Appearance: She is not ill-appearing, toxic-appearing or diaphoretic  HENT:      Nose: No congestion or rhinorrhea     Cardiovascular:      Rate and Rhythm: Normal rate and regular rhythm  Pulses: Normal pulses  Heart sounds: Normal heart sounds  No murmur  No friction rub  No gallop  Pulmonary:      Effort: Pulmonary effort is normal  No respiratory distress  Breath sounds: Normal breath sounds  No stridor  No wheezing, rhonchi or rales  Chest:      Chest wall: No tenderness  Abdominal:      General: Bowel sounds are normal  There is no distension  Palpations: There is no mass  Tenderness: There is no abdominal tenderness  There is no right CVA tenderness, left CVA tenderness, guarding or rebound  Hernia: No hernia is present  Musculoskeletal:      Right lower leg: No edema  Left lower leg: No edema  Comments: Foot rash / dry / scaly    Neurological:      General: No focal deficit present  Mental Status: She is alert  Mental status is at baseline  Psychiatric:         Mood and Affect: Mood normal          Behavior: Behavior normal        LABORATORY DATA     Labs: I have personally reviewed pertinent reports  Results from last 7 days   Lab Units 05/07/21  0645 05/06/21  0534 05/05/21  0528   WBC Thousand/uL 7 10 7 86 9 44   HEMOGLOBIN g/dL 11 8 12 6 12 7   HEMATOCRIT % 35 7 39 0 38 9   PLATELETS Thousands/uL 341 365 398*   NEUTROS PCT % 66 59 64   MONOS PCT % 14* 15* 13*      Results from last 7 days   Lab Units 05/07/21  0645 05/06/21  0534 05/05/21  0528  05/02/21  0451 04/30/21  1408   POTASSIUM mmol/L 3 7 3 9 4 1   < > 3 5   < > 3 4*   CHLORIDE mmol/L 106 107 108   < > 104   < > 111*   CO2 mmol/L 26 28 26   < > 30   < > 24   BUN mg/dL 12 23 27*   < > 24   < > 15   CREATININE mg/dL 0 90 1 04 1 11   < > 1 13   < > 0 99   CALCIUM mg/dL 9 4 9 3 9 9   < > 9 3   < > 9 4   ALK PHOS U/L  --   --   --   --  117*  --  125*   ALT U/L  --   --   --   --  38  --  37   AST U/L  --   --   --   --  21  --  25    < > = values in this interval not displayed       Results from last 7 days   Lab Units 05/02/21 0451 05/01/21  0604 04/30/21  1408   MAGNESIUM mg/dL 2 3 2 3 2 3     Results from last 7 days   Lab Units 05/01/21  0604   PHOSPHORUS mg/dL 3 7          Results from last 7 days   Lab Units 05/04/21  0457   LACTIC ACID mmol/L 1 7     Results from last 7 days   Lab Units 04/30/21  1408   TROPONIN I ng/mL 0 03       IMAGING & DIAGNOSTIC TESTING     Radiology Results: I have personally reviewed pertinent reports  Xr Chest 1 View Portable    Result Date: 4/30/2021  Impression: Cardiomegaly with vascular congestion likely on the basis of CHF  Workstation performed: [id number]     Us Right Upper Quadrant    Result Date: 5/4/2021  Impression: Mild hepatomegaly  Stable compared to prior CT  Top normal caliber common hepatic duct without obstructing etiology identified  No filling defects  Common bile duct tapers normally through the pancreatic head  No intrahepatic ductal dilatation  Gallbladder appears within normal limits  Workstation performed: [id number]     Other Diagnostic Testing: I have personally reviewed pertinent reports      ACTIVE MEDICATIONS     Current Facility-Administered Medications   Medication Dose Route Frequency    acetaminophen (TYLENOL) tablet 650 mg  650 mg Oral Q4H PRN    acetaminophen (TYLENOL) tablet 975 mg  975 mg Oral Q6H PRN    albuterol (PROVENTIL HFA,VENTOLIN HFA) inhaler 2 puff  2 puff Inhalation Q4H PRN    atorvastatin (LIPITOR) tablet 40 mg  40 mg Oral After Dinner    calcium carbonate (TUMS) chewable tablet 1,000 mg  1,000 mg Oral Daily PRN    dicyclomine (BENTYL) tablet 20 mg  20 mg Oral TID AC    enoxaparin (LOVENOX) subcutaneous injection 40 mg  40 mg Subcutaneous Q24H WILDER    Labetalol HCl (NORMODYNE) injection 10 mg  10 mg Intravenous Q6H PRN    melatonin tablet 3 mg  3 mg Oral HS    metoprolol succinate (TOPROL-XL) 24 hr tablet 25 mg  25 mg Oral BID    nicotine (NICODERM CQ) 7 mg/24hr TD 24 hr patch 1 patch  1 patch Transdermal Daily    ondansetron (ZOFRAN) injection 4 mg  4 mg Intravenous Q6H PRN    pantoprazole (PROTONIX) EC tablet 40 mg  40 mg Oral Early Morning    sacubitril-valsartan (ENTRESTO) 24-26 MG per tablet 1 tablet  1 tablet Oral BID    senna (SENOKOT) tablet 8 6 mg  1 tablet Oral HS    simethicone (MYLICON) chewable tablet 80 mg  80 mg Oral Q6H PRN       VTE Pharmacologic Prophylaxis: Enoxaparin (Lovenox)  VTE Mechanical Prophylaxis: sequential compression device    Portions of the record may have been created with voice recognition software  Occasional wrong word or "sound a like" substitutions may have occurred due to the inherent limitations of voice recognition software    Read the chart carefully and recognize, using context, where substitutions have occurred   ==  Sandra Vasquez MD  [address] [facility]  Internal Medicine Residency PGY-1

## 2021-05-07 NOTE — CASE MANAGEMENT
Pt's son is now unable to transport patient home  CM s/w patient about a Lyft ride back to the [employer], pt confirmed that she would like a Lyft ride as she does not have money  Lyft waiver signed  Cm confirmed an 11:50am Lyft with [employer] back to pt's hotel

## 2021-05-09 NOTE — UTILIZATION REVIEW
Notification of Discharge   This is a Notification of Discharge from our facility [address]  Please be advised that this patient has been discharge from our facility  Below you will find the admission and discharge date and time including the patients disposition  UTILIZATION REVIEW CONTACT:  Leslie Clamp  Utilization   Network Utilization Review Department  Phone: [phone] x carefully listen to the prompts  All voicemails are confidential   Email: [email or web address]     PHYSICIAN ADVISORY SERVICES:  FOR [id number] REVIEW - MEDICAL NECESSITY DENIAL  Phone: [phone]  Fax: [phone]  Email: [email or web address]     PRESENTATION DATE: 4/30/2021  1:21 PM    INPATIENT ADMISSION DATE: 5/2/21 1008   DISCHARGE DATE: 5/7/2021 11:50 AM  DISPOSITION: Home/Self Care Home/Self Care      IMPORTANT INFORMATION:  Send all requests for admission clinical reviews, approved or denied determinations and any other requests to dedicated fax number below belonging to the campus where the patient is receiving treatment   List of dedicated fax numbers:  [address] DENIALS (Administrative/Medical Necessity) [phone]   [address] (Maternity/NICU/Pediatrics) [phone]   \[facility]\"" [phone]   Esha Lawton [phone]   Darrick Lundberg [phone]    [address] [phone]   [facility]  [phone]   [address] [facility]  [address] [phone]

## 2021-05-10 LAB
BACTERIA BLD CULT: NORMAL
BACTERIA BLD CULT: NORMAL

## 2021-05-20 ENCOUNTER — TELEPHONE (OUTPATIENT)
Dept: PALLIATIVE MEDICINE | Facility: CLINIC | Age: 61
End: 2021-05-20

## 2021-05-20 NOTE — TELEPHONE ENCOUNTER
x2 attempt   Left a message for patient to call office back to schedule an appointment with Palliative Care

## 2021-05-27 DIAGNOSIS — R10.9 ABDOMINAL PAIN, UNSPECIFIED ABDOMINAL LOCATION: ICD-10-CM

## 2021-05-27 DIAGNOSIS — I50.9 NEW ONSET OF CONGESTIVE HEART FAILURE (HCC): ICD-10-CM

## 2021-05-28 DIAGNOSIS — E78.5 HYPERLIPIDEMIA, UNSPECIFIED HYPERLIPIDEMIA TYPE: ICD-10-CM

## 2021-05-28 DIAGNOSIS — I10 HYPERTENSION: ICD-10-CM

## 2021-05-28 DIAGNOSIS — I50.9 NEW ONSET OF CONGESTIVE HEART FAILURE (HCC): ICD-10-CM

## 2021-05-29 RX ORDER — LOSARTAN POTASSIUM 50 MG/1
TABLET ORAL
Qty: 30 TABLET | Refills: 0 | Status: SHIPPED | OUTPATIENT
Start: 2021-05-29 | End: 2022-01-10 | Stop reason: HOSPADM

## 2021-05-29 RX ORDER — ATORVASTATIN CALCIUM 40 MG/1
40 TABLET, FILM COATED ORAL
Qty: 30 TABLET | Refills: 0 | Status: SHIPPED | OUTPATIENT
Start: 2021-05-29 | End: 2022-01-10 | Stop reason: HOSPADM

## 2021-05-29 RX ORDER — METOPROLOL SUCCINATE 25 MG/1
TABLET, EXTENDED RELEASE ORAL
Qty: 60 TABLET | Refills: 0 | Status: SHIPPED | OUTPATIENT
Start: 2021-05-29 | End: 2022-01-10 | Stop reason: HOSPADM

## 2021-05-31 RX ORDER — DICYCLOMINE HCL 20 MG
20 TABLET ORAL
Qty: 90 TABLET | Refills: 0 | Status: SHIPPED | OUTPATIENT
Start: 2021-05-31 | End: 2022-01-10 | Stop reason: HOSPADM

## 2021-05-31 RX ORDER — FUROSEMIDE 20 MG/1
TABLET ORAL
Qty: 30 TABLET | Refills: 0 | Status: SHIPPED | OUTPATIENT
Start: 2021-05-31 | End: 2022-01-10 | Stop reason: HOSPADM

## 2022-01-05 ENCOUNTER — APPOINTMENT (EMERGENCY)
Dept: RADIOLOGY | Facility: HOSPITAL | Age: 62
DRG: 194 | End: 2022-01-05
Payer: MEDICARE

## 2022-01-05 ENCOUNTER — HOSPITAL ENCOUNTER (INPATIENT)
Facility: HOSPITAL | Age: 62
LOS: 4 days | Discharge: HOME | DRG: 194 | End: 2022-01-10
Attending: EMERGENCY MEDICINE | Admitting: INTERNAL MEDICINE
Payer: MEDICARE

## 2022-01-05 DIAGNOSIS — R06.00 DOE (DYSPNEA ON EXERTION): ICD-10-CM

## 2022-01-05 DIAGNOSIS — I50.9 NEW ONSET OF CONGESTIVE HEART FAILURE (HCC): ICD-10-CM

## 2022-01-05 DIAGNOSIS — R06.02 SHORTNESS OF BREATH: ICD-10-CM

## 2022-01-05 DIAGNOSIS — R10.9 ABDOMINAL PAIN, UNSPECIFIED ABDOMINAL LOCATION: ICD-10-CM

## 2022-01-05 DIAGNOSIS — I42.8 NONISCHEMIC CARDIOMYOPATHY (HCC): ICD-10-CM

## 2022-01-05 DIAGNOSIS — I50.9 HEART FAILURE (HCC): Primary | ICD-10-CM

## 2022-01-05 DIAGNOSIS — M25.50 ARTHRALGIA OF MULTIPLE JOINTS: ICD-10-CM

## 2022-01-05 DIAGNOSIS — I50.20 HFREF (HEART FAILURE WITH REDUCED EJECTION FRACTION) (HCC): ICD-10-CM

## 2022-01-05 DIAGNOSIS — E78.5 HYPERLIPIDEMIA, UNSPECIFIED HYPERLIPIDEMIA TYPE: ICD-10-CM

## 2022-01-05 DIAGNOSIS — E87.6 HYPOKALEMIA: ICD-10-CM

## 2022-01-05 DIAGNOSIS — M13.0 POLYARTICULAR ARTHRITIS: ICD-10-CM

## 2022-01-05 DIAGNOSIS — I50.43 ACUTE ON CHRONIC COMBINED SYSTOLIC AND DIASTOLIC CONGESTIVE HEART FAILURE (HCC): ICD-10-CM

## 2022-01-05 DIAGNOSIS — U07.1 COVID-19: ICD-10-CM

## 2022-01-05 PROBLEM — I21.A1 TYPE 2 MI (MYOCARDIAL INFARCTION) (HCC): Status: ACTIVE | Noted: 2022-01-05

## 2022-01-05 LAB
ALBUMIN SERPL BCP-MCNC: 3.7 G/DL (ref 3.5–5)
ALP SERPL-CCNC: 92 U/L (ref 46–116)
ALT SERPL W P-5'-P-CCNC: 31 U/L (ref 12–78)
ANION GAP SERPL CALCULATED.3IONS-SCNC: 9 MMOL/L (ref 4–13)
AST SERPL W P-5'-P-CCNC: 26 U/L (ref 5–45)
BASOPHILS # BLD AUTO: 0.02 THOUSANDS/ΜL (ref 0–0.1)
BASOPHILS NFR BLD AUTO: 0 % (ref 0–1)
BILIRUB SERPL-MCNC: 1.35 MG/DL (ref 0.2–1)
BUN SERPL-MCNC: 12 MG/DL (ref 5–25)
CALCIUM SERPL-MCNC: 9.8 MG/DL (ref 8.3–10.1)
CARDIAC TROPONIN I PNL SERPL HS: 128 NG/L
CHLORIDE SERPL-SCNC: 107 MMOL/L (ref 100–108)
CO2 SERPL-SCNC: 24 MMOL/L (ref 21–32)
CREAT SERPL-MCNC: 0.93 MG/DL (ref 0.6–1.3)
EOSINOPHIL # BLD AUTO: 0.07 THOUSAND/ΜL (ref 0–0.61)
EOSINOPHIL NFR BLD AUTO: 1 % (ref 0–6)
ERYTHROCYTE [DISTWIDTH] IN BLOOD BY AUTOMATED COUNT: 13.3 % (ref 11.6–15.1)
FLUAV RNA RESP QL NAA+PROBE: NEGATIVE
FLUBV RNA RESP QL NAA+PROBE: NEGATIVE
GFR SERPL CREATININE-BSD FRML MDRD: 67 ML/MIN/1.73SQ M
GLUCOSE SERPL-MCNC: 104 MG/DL (ref 65–140)
HCT VFR BLD AUTO: 39.8 % (ref 34.8–46.1)
HGB BLD-MCNC: 13.5 G/DL (ref 11.5–15.4)
IMM GRANULOCYTES # BLD AUTO: 0.02 THOUSAND/UL (ref 0–0.2)
IMM GRANULOCYTES NFR BLD AUTO: 0 % (ref 0–2)
LYMPHOCYTES # BLD AUTO: 1.68 THOUSANDS/ΜL (ref 0.6–4.47)
LYMPHOCYTES NFR BLD AUTO: 25 % (ref 14–44)
MCH RBC QN AUTO: 30.1 PG (ref 26.8–34.3)
MCHC RBC AUTO-ENTMCNC: 33.9 G/DL (ref 31.4–37.4)
MCV RBC AUTO: 89 FL (ref 82–98)
MONOCYTES # BLD AUTO: 0.66 THOUSAND/ΜL (ref 0.17–1.22)
MONOCYTES NFR BLD AUTO: 10 % (ref 4–12)
NEUTROPHILS # BLD AUTO: 4.42 THOUSANDS/ΜL (ref 1.85–7.62)
NEUTS SEG NFR BLD AUTO: 64 % (ref 43–75)
NRBC BLD AUTO-RTO: 0 /100 WBCS
NT-PROBNP SERPL-MCNC: 6804 PG/ML
PLATELET # BLD AUTO: 220 THOUSANDS/UL (ref 149–390)
PMV BLD AUTO: 11.2 FL (ref 8.9–12.7)
POTASSIUM SERPL-SCNC: 3.2 MMOL/L (ref 3.5–5.3)
PROT SERPL-MCNC: 8.2 G/DL (ref 6.4–8.2)
RBC # BLD AUTO: 4.48 MILLION/UL (ref 3.81–5.12)
RSV RNA RESP QL NAA+PROBE: NEGATIVE
SARS-COV-2 RNA RESP QL NAA+PROBE: POSITIVE
SODIUM SERPL-SCNC: 140 MMOL/L (ref 136–145)
WBC # BLD AUTO: 6.87 THOUSAND/UL (ref 4.31–10.16)

## 2022-01-05 PROCEDURE — 96374 THER/PROPH/DIAG INJ IV PUSH: CPT

## 2022-01-05 PROCEDURE — 71045 X-RAY EXAM CHEST 1 VIEW: CPT

## 2022-01-05 PROCEDURE — 99226 PR SBSQ OBSERVATION CARE/DAY 35 MINUTES: CPT | Performed by: INTERNAL MEDICINE

## 2022-01-05 PROCEDURE — 85025 COMPLETE CBC W/AUTO DIFF WBC: CPT | Performed by: EMERGENCY MEDICINE

## 2022-01-05 PROCEDURE — 99285 EMERGENCY DEPT VISIT HI MDM: CPT | Performed by: EMERGENCY MEDICINE

## 2022-01-05 PROCEDURE — 94644 CONT INHLJ TX 1ST HOUR: CPT

## 2022-01-05 PROCEDURE — 83880 ASSAY OF NATRIURETIC PEPTIDE: CPT | Performed by: EMERGENCY MEDICINE

## 2022-01-05 PROCEDURE — 84484 ASSAY OF TROPONIN QUANT: CPT | Performed by: EMERGENCY MEDICINE

## 2022-01-05 PROCEDURE — 86618 LYME DISEASE ANTIBODY: CPT | Performed by: EMERGENCY MEDICINE

## 2022-01-05 PROCEDURE — 36415 COLL VENOUS BLD VENIPUNCTURE: CPT | Performed by: EMERGENCY MEDICINE

## 2022-01-05 PROCEDURE — 94760 N-INVAS EAR/PLS OXIMETRY 1: CPT

## 2022-01-05 PROCEDURE — NC001 PR NO CHARGE: Performed by: INTERNAL MEDICINE

## 2022-01-05 PROCEDURE — 99285 EMERGENCY DEPT VISIT HI MDM: CPT

## 2022-01-05 PROCEDURE — 80053 COMPREHEN METABOLIC PANEL: CPT | Performed by: EMERGENCY MEDICINE

## 2022-01-05 PROCEDURE — 0241U HB NFCT DS VIR RESP RNA 4 TRGT: CPT | Performed by: EMERGENCY MEDICINE

## 2022-01-05 PROCEDURE — 93005 ELECTROCARDIOGRAM TRACING: CPT

## 2022-01-05 RX ORDER — ALBUTEROL SULFATE 90 UG/1
2 AEROSOL, METERED RESPIRATORY (INHALATION) EVERY 4 HOURS PRN
Status: DISCONTINUED | OUTPATIENT
Start: 2022-01-05 | End: 2022-01-10 | Stop reason: HOSPADM

## 2022-01-05 RX ORDER — FUROSEMIDE 10 MG/ML
20 INJECTION INTRAMUSCULAR; INTRAVENOUS
Status: DISCONTINUED | OUTPATIENT
Start: 2022-01-06 | End: 2022-01-06

## 2022-01-05 RX ORDER — FUROSEMIDE 10 MG/ML
20 INJECTION INTRAMUSCULAR; INTRAVENOUS ONCE
Status: COMPLETED | OUTPATIENT
Start: 2022-01-05 | End: 2022-01-05

## 2022-01-05 RX ORDER — ATORVASTATIN CALCIUM 40 MG/1
40 TABLET, FILM COATED ORAL
Status: DISCONTINUED | OUTPATIENT
Start: 2022-01-06 | End: 2022-01-10 | Stop reason: HOSPADM

## 2022-01-05 RX ORDER — ACETAMINOPHEN 325 MG/1
650 TABLET ORAL ONCE
Status: COMPLETED | OUTPATIENT
Start: 2022-01-05 | End: 2022-01-05

## 2022-01-05 RX ORDER — ONDANSETRON 2 MG/ML
4 INJECTION INTRAMUSCULAR; INTRAVENOUS EVERY 6 HOURS PRN
Status: DISCONTINUED | OUTPATIENT
Start: 2022-01-05 | End: 2022-01-10 | Stop reason: HOSPADM

## 2022-01-05 RX ORDER — LOSARTAN POTASSIUM 50 MG/1
50 TABLET ORAL DAILY
Status: DISCONTINUED | OUTPATIENT
Start: 2022-01-06 | End: 2022-01-10 | Stop reason: HOSPADM

## 2022-01-05 RX ORDER — SODIUM CHLORIDE FOR INHALATION 0.9 %
3 VIAL, NEBULIZER (ML) INHALATION ONCE
Status: COMPLETED | OUTPATIENT
Start: 2022-01-05 | End: 2022-01-05

## 2022-01-05 RX ORDER — METOPROLOL SUCCINATE 25 MG/1
25 TABLET, EXTENDED RELEASE ORAL 2 TIMES DAILY
Status: DISCONTINUED | OUTPATIENT
Start: 2022-01-06 | End: 2022-01-06

## 2022-01-05 RX ORDER — KETOROLAC TROMETHAMINE 30 MG/ML
15 INJECTION, SOLUTION INTRAMUSCULAR; INTRAVENOUS ONCE
Status: COMPLETED | OUTPATIENT
Start: 2022-01-05 | End: 2022-01-05

## 2022-01-05 RX ORDER — ACETAMINOPHEN 325 MG/1
650 TABLET ORAL EVERY 6 HOURS PRN
Status: DISCONTINUED | OUTPATIENT
Start: 2022-01-05 | End: 2022-01-07

## 2022-01-05 RX ADMIN — ALBUTEROL SULFATE 10 MG: 2.5 SOLUTION RESPIRATORY (INHALATION) at 21:10

## 2022-01-05 RX ADMIN — FUROSEMIDE 20 MG: 10 INJECTION, SOLUTION INTRAVENOUS at 21:14

## 2022-01-05 RX ADMIN — ISODIUM CHLORIDE 3 ML: 0.03 SOLUTION RESPIRATORY (INHALATION) at 21:10

## 2022-01-05 RX ADMIN — IPRATROPIUM BROMIDE 1 MG: 0.5 SOLUTION RESPIRATORY (INHALATION) at 21:09

## 2022-01-05 RX ADMIN — ACETAMINOPHEN 650 MG: 325 TABLET, FILM COATED ORAL at 23:15

## 2022-01-05 RX ADMIN — KETOROLAC TROMETHAMINE 15 MG: 30 INJECTION, SOLUTION INTRAMUSCULAR at 23:14

## 2022-01-05 NOTE — Clinical Note
Case was discussed with Dr Jaye Romero and the patient's admission status was agreed to be Admission Status: inpatient status to the service of Dr Soumya Pace

## 2022-01-06 ENCOUNTER — APPOINTMENT (OUTPATIENT)
Dept: NON INVASIVE DIAGNOSTICS | Facility: HOSPITAL | Age: 62
DRG: 194 | End: 2022-01-06
Payer: MEDICARE

## 2022-01-06 ENCOUNTER — APPOINTMENT (OUTPATIENT)
Dept: RADIOLOGY | Facility: HOSPITAL | Age: 62
DRG: 194 | End: 2022-01-06
Payer: MEDICARE

## 2022-01-06 PROBLEM — M13.0 POLYARTICULAR ARTHRITIS: Status: ACTIVE | Noted: 2022-01-06

## 2022-01-06 PROBLEM — M25.50 ARTHRALGIA OF MULTIPLE JOINTS: Status: ACTIVE | Noted: 2022-01-06

## 2022-01-06 PROBLEM — M25.50 ARTHRALGIA OF MULTIPLE JOINTS: Chronic | Status: ACTIVE | Noted: 2022-01-06

## 2022-01-06 LAB
2HR DELTA HS TROPONIN: -18 NG/L
4HR DELTA HS TROPONIN: -22 NG/L
ANION GAP SERPL CALCULATED.3IONS-SCNC: 7 MMOL/L (ref 4–13)
APICAL FOUR CHAMBER EJECTION FRACTION: 21 %
ATRIAL RATE: 80 BPM
BASOPHILS # BLD AUTO: 0.02 THOUSANDS/ΜL (ref 0–0.1)
BASOPHILS NFR BLD AUTO: 0 % (ref 0–1)
BUN SERPL-MCNC: 16 MG/DL (ref 5–25)
CALCIUM SERPL-MCNC: 8.8 MG/DL (ref 8.3–10.1)
CARDIAC TROPONIN I PNL SERPL HS: 106 NG/L
CARDIAC TROPONIN I PNL SERPL HS: 110 NG/L
CHLORIDE SERPL-SCNC: 106 MMOL/L (ref 100–108)
CK SERPL-CCNC: 40 U/L (ref 26–192)
CO2 SERPL-SCNC: 28 MMOL/L (ref 21–32)
CREAT SERPL-MCNC: 1 MG/DL (ref 0.6–1.3)
CRP SERPL QL: 15 MG/L
E WAVE DECELERATION TIME: 149 MS
EOSINOPHIL # BLD AUTO: 0.05 THOUSAND/ΜL (ref 0–0.61)
EOSINOPHIL NFR BLD AUTO: 1 % (ref 0–6)
ERYTHROCYTE [DISTWIDTH] IN BLOOD BY AUTOMATED COUNT: 13.2 % (ref 11.6–15.1)
GFR SERPL CREATININE-BSD FRML MDRD: 61 ML/MIN/1.73SQ M
GLUCOSE SERPL-MCNC: 123 MG/DL (ref 65–140)
HBV CORE AB SER QL: ABNORMAL
HBV CORE IGM SER QL: ABNORMAL
HBV SURFACE AG SER QL: ABNORMAL
HCT VFR BLD AUTO: 36.7 % (ref 34.8–46.1)
HCV AB SER QL: ABNORMAL
HGB BLD-MCNC: 12.2 G/DL (ref 11.5–15.4)
IMM GRANULOCYTES # BLD AUTO: 0.01 THOUSAND/UL (ref 0–0.2)
IMM GRANULOCYTES NFR BLD AUTO: 0 % (ref 0–2)
IVC: 24 MM
LEFT ATRIUM AREA SYSTOLE SINGLE PLANE A4C: 32.8 CM2
LEFT VENTRICLE DIASTOLIC VOLUME (MOD BIPLANE): 232 ML
LEFT VENTRICLE SYSTOLIC VOLUME (MOD BIPLANE): 180 ML
LV EF: 22 %
LYMPHOCYTES # BLD AUTO: 1.41 THOUSANDS/ΜL (ref 0.6–4.47)
LYMPHOCYTES NFR BLD AUTO: 26 % (ref 14–44)
MAGNESIUM SERPL-MCNC: 2.1 MG/DL (ref 1.6–2.6)
MAGNESIUM SERPL-MCNC: 2.5 MG/DL (ref 1.6–2.6)
MCH RBC QN AUTO: 29.9 PG (ref 26.8–34.3)
MCHC RBC AUTO-ENTMCNC: 33.2 G/DL (ref 31.4–37.4)
MCV RBC AUTO: 90 FL (ref 82–98)
MONOCYTES # BLD AUTO: 0.54 THOUSAND/ΜL (ref 0.17–1.22)
MONOCYTES NFR BLD AUTO: 10 % (ref 4–12)
MV E'TISSUE VEL-SEP: 4 CM/S
MV PEAK A VEL: 0.22 M/S
MV PEAK E VEL: 93 CM/S
MV STENOSIS PRESSURE HALF TIME: 0 MS
NEUTROPHILS # BLD AUTO: 3.5 THOUSANDS/ΜL (ref 1.85–7.62)
NEUTS SEG NFR BLD AUTO: 63 % (ref 43–75)
NRBC BLD AUTO-RTO: 0 /100 WBCS
P AXIS: 13 DEGREES
PHOSPHATE SERPL-MCNC: 3.3 MG/DL (ref 2.7–4.5)
PLATELET # BLD AUTO: 210 THOUSANDS/UL (ref 149–390)
PMV BLD AUTO: 11.1 FL (ref 8.9–12.7)
POTASSIUM SERPL-SCNC: 2.8 MMOL/L (ref 3.5–5.3)
POTASSIUM SERPL-SCNC: 3.4 MMOL/L (ref 3.5–5.3)
PR INTERVAL: 136 MS
PROCALCITONIN SERPL-MCNC: <0.05 NG/ML
PROCALCITONIN SERPL-MCNC: <0.05 NG/ML
QRS AXIS: 67 DEGREES
QRSD INTERVAL: 102 MS
QT INTERVAL: 400 MS
QTC INTERVAL: 461 MS
RA PRESSURE ESTIMATED: 15 MMHG
RBC # BLD AUTO: 4.08 MILLION/UL (ref 3.81–5.12)
RIGHT ATRIUM AREA SYSTOLE A4C: 23.4 CM2
RIGHT VENTRICLE ID DIMENSION: 4.5 CM
RV PSP: 54 MMHG
SL CV LV EF: 25
SODIUM SERPL-SCNC: 141 MMOL/L (ref 136–145)
T WAVE AXIS: -89 DEGREES
TR MAX PG: 39 MMHG
TRICUSPID VALVE PEAK REGURGITATION VELOCITY: 3.14 M/S
TRICUSPID VALVE S': 0.9 CM/S
TSH SERPL DL<=0.05 MIU/L-ACNC: 1.52 UIU/ML (ref 0.36–3.74)
TV PEAK GRADIENT: 39 MMHG
URATE SERPL-MCNC: 3.7 MG/DL (ref 2–6.8)
VENTRICULAR RATE: 80 BPM
WBC # BLD AUTO: 5.53 THOUSAND/UL (ref 4.31–10.16)

## 2022-01-06 PROCEDURE — 87389 HIV-1 AG W/HIV-1&-2 AB AG IA: CPT | Performed by: STUDENT IN AN ORGANIZED HEALTH CARE EDUCATION/TRAINING PROGRAM

## 2022-01-06 PROCEDURE — 73130 X-RAY EXAM OF HAND: CPT

## 2022-01-06 PROCEDURE — 84100 ASSAY OF PHOSPHORUS: CPT | Performed by: STUDENT IN AN ORGANIZED HEALTH CARE EDUCATION/TRAINING PROGRAM

## 2022-01-06 PROCEDURE — 86704 HEP B CORE ANTIBODY TOTAL: CPT | Performed by: STUDENT IN AN ORGANIZED HEALTH CARE EDUCATION/TRAINING PROGRAM

## 2022-01-06 PROCEDURE — 93321 DOPPLER ECHO F-UP/LMTD STD: CPT | Performed by: INTERNAL MEDICINE

## 2022-01-06 PROCEDURE — 73110 X-RAY EXAM OF WRIST: CPT

## 2022-01-06 PROCEDURE — 84550 ASSAY OF BLOOD/URIC ACID: CPT | Performed by: STUDENT IN AN ORGANIZED HEALTH CARE EDUCATION/TRAINING PROGRAM

## 2022-01-06 PROCEDURE — 93308 TTE F-UP OR LMTD: CPT | Performed by: INTERNAL MEDICINE

## 2022-01-06 PROCEDURE — 80048 BASIC METABOLIC PNL TOTAL CA: CPT | Performed by: STUDENT IN AN ORGANIZED HEALTH CARE EDUCATION/TRAINING PROGRAM

## 2022-01-06 PROCEDURE — 93321 DOPPLER ECHO F-UP/LMTD STD: CPT

## 2022-01-06 PROCEDURE — 83735 ASSAY OF MAGNESIUM: CPT | Performed by: STUDENT IN AN ORGANIZED HEALTH CARE EDUCATION/TRAINING PROGRAM

## 2022-01-06 PROCEDURE — 99215 OFFICE O/P EST HI 40 MIN: CPT | Performed by: INTERNAL MEDICINE

## 2022-01-06 PROCEDURE — 93325 DOPPLER ECHO COLOR FLOW MAPG: CPT

## 2022-01-06 PROCEDURE — 84145 PROCALCITONIN (PCT): CPT | Performed by: STUDENT IN AN ORGANIZED HEALTH CARE EDUCATION/TRAINING PROGRAM

## 2022-01-06 PROCEDURE — 36415 COLL VENOUS BLD VENIPUNCTURE: CPT | Performed by: STUDENT IN AN ORGANIZED HEALTH CARE EDUCATION/TRAINING PROGRAM

## 2022-01-06 PROCEDURE — 85025 COMPLETE CBC W/AUTO DIFF WBC: CPT | Performed by: STUDENT IN AN ORGANIZED HEALTH CARE EDUCATION/TRAINING PROGRAM

## 2022-01-06 PROCEDURE — 73562 X-RAY EXAM OF KNEE 3: CPT

## 2022-01-06 PROCEDURE — 93010 ELECTROCARDIOGRAM REPORT: CPT | Performed by: INTERNAL MEDICINE

## 2022-01-06 PROCEDURE — 87340 HEPATITIS B SURFACE AG IA: CPT | Performed by: STUDENT IN AN ORGANIZED HEALTH CARE EDUCATION/TRAINING PROGRAM

## 2022-01-06 PROCEDURE — 86140 C-REACTIVE PROTEIN: CPT | Performed by: STUDENT IN AN ORGANIZED HEALTH CARE EDUCATION/TRAINING PROGRAM

## 2022-01-06 PROCEDURE — NC001 PR NO CHARGE: Performed by: INTERNAL MEDICINE

## 2022-01-06 PROCEDURE — 84443 ASSAY THYROID STIM HORMONE: CPT | Performed by: STUDENT IN AN ORGANIZED HEALTH CARE EDUCATION/TRAINING PROGRAM

## 2022-01-06 PROCEDURE — 86705 HEP B CORE ANTIBODY IGM: CPT | Performed by: STUDENT IN AN ORGANIZED HEALTH CARE EDUCATION/TRAINING PROGRAM

## 2022-01-06 PROCEDURE — 82550 ASSAY OF CK (CPK): CPT | Performed by: STUDENT IN AN ORGANIZED HEALTH CARE EDUCATION/TRAINING PROGRAM

## 2022-01-06 PROCEDURE — 93325 DOPPLER ECHO COLOR FLOW MAPG: CPT | Performed by: INTERNAL MEDICINE

## 2022-01-06 PROCEDURE — 73610 X-RAY EXAM OF ANKLE: CPT

## 2022-01-06 PROCEDURE — 86803 HEPATITIS C AB TEST: CPT | Performed by: STUDENT IN AN ORGANIZED HEALTH CARE EDUCATION/TRAINING PROGRAM

## 2022-01-06 PROCEDURE — 84484 ASSAY OF TROPONIN QUANT: CPT | Performed by: STUDENT IN AN ORGANIZED HEALTH CARE EDUCATION/TRAINING PROGRAM

## 2022-01-06 PROCEDURE — 80307 DRUG TEST PRSMV CHEM ANLYZR: CPT | Performed by: PHYSICIAN ASSISTANT

## 2022-01-06 PROCEDURE — 84132 ASSAY OF SERUM POTASSIUM: CPT | Performed by: PHYSICIAN ASSISTANT

## 2022-01-06 PROCEDURE — 93308 TTE F-UP OR LMTD: CPT

## 2022-01-06 PROCEDURE — 86747 PARVOVIRUS ANTIBODY: CPT | Performed by: STUDENT IN AN ORGANIZED HEALTH CARE EDUCATION/TRAINING PROGRAM

## 2022-01-06 RX ORDER — LIDOCAINE 50 MG/G
1 PATCH TOPICAL DAILY
Status: COMPLETED | OUTPATIENT
Start: 2022-01-06 | End: 2022-01-08

## 2022-01-06 RX ORDER — LABETALOL 20 MG/4 ML (5 MG/ML) INTRAVENOUS SYRINGE
5 EVERY 6 HOURS PRN
Status: DISCONTINUED | OUTPATIENT
Start: 2022-01-06 | End: 2022-01-10 | Stop reason: HOSPADM

## 2022-01-06 RX ORDER — POTASSIUM CHLORIDE 14.9 MG/ML
20 INJECTION INTRAVENOUS ONCE
Status: COMPLETED | OUTPATIENT
Start: 2022-01-06 | End: 2022-01-06

## 2022-01-06 RX ORDER — DICYCLOMINE HCL 20 MG
20 TABLET ORAL
Status: DISCONTINUED | OUTPATIENT
Start: 2022-01-06 | End: 2022-01-10 | Stop reason: HOSPADM

## 2022-01-06 RX ORDER — POTASSIUM CHLORIDE 20 MEQ/1
40 TABLET, EXTENDED RELEASE ORAL ONCE
Status: COMPLETED | OUTPATIENT
Start: 2022-01-06 | End: 2022-01-06

## 2022-01-06 RX ORDER — FUROSEMIDE 10 MG/ML
20 INJECTION INTRAMUSCULAR; INTRAVENOUS
Status: DISCONTINUED | OUTPATIENT
Start: 2022-01-06 | End: 2022-01-08

## 2022-01-06 RX ORDER — POTASSIUM CHLORIDE 14.9 MG/ML
20 INJECTION INTRAVENOUS ONCE
Status: DISCONTINUED | OUTPATIENT
Start: 2022-01-06 | End: 2022-01-06

## 2022-01-06 RX ORDER — MAGNESIUM SULFATE HEPTAHYDRATE 40 MG/ML
2 INJECTION, SOLUTION INTRAVENOUS ONCE
Status: COMPLETED | OUTPATIENT
Start: 2022-01-06 | End: 2022-01-06

## 2022-01-06 RX ORDER — POTASSIUM CHLORIDE 20 MEQ/1
20 TABLET, EXTENDED RELEASE ORAL 2 TIMES DAILY
Status: DISCONTINUED | OUTPATIENT
Start: 2022-01-06 | End: 2022-01-10 | Stop reason: HOSPADM

## 2022-01-06 RX ORDER — LIDOCAINE HYDROCHLORIDE 10 MG/ML
10 INJECTION, SOLUTION EPIDURAL; INFILTRATION; INTRACAUDAL; PERINEURAL ONCE
Status: COMPLETED | OUTPATIENT
Start: 2022-01-06 | End: 2022-01-06

## 2022-01-06 RX ADMIN — DICLOFENAC SODIUM 2 G: 10 GEL TOPICAL at 17:27

## 2022-01-06 RX ADMIN — POTASSIUM CHLORIDE 20 MEQ: 14.9 INJECTION, SOLUTION INTRAVENOUS at 08:08

## 2022-01-06 RX ADMIN — LIDOCAINE 5% 1 PATCH: 700 PATCH TOPICAL at 01:34

## 2022-01-06 RX ADMIN — DICLOFENAC SODIUM 2 G: 10 GEL TOPICAL at 21:23

## 2022-01-06 RX ADMIN — ENOXAPARIN SODIUM 40 MG: 40 INJECTION SUBCUTANEOUS at 08:08

## 2022-01-06 RX ADMIN — DICLOFENAC SODIUM 2 G: 10 GEL TOPICAL at 01:36

## 2022-01-06 RX ADMIN — DICYCLOMINE HYDROCHLORIDE 20 MG: 20 TABLET ORAL at 13:52

## 2022-01-06 RX ADMIN — POTASSIUM CHLORIDE 40 MEQ: 1500 TABLET, EXTENDED RELEASE ORAL at 13:52

## 2022-01-06 RX ADMIN — DICYCLOMINE HYDROCHLORIDE 20 MG: 20 TABLET ORAL at 21:22

## 2022-01-06 RX ADMIN — ATORVASTATIN CALCIUM 40 MG: 40 TABLET, FILM COATED ORAL at 17:24

## 2022-01-06 RX ADMIN — DICLOFENAC SODIUM 2 G: 10 GEL TOPICAL at 08:08

## 2022-01-06 RX ADMIN — ACETAMINOPHEN 650 MG: 325 TABLET, FILM COATED ORAL at 09:35

## 2022-01-06 RX ADMIN — POTASSIUM CHLORIDE 20 MEQ: 14.9 INJECTION, SOLUTION INTRAVENOUS at 03:38

## 2022-01-06 RX ADMIN — FUROSEMIDE 20 MG: 10 INJECTION, SOLUTION INTRAVENOUS at 08:07

## 2022-01-06 RX ADMIN — NICOTINE 1 PATCH: 7 PATCH, EXTENDED RELEASE TRANSDERMAL at 08:25

## 2022-01-06 RX ADMIN — DICYCLOMINE HYDROCHLORIDE 20 MG: 20 TABLET ORAL at 17:27

## 2022-01-06 RX ADMIN — ALBUTEROL SULFATE 2 PUFF: 90 AEROSOL, METERED RESPIRATORY (INHALATION) at 17:34

## 2022-01-06 RX ADMIN — POTASSIUM CHLORIDE 20 MEQ: 14.9 INJECTION, SOLUTION INTRAVENOUS at 05:35

## 2022-01-06 RX ADMIN — PERFLUTREN 0.6 ML/MIN: 6.52 INJECTION, SUSPENSION INTRAVENOUS at 09:10

## 2022-01-06 RX ADMIN — MAGNESIUM SULFATE HEPTAHYDRATE 2 G: 40 INJECTION, SOLUTION INTRAVENOUS at 01:38

## 2022-01-06 RX ADMIN — LOSARTAN POTASSIUM 50 MG: 50 TABLET, FILM COATED ORAL at 08:07

## 2022-01-06 RX ADMIN — FUROSEMIDE 20 MG: 10 INJECTION, SOLUTION INTRAMUSCULAR; INTRAVENOUS at 16:39

## 2022-01-06 RX ADMIN — DICYCLOMINE HYDROCHLORIDE 20 MG: 20 TABLET ORAL at 08:07

## 2022-01-06 RX ADMIN — LIDOCAINE HYDROCHLORIDE 10 ML: 10 INJECTION, SOLUTION EPIDURAL; INFILTRATION; INTRACAUDAL at 13:52

## 2022-01-06 RX ADMIN — POTASSIUM CHLORIDE 20 MEQ: 1500 TABLET, EXTENDED RELEASE ORAL at 17:25

## 2022-01-06 NOTE — RESPIRATORY THERAPY NOTE
RT Protocol Note  Sammy Job 63 y o  female MRN: [id number]  Unit/Bed#: QCD Encounter: [id number]    Assessment    Principal Problem:    SINGH (dyspnea on exertion)  Active Problems:    Hyperlipidemia    Pulmonary nodule    Elevated troponin    Smoker    Hypertension    HFrEF (heart failure with reduced ejection fraction) ([facility])    Acute on chronic combined systolic and diastolic congestive heart failure ([address] )    COVID-19 virus infection    Hypokalemia    Type 2 MI (myocardial infarction) ([facility] )    Nonischemic cardiomyopathy ([facility])      Home Pulmonary Medications:  Alb inhaler 2puff prn       Past Medical History:   Diagnosis Date    Hyperlipidemia     RSD (reflex sympathetic dystrophy)      Social History     Socioeconomic History    Marital status:      Spouse name: None    Number of children: None    Years of education: None    Highest education level: None   Occupational History    None   Tobacco Use    Smoking status: Current Every Day Smoker     Packs/day: 1 00     Types: Cigarettes    Smokeless tobacco: Never Used   Vaping Use    Vaping Use: Never used   Substance and Sexual Activity    Alcohol use: No    Drug use: No    Sexual activity: None   Other Topics Concern    None   Social History Narrative    None     Social Determinants of Health     Financial Resource Strain: Not on file   Food Insecurity: Not on file   Transportation Needs: Not on file   Physical Activity: Not on file   Stress: Not on file   Social Connections: Not on file   Intimate Partner Violence: Not on file   Housing Stability: Not on file       Subjective    Subjective Data: will order MDI due to Covid infection  99% on RA  Objective    Physical Exam:   Assessment Type: Assess only  Chest Assessment: Chest expansion symmetrical  Bilateral Breath Sounds: Diminished  O2 Device: RA    Vitals:  Blood pressure 142/82, pulse 75, temperature 98 6 °F (37 °C), temperature source Oral, resp   rate 20, height 5' 4" (1 626 m), weight 68 9 kg (152 lb), SpO2 97 %  Imaging and other studies: I have personally reviewed pertinent reports  O2 Device: RA     Plan to order MDI inhaler    Respiratory Plan: No distress/Pulmonary history        Resp Comments: pt seen prior for heart neb  pt in ED for sob  takes alb 2puff prn at home

## 2022-01-06 NOTE — ASSESSMENT & PLAN NOTE
Likely related to myocardial injury secondary to ongoing CHF exacerbation    - Patient currently denies any chest pain or tightness

## 2022-01-06 NOTE — ASSESSMENT & PLAN NOTE
Patient that heart failure with reduced EF, 32% with grade 2 diastolic dysfunction, severe diffuse hypokinesis, changes consistent with eccentric hypertrophy as of 05/2021      - patient is noncompliant with heart failure medications at baseline  - does not follow with a cardiologist in the outpatient setting  - HF consulted - input appreciated   - ECHO ( 1/6/22) shows EF - 25%, severely reduced systolic function, severe global hypokinesis and severely abnormal diastolic function     PLAN :   · Continue lasix 20 mg IV BID for now   · Follow recommendations per HF team

## 2022-01-06 NOTE — CASE MANAGEMENT
Case Management Assessment & Discharge Planning Note    Patient name La Kumar  Location ED 09/ED 09 MRN [id number]  : 1960 Date 2022       Current Admission Date: 2022  Current Admission Diagnosis:SINGH (dyspnea on exertion)   Patient Active Problem List    Diagnosis Date Noted    Arthralgia of multiple joints 2022    Polyarticular arthritis 2022    HFrEF (heart failure with reduced ejection fraction) ([facility] ) 2022    Acute on chronic combined systolic and diastolic congestive heart failure ([place] [address] ) 2022    COVID-19 virus infection 2022    Hypokalemia 2022    Type 2 MI (myocardial infarction) ([place] [address] ) 2022    Nonischemic cardiomyopathy ([place] [address] ) 2022    Fever 2021    New onset of congestive heart failure ([place] [address] ) 2021    Smoker 2021    Hypertension 2021    Shortness of breath 2021    Hypertensive urgency 2021    Positive D dimer 2021    Abnormal finding on imaging of liver 2021    Abdominal pain 2021    Elevated troponin 2021    SINGH (dyspnea on exertion) 2021    Hyperlipidemia     Pulmonary nodule       LOS (days): 1  Geometric Mean LOS (GMLOS) (days):   Days to GMLOS:     OBJECTIVE:              Current admission status: Observation       Preferred Pharmacy:   [facility]/pharmacy [address] [place] [address]  Phone: [phone] Fax: [phone]    Primary Care Provider: No primary care provider on file  Primary Insurance: [employer] VELIZ  Secondary Insurance:     ASSESSMENT:  Active Health Care Agents    There are no active Health Care Agents on file  DISCHARGE DETAILS:        Additional Comments: CM attempted to reach pt multiple times by phone with no answer, VM left  No hospital phone in ED room  CM unable to enter pt's room due to COVID status    Pt is reported be homeless and medication non compliant

## 2022-01-06 NOTE — UTILIZATION REVIEW
Initial Clinical Review    Admission: Date/Time/Statement:   Admission Orders (From admission, onward)     Ordered        01/05/22 2302  Place in Observation  Once                      Orders Placed This Encounter   Procedures    Place in Observation     Standing Status:   Standing     Number of Occurrences:   1     Order Specific Question:   Level of Care     Answer:   Med Surg [16]     ED Arrival Information     Expected Arrival Acuity    - 1/5/2022 16:47 Urgent         Means of arrival Escorted by Service Admission type    Walk-In Family Member SOD-B Medicine Urgent         Arrival complaint    shortness of breath, body aches        Chief Complaint   Patient presents with    Joint Swelling     Patient reports joint swelling and pain for the last few weeks; also complaining of SOB at times as well        Initial Presentation: 63year old female, presented to the ED @ [address] [facility] from home walk in with family member  Admitted as Observation due to Dyspnea on Exertion  H/O HFrEF with last known EF at 32% from 04/2021 and grade 2 diastolic dysfunction, severe diffuse hypokinesis, changes consistent with eccentric hypertrophy  Patient also had an [address] which revealed non-ischemic cardiomyopathy  One dose of IV lasix given in ED  Likely moved from NYHA class II to class III due to ongoing CHF exacerbation  CXR done, pending read but appears to show vascular markings with possible small pleural effusion in left lower lobe  + covid infection but appears to be asymptomatic at this time  Date:  01/05/2022  Continue IV lasix  Monitor strict I&O  Daily weights  Monitor & trend BMP/Cr - will correct any electrolyte derangements  Place on contact & Airborne Isolation  Monitor & trend respiratory status  Obtain ECHO  VTE Pharmacologic Prophylaxis: Enoxaparin (Lovenox)  VTE Mechanical Prophylaxis: sequential compression device     Day 2:   01/06/2022  ECHO shows new EF 25%  Continue IV diuresis    Monitor on telemetry  Monitor strict I&O  Daily standing weights  Trend BMP       2022  Heart Failure:  Continue IV Lasix 20 mg BID  Potassium 2 8 this AM (magnesium of 2 5)  Received 60 mEq IV potassium and 40 mEq PO potassium today  Repeat serum K level of 3 4; start PO potassium 20 mEq BID  Will hold off on restarting BB today  Check UDS  Not a candidate for ICD at this time due to poor adherence  At least 3 consecutive months of optimized medical therapy needed to meet indications for ICD  ED Triage Vitals [22 1720]   Temperature Pulse Respirations Blood Pressure SpO2   98 6 °F (37 °C) 93 18 (!) 183/91 96 % Room Air      Temp Source Heart Rate Source Patient Position - Orthostatic VS BP Location FiO2 (%)   Oral Monitor Sitting Left arm --      Pain Score       10 - Worst Possible Pain          Wt Readings from Last 1 Encounters:   22 68 9 kg (152 lb)     Additional Vital Signs:   Date/Time Temp Pulse Resp BP MAP (mmHg) SpO2 O2 Device Patient Position - Orthostatic VS   22 0900 -- 74 -- 155/103 Abnormal  -- -- -- --   22 0818 -- 74 20 155/103 Abnormal  -- 98 % None (Room air) Lying   22 0625 -- 70 20 149/95 -- 98 % None (Room air) Lying   22 0200 -- 78 21 146/95 116 95 % None (Room air) --   22 2313 -- 75 20 142/82 -- 97 % None (Room air) --   22 -- 76 32 Abnormal  165/111 Abnormal  134 99 % -- --   22 -- -- -- -- -- 99 % -- --     2022 @ 0851  Right Knee X:  No acute osseous abnormality  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  8 mm intra-articular loose body suggested  2021 @ 0902  Left ankle X:  No acute osseous abnormality  2021 @ 0816  Chest X:  Cardiomegaly   CHF       2021 @   EC, NSR, Left ventricular hypertrophy with repolarization abnormality    Pertinent Labs/Diagnostic Test Results:   Results from last 7 days   Lab Units 22   SARS-COV-2  Positive*     Results from last 7 days   Lab Units 01/06/22  0540 01/05/22 2001   WBC Thousand/uL 5 53 6 87   HEMOGLOBIN g/dL 12 2 13 5   HEMATOCRIT % 36 7 39 8   PLATELETS Thousands/uL 210 220   NEUTROS ABS Thousands/µL 3 50 4 42     Results from last 7 days   Lab Units 01/06/22  0540 01/05/22 2001   SODIUM mmol/L 141 140   POTASSIUM mmol/L 2 8* 3 2*   CHLORIDE mmol/L 106 107   CO2 mmol/L 28 24   ANION GAP mmol/L 7 9   BUN mg/dL 16 12   CREATININE mg/dL 1 00 0 93   EGFR ml/min/1 73sq m 61 67   CALCIUM mg/dL 8 8 9 8   MAGNESIUM mg/dL 2 5  --    PHOSPHORUS mg/dL 3 3  --      Results from last 7 days   Lab Units 01/05/22 2001   AST U/L 26   ALT U/L 31   ALK PHOS U/L 92   TOTAL PROTEIN g/dL 8 2   ALBUMIN g/dL 3 7   TOTAL BILIRUBIN mg/dL 1 35*     Results from last 7 days   Lab Units 01/06/22  0540 01/05/22 2001   GLUCOSE RANDOM mg/dL 123 104     Results from last 7 days   Lab Units 01/06/22  0027 01/05/22 2228 01/05/22 2001   HS TNI 0HR ng/L  --   --  128*   HS TNI 2HR ng/L  --  110*  --    HSTNI D2 ng/L  --  -18  --    HS TNI 4HR ng/L 106*  --   --    HSTNI D4 ng/L -22  --   --      Results from last 7 days   Lab Units 01/06/22 0540 01/06/22  0027   PROCALCITONIN ng/ml <0 05 <0 05     Results from last 7 days   Lab Units 01/05/22 2001   NT-PRO BNP pg/mL 6,804*     Results from last 7 days   Lab Units 01/06/22  0027   CRP mg/L 15 0*     Results from last 7 days   Lab Units 01/05/22 2001   INFLUENZA A PCR  Negative   INFLUENZA B PCR  Negative   RSV PCR  Negative     ED Treatment:   Medication Administration from 01/05/2022 1647 to 01/06/2022 0847       Date/Time Order Dose Route Action     01/05/2022 2110 albuterol inhalation solution 10 mg 10 mg Nebulization Given     01/05/2022 2109 ipratropium (ATROVENT) 0 02 % inhalation solution 1 mg 1 mg Nebulization Given     01/05/2022 2110 sodium chloride 0 9 % inhalation solution 3 mL 3 mL Nebulization Given     01/05/2022 2114 furosemide (LASIX) injection 20 mg 20 mg Intravenous Given 01/05/2022 2315 acetaminophen (TYLENOL) tablet 650 mg 650 mg Oral Given     01/05/2022 2314 ketorolac (TORADOL) injection 15 mg 15 mg Intravenous Given     01/06/2022 0807 losartan (COZAAR) tablet 50 mg 50 mg Oral Given     01/06/2022 0825 nicotine (NICODERM CQ) 7 mg/24hr TD 24 hr patch 1 patch 1 patch Transdermal Medication Applied     01/06/2022 0808 enoxaparin (LOVENOX) subcutaneous injection 40 mg 40 mg Subcutaneous Given     01/06/2022 0807 furosemide (LASIX) injection 20 mg 20 mg Intravenous Given     01/06/2022 0338 potassium chloride 20 mEq IVPB (premix) 20 mEq Intravenous New Bag     01/06/2022 0535 potassium chloride 20 mEq IVPB (premix) 20 mEq Intravenous New Bag     01/06/2022 0808 potassium chloride 20 mEq IVPB (premix) 20 mEq Intravenous New Bag     01/06/2022 0138 magnesium sulfate 2 g/50 mL IVPB (premix) 2 g 2 g Intravenous New Bag     01/06/2022 9523 Diclofenac Sodium (VOLTAREN) 1 % topical gel 2 g 2 g Topical Given     01/06/2022 0136 Diclofenac Sodium (VOLTAREN) 1 % topical gel 2 g 2 g Topical Given     01/06/2022 0134 lidocaine (LIDODERM) 5 % patch 1 patch 1 patch Topical Medication Applied     01/06/2022 0807 dicyclomine (BENTYL) tablet 20 mg 20 mg Oral Given        Past Medical History:   Diagnosis Date    Hyperlipidemia     RSD (reflex sympathetic dystrophy)      Present on Admission:   Hypertension   Hyperlipidemia   Pulmonary nodule   Elevated troponin   SINGH (dyspnea on exertion)   Smoker   HFrEF (heart failure with reduced ejection fraction) (HCC)   Acute on chronic combined systolic and diastolic congestive heart failure (HCC)   COVID-19 virus infection   Hypokalemia   Arthralgia of multiple joints   Abdominal pain      Admitting Diagnosis: Joint swelling [M25 40]  Age/Sex: 63 y o  female  Admission Orders:  CV diet with fld restriction 1800ml  Up with assistance, Up & OOB as tolerated, Ambulate q8h  IS q1h while awake  Aqua K - Abd  Daily weight  I&O  ECHO  Consult PT/OT  Theo SCDs  O2 @ 6L via NC    Scheduled Medications:  atorvastatin, 40 mg, Oral, After Dinner  Diclofenac Sodium, 2 g, Topical, 4x Daily  dicyclomine, 20 mg, Oral, 4x Daily (AC & HS)  enoxaparin, 40 mg, Subcutaneous, Daily  furosemide, 20 mg, Intravenous, BID (diuretic)  lidocaine, 1 patch, Topical, Daily  losartan, 50 mg, Oral, Daily  nicotine, 1 patch, Transdermal, Daily  potassium chloride, 20 mEq, Intravenous, Once      Continuous IV Infusions:     PRN Meds:  acetaminophen, 650 mg, Oral, Q6H PRN  albuterol, 2 puff, Inhalation, Q4H PRN  Labetalol HCl, 5 mg, Intravenous, Q6H PRN  ondansetron, 4 mg, Intravenous, Q6H PRN        IP CONSULT TO CASE MANAGEMENT    Network Utilization Review Department  ATTENTION: Please call with any questions or concerns to [phone] and carefully listen to the prompts so that you are directed to the right person  All voicemails are confidential   Daniel Foss all requests for admission clinical reviews, approved or denied determinations and any other requests to dedicated fax number below belonging to the campus where the patient is receiving treatment   List of dedicated fax numbers for the Facilities:  [address] DENIALS (Administrative/Medical Necessity) [phone]   [address] (Maternity/NICU/Pediatrics) [phone]   [address]  [phone]   Arabella Hoffman [id number] [place] [address] [address] [facility] [facility] [phone]

## 2022-01-06 NOTE — PROGRESS NOTES
[facility]  Progress Note - Darren Knight 1960, 63 y o  female MRN: [id number]  Unit/Bed#: ED 09 Encounter: [id number]  Primary Care Provider: No primary care provider on file  Date and time admitted to hospital: 1/5/2022  7:23 PM    Polyarticular arthritis  Assessment & Plan  · Patient presenting with 3 weeks history of asymmetric polyarticular joint pain associated with right knee, left ankle, right wrist, right 1st MCP, left 1st MCP  ·  Patient also reports of mild swelling in these areas along with joint pain  · Patient states that the joint pain tends to resolve at one site and then move to another joint  · Patient was noted to have a small effusion on the right knee  · Uric acid : normal, lyme panel : pending   · X ray of the right wrist : degenerative changes of the 1st MCP, X ray rt knee : 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  8 mm intra-articular loose body suggested  · S/p lidocaine patch for right knee   · Pt is Hepatitis C ab +ve     PLAN :   · Will order work up for rheumatoid arthritis, follow pending labs  · Ortho consulted : will do aspiration of knee joint, possibly steroid injection in AM due to 1o osteoarthritis     Hypokalemia  Assessment & Plan  Presenting to ED with hypokalemia, 3 2   - was given 3x 20 mEq IVPB KCl  - K on am labs : 2 8   - repleted with 40 Meq PO x 1, 20 Meq IV x 1  - will continue to monitor with BMP      COVID-19 virus infection  Assessment & Plan  Patient with no history of COVID vaccination, testing positive for COVID on 01/06/2022    - patient currently stable on room air, asymptomatic  - patient denies any fevers or chills  - placed on contact and airborne isolation    Acute on chronic combined systolic and diastolic congestive heart failure [facility])  Assessment & Plan  Wt Readings from Last 3 Encounters:   01/05/22 68 9 kg (152 lb)   05/07/21 69 kg (152 lb 3 2 oz)   05/06/21 68 8 kg (151 lb 12 oz) Patient with HFrEF 32% presented with 3 week history of shortness of breath, dyspnea on exertion, orthopnea and long-term medication noncompliance at home  Likely concerning for acute on chronic CHF  Patient also had a prior admission in 2021 with similar symptoms  ProBNP at 6804 at this admission     - Plan as above  - On furosemide 20 mg IV bid  - monitor strict I's & O's  - daily standing weights  - trend BMP and sCr  - will correct any electrolyte derangements  - HF team on board   - placed on cardiac diet, low-sodium  - patient may benefit from outpatient cardiology follow-up after discharge      HFrEF (heart failure with reduced ejection fraction) [facility])  Assessment & Plan  Patient that heart failure with reduced EF, 32% with grade 2 diastolic dysfunction, severe diffuse hypokinesis, changes consistent with eccentric hypertrophy as of 05/2021      - patient is noncompliant with heart failure medications at baseline  - does not follow with a cardiologist in the outpatient setting  - HF consulted - input appreciated   - ECHO ( 1/6/22) shows EF - 25%, severely reduced systolic function, severe global hypokinesis and severely abnormal diastolic function     PLAN :   · Continue lasix 20 mg IV BID for now   · Follow recommendations per HF team       Hypertension  Assessment & Plan  History of hypertension   - was prescribed losartan 50 mg daily, furosemide 20 mg daily, metoprolol succinate 25 mg daily  Patient is not compliant with medications at home  - will start patient on IV furosemide 20 mg b i d , losartan 50 mg daily  - will hold metoprolol in context of ongoing CHF exacerbation    Smoker  Assessment & Plan  H/o smoking  Currently smokes 4 cigarettes per day    - Patient provided with nicotine patch 7 mg while inpatient    Elevated troponin  Assessment & Plan  Likely related to myocardial injury secondary to ongoing CHF exacerbation    - Patient currently denies any chest pain or tightness          Pulmonary nodule  Assessment & Plan  · H/o 5 mm right middle lobe nodule  · Based on current Fleischner Society 2017 Guidelines on incidental pulmonary nodule, no routine follow-up is needed if the patient is considered low risk for lung cancer  ·  If the patient is considered high risk for lung cancer, 12 month follow-up non-contrast chest CT is recommended  Hyperlipidemia  Assessment & Plan  On atorvastatin 40 mg qd    Abdominal pain  Assessment & Plan  Patient presented with intermittent generalized abdominal pain along with mild abdominal swelling  Patient was also noted to have similar abdominal symptoms at last admission in 04/2021  At that time, patient was noted to have some atherosclerosis of abdominal vessels (CT abdomen pelvis from that admission)  Her abdominal pain may be correlated to this  She may also benefit from additional diuresis to decrease possible third spacing of fluids in abdomen as a result of CHF   - Patient had mesenteric duplex and RUQ  U/S at previous admission which did not reveal any significant findings  - Was noted to be responsive to bentyl at last admission, ordered bentyl  - Diuresis- 20 mg IV furosemide bid  - Will continue to monitor    * SINGH (dyspnea on exertion)  Assessment & Plan  Patient with h/o HFrEF with last known EF at 32% from 04/2021 and grade 2 diastolic dysfunction, severe diffuse hypokinesis, changes consistent with eccentric hypertrophy  Patient also had an [address] which revealed non-ischemic cardiomyopathy  In context of patients recent h/o SOB, orthopnea, abdominal distension, this may be related to CHF exacerbation  Patient endorses that she is non-compliant with her medications  Patients Singh maybe related to pulmonary vascular congestion and non-compliance with medications       - Patient was given one time dose of IV lasix 20 mg in ED  - Echo shows new EF of 25%   - HF on board   - Likely moved from NYHA class II to class III due to ongoing CHF exacerbation  -  Currently stable on room air  - Patient has covid infection but appears to be asymptomatic at this time                INTERNAL MEDICINE RESIDENCY PROGRESS NOTE     Name: Rock Magana   Age & Sex: 63 y o  female   MRN: [id number]  Unit/Bed#: ED 09   Encounter: [id number]  Team: SOD Team B     PATIENT INFORMATION     Name: Rock Magana   Age & Sex: 63 y o  female   MRN: [id number]  Hospital Stay Days: 1    ASSESSMENT/PLAN     Principal Problem:    SINGH (dyspnea on exertion)  Active Problems:    Abdominal pain    Hyperlipidemia    Pulmonary nodule    Elevated troponin    Smoker    Hypertension    HFrEF (heart failure with reduced ejection fraction) ([facility] [address] )    Acute on chronic combined systolic and diastolic congestive heart failure ([facility] [address] )    COVID-19 virus infection    Hypokalemia    Arthralgia of multiple joints    Polyarticular arthritis      Polyarticular arthritis  Assessment & Plan  · Patient presenting with 3 weeks history of asymmetric polyarticular joint pain associated with right knee, left ankle, right wrist, right 1st MCP, left 1st MCP  ·  Patient also reports of mild swelling in these areas along with joint pain  · Patient states that the joint pain tends to resolve at one site and then move to another joint  · Patient was noted to have a small effusion on the right knee  · Uric acid : normal, lyme panel : pending   · X ray of the right wrist : degenerative changes of the 1st MCP, X ray rt knee : 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   8 mm intra-articular loose body suggested  · S/p lidocaine patch for right knee   · Pt is Hepatitis C ab +ve     PLAN :   · Will order work up for rheumatoid arthritis, follow pending labs  · Ortho consulted : will do aspiration of knee joint, possibly steroid injection in AM due to 1o osteoarthritis     Hypokalemia  Assessment & Plan  Presenting to ED with hypokalemia, 3 2   - was given 3x 20 mEq IVPB KCl  - K on am labs : 2 8   - repleted with 40 Meq PO x 1, 20 Meq IV x 1  - will continue to monitor with BMP      COVID-19 virus infection  Assessment & Plan  Patient with no history of COVID vaccination, testing positive for COVID on 01/06/2022  - patient currently stable on room air, asymptomatic  - patient denies any fevers or chills  - placed on contact and airborne isolation    Acute on chronic combined systolic and diastolic congestive heart failure ([facility])  Assessment & Plan  Wt Readings from Last 3 Encounters:   01/05/22 68 9 kg (152 lb)   05/07/21 69 kg (152 lb 3 2 oz)   05/06/21 68 8 kg (151 lb 12 oz)     Patient with HFrEF 32% presented with 3 week history of shortness of breath, dyspnea on exertion, orthopnea and long-term medication noncompliance at home  Likely concerning for acute on chronic CHF  Patient also had a prior admission in 2021 with similar symptoms   ProBNP at 6804 at this admission     - Plan as above  - On furosemide 20 mg IV bid  - monitor strict I's & O's  - daily standing weights  - trend BMP and sCr  - will correct any electrolyte derangements  - HF team on board   - placed on cardiac diet, low-sodium  - patient may benefit from outpatient cardiology follow-up after discharge      HFrEF (heart failure with reduced ejection fraction) ([facility] [address] )  Assessment & Plan  Patient that heart failure with reduced EF, 32% with grade 2 diastolic dysfunction, severe diffuse hypokinesis, changes consistent with eccentric hypertrophy as of 05/2021      - patient is noncompliant with heart failure medications at baseline  - does not follow with a cardiologist in the outpatient setting  - HF consulted - input appreciated   - ECHO ( 1/6/22) shows EF - 25%, severely reduced systolic function, severe global hypokinesis and severely abnormal diastolic function     PLAN :   · Continue lasix 20 mg IV BID for now   · Follow recommendations per HF team       Hypertension  Assessment & Plan  History of hypertension   - was prescribed losartan 50 mg daily, furosemide 20 mg daily, metoprolol succinate 25 mg daily  Patient is not compliant with medications at home  - will start patient on IV furosemide 20 mg b i d , losartan 50 mg daily  - will hold metoprolol in context of ongoing CHF exacerbation    Smoker  Assessment & Plan  H/o smoking  Currently smokes 4 cigarettes per day  - Patient provided with nicotine patch 7 mg while inpatient    Elevated troponin  Assessment & Plan  Likely related to myocardial injury secondary to ongoing CHF exacerbation    - Patient currently denies any chest pain or tightness          Pulmonary nodule  Assessment & Plan  · H/o 5 mm right middle lobe nodule  · Based on current Fleischner Society 2017 Guidelines on incidental pulmonary nodule, no routine follow-up is needed if the patient is considered low risk for lung cancer  ·  If the patient is considered high risk for lung cancer, 12 month follow-up non-contrast chest CT is recommended  Hyperlipidemia  Assessment & Plan  On atorvastatin 40 mg qd    Abdominal pain  Assessment & Plan  Patient presented with intermittent generalized abdominal pain along with mild abdominal swelling  Patient was also noted to have similar abdominal symptoms at last admission in 04/2021  At that time, patient was noted to have some atherosclerosis of abdominal vessels (CT abdomen pelvis from that admission)  Her abdominal pain may be correlated to this   She may also benefit from additional diuresis to decrease possible third spacing of fluids in abdomen as a result of CHF   - Patient had mesenteric duplex and RUQ  U/S at previous admission which did not reveal any significant findings  - Was noted to be responsive to bentyl at last admission, ordered bentyl  - Diuresis- 20 mg IV furosemide bid  - Will continue to monitor    * SINGH (dyspnea on exertion)  Assessment & Plan  Patient with h/o HFrEF with last known EF at 32% from 04/2021 and grade 2 diastolic dysfunction, severe diffuse hypokinesis, changes consistent with eccentric hypertrophy  Patient also had an [address] which revealed non-ischemic cardiomyopathy  In context of patients recent h/o SOB, orthopnea, abdominal distension, this may be related to CHF exacerbation  Patient endorses that she is non-compliant with her medications  Patients Andrews maybe related to pulmonary vascular congestion and non-compliance with medications  - Patient was given one time dose of IV lasix 20 mg in ED  - Echo shows new EF of 25%   - HF on board   - Likely moved from NYHA class II to class III due to ongoing CHF exacerbation  -  Currently stable on room air  - Patient has covid infection but appears to be asymptomatic at this time              Disposition: Pending clinical improvement     SUBJECTIVE     Patient seen and examined  No acute events overnight  Patient denies any new complaints  She is answering in one word answers and not providing much history  She denies any worsening of SOB  She I currently on room air  OBJECTIVE     Vitals:    22 0625 22 0818 22 0900 22 1130   BP: 149/95 (!) 155/103 (!) 155/103 151/88   BP Location: Right arm Right arm  Right arm   Pulse: 70 74 74 61   Resp: 20 20  17   Temp:       TempSrc:       SpO2: 98% 98%  97%   Weight:   68 9 kg (152 lb)    Height:   5' 4" (1 626 m)       Temperature:   Temp (24hrs), Av 6 °F (37 °C), Min:98 6 °F (37 °C), Max:98 6 °F (37 °C)    Temperature: 98 6 °F (37 °C)  Intake & Output:  I/O        07 07 07 07 07    IV Piggyback  150 200    Total Intake(mL/kg)  150 (2 2) 200 (2 9)    Net  +150 +200           Unmeasured Urine Occurrence  4 x         Weights:        Body mass index is 26 09 kg/m²  Weight (last 2 days)     Date/Time Weight    22 0900 68 9 (152)    22 1720 68 9 (152)        Physical Exam  Vitals reviewed  Constitutional:       General: She is not in acute distress  Appearance: She is not ill-appearing  HENT:      Head: Normocephalic and atraumatic  Mouth/Throat:      Mouth: Mucous membranes are moist    Eyes:      General: No scleral icterus  Right eye: No discharge  Left eye: No discharge  Cardiovascular:      Rate and Rhythm: Normal rate and regular rhythm  Pulses: Normal pulses  Heart sounds: Normal heart sounds  Pulmonary:      Effort: Pulmonary effort is normal       Comments: Diminished breath sounds b/l   Abdominal:      General: Bowel sounds are normal       Palpations: Abdomen is soft  Tenderness: There is no abdominal tenderness  Musculoskeletal:      Right wrist: Swelling and tenderness present  Right knee: Effusion present  Decreased range of motion  Tenderness present  Right lower leg: No edema  Left lower leg: No edema  Skin:     General: Skin is warm and dry  Capillary Refill: Capillary refill takes less than 2 seconds  Neurological:      Mental Status: She is alert and oriented to person, place, and time  LABORATORY DATA     Labs: I have personally reviewed pertinent reports  Results from last 7 days   Lab Units 01/06/22  0540 01/05/22 2001   WBC Thousand/uL 5 53 6 87   HEMOGLOBIN g/dL 12 2 13 5   HEMATOCRIT % 36 7 39 8   PLATELETS Thousands/uL 210 220   NEUTROS PCT % 63 64   MONOS PCT % 10 10      Results from last 7 days   Lab Units 01/06/22  0540 01/05/22 2001   POTASSIUM mmol/L 2 8* 3 2*   CHLORIDE mmol/L 106 107   CO2 mmol/L 28 24   BUN mg/dL 16 12   CREATININE mg/dL 1 00 0 93   CALCIUM mg/dL 8 8 9 8   ALK PHOS U/L  --  92   ALT U/L  --  31   AST U/L  --  26     Results from last 7 days   Lab Units 01/06/22  0540 01/06/22  0027   MAGNESIUM mg/dL 2 5 2 1     Results from last 7 days   Lab Units 01/06/22  0540   PHOSPHORUS mg/dL 3 3                    IMAGING & DIAGNOSTIC TESTING     Radiology Results: I have personally reviewed pertinent reports    XR chest 1 view portable    Result Date: 1/6/2022  Impression: Cardiomegaly  CHF  Workstation performed: [id number]     XR wrist 3+ vw right    Result Date: 1/6/2022  Impression: No acute osseous abnormality  Workstation performed: [id number]     XR knee 3 vw right non injury    Result Date: 1/6/2022  Impression: No acute osseous abnormality  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  8 mm intra-articular loose body suggested  Workstation performed: [id number]     XR ankle 3+ vw left    Result Date: 1/6/2022  Impression: No acute osseous abnormality  Degenerative changes as described  Workstation performed: [id number]     Other Diagnostic Testing: I have personally reviewed pertinent reports  ACTIVE MEDICATIONS     Current Facility-Administered Medications   Medication Dose Route Frequency    acetaminophen (TYLENOL) tablet 650 mg  650 mg Oral Q6H PRN    albuterol (PROVENTIL HFA,VENTOLIN HFA) inhaler 2 puff  2 puff Inhalation Q4H PRN    atorvastatin (LIPITOR) tablet 40 mg  40 mg Oral After Dinner    Diclofenac Sodium (VOLTAREN) 1 % topical gel 2 g  2 g Topical 4x Daily    dicyclomine (BENTYL) tablet 20 mg  20 mg Oral 4x Daily (AC & HS)    enoxaparin (LOVENOX) subcutaneous injection 40 mg  40 mg Subcutaneous Daily    furosemide (LASIX) injection 20 mg  20 mg Intravenous BID (diuretic)    Labetalol HCl (NORMODYNE) injection 5 mg  5 mg Intravenous Q6H PRN    lidocaine (LIDODERM) 5 % patch 1 patch  1 patch Topical Daily    losartan (COZAAR) tablet 50 mg  50 mg Oral Daily    nicotine (NICODERM CQ) 7 mg/24hr TD 24 hr patch 1 patch  1 patch Transdermal Daily    ondansetron (ZOFRAN) injection 4 mg  4 mg Intravenous Q6H PRN       VTE Pharmacologic Prophylaxis: Enoxaparin (Lovenox)  VTE Mechanical Prophylaxis: sequential compression device    Portions of the record may have been created with voice recognition software    Occasional wrong word or "sound a like" substitutions may have occurred due to the inherent limitations of voice recognition software    Read the chart carefully and recognize, using context, where substitutions have occurred   ==  Preston Paredes MD  [address]  Internal Medicine Residency PGY-3

## 2022-01-06 NOTE — ED NOTES
Son Claudia Stable updated; phone number [phone]; pt stated OK to provide update and OK for her to speak with him once we have the portable phone     Kaykay Kamara RN  01/06/22 6603

## 2022-01-06 NOTE — ASSESSMENT & PLAN NOTE
Patient with h/o HFrEF with last known EF at 32% from 04/2021 and grade 2 diastolic dysfunction, severe diffuse hypokinesis, changes consistent with eccentric hypertrophy  Patient also had an [address] which revealed non-ischemic cardiomyopathy  In context of patients recent h/o SOB, orthopnea, abdominal distension, this may be related to CHF exacerbation  Patient endorses that she is non-compliant with her medications  Patients Andrews maybe related to pulmonary vascular congestion and non-compliance with medications       - Patient was given one time dose of IV lasix 20 mg in ED  - Echo shows new EF of 25%   - HF on board   - Likely moved from NYHA class II to class III due to ongoing CHF exacerbation  -  Currently stable on room air  - Patient has covid infection but appears to be asymptomatic at this time

## 2022-01-06 NOTE — H&P
INTERNAL MEDICINE RESIDENCY ADMISSION H&P     Name: Lamberto Gross   Age & Sex: 63 y o  female   MRN: [id number]  Unit/Bed#: ED 09   Encounter: [id number]  Primary Care Provider: No primary care provider on file  Code Status: Level 1 - Full Code  Admission Status: INPATIENT   Disposition: Patient requires Med/Surg    Admit to team: SOD Team B     ASSESSMENT/PLAN     Principal Problem:    SINGH (dyspnea on exertion)  Active Problems:    Acute on chronic combined systolic and diastolic congestive heart failure (HCC)    Abdominal pain    Polyarticular arthritis    HFrEF (heart failure with reduced ejection fraction) ([facility] [address] )    COVID-19 virus infection    Hypertension    Hypokalemia    Pulmonary nodule    Elevated troponin    Smoker    Hyperlipidemia    Arthralgia of multiple joints      * SINGH (dyspnea on exertion)  Assessment & Plan  Patient with h/o HFrEF with last known EF at 32% from 04/2021 and grade 2 diastolic dysfunction, severe diffuse hypokinesis, changes consistent with eccentric hypertrophy  Patient also had an [facility] which revealed non-ischemic cardiomyopathy  In context of patients recent h/o SOB, orthopnea, abdominal distension, this may be related to CHF exacerbation  Patient endorses that she is non-compliant with her medications  Patients Singh maybe related to pulmonary vascular congestion and non-compliance with medications       - Patient was given one time dose of IV lasix 20 mg in ED  - Echo ordered  - Patient may benefit from a heart failure consult due to recent h/o another admission in 04/2021 for CHF exacerbation as well  - Likely moved from NYHA class II to class III due to ongoing CHF exacerbation  -  Currently stable on room air  - CXR done, pending read but appears to show vascular markings with possible small pleural effusion in left lower lobe  - Patient has covid infection but appears to be asymptomatic at this time          Acute on chronic combined systolic and diastolic congestive heart failure (HCC)  Assessment & Plan  Wt Readings from Last 3 Encounters:   01/05/22 68 9 kg (152 lb)   05/07/21 69 kg (152 lb 3 2 oz)   05/06/21 68 8 kg (151 lb 12 oz)     Patient with HFrEF 32% presenting with 3 week history of shortness of breath, dyspnea on exertion, orthopnea and long-term medication noncompliance at home  Likely concerning for acute on chronic CHF  Patient also had a prior admission in 2021 with similar symptoms  ProBNP at 6804 at this admission     - Plan as above  - On furosemide 20 mg IV bid  - monitor strict I's & O's  - daily standing weights  - trend BMP and sCr  - will correct any electrolyte derangements  - consider heart failure consult due to reduced EF of 32% and for possible evaluation of life vest  - placed on cardiac diet, low-sodium  - patient may benefit from outpatient cardiology follow-up after discharge      Polyarticular arthritis  Assessment & Plan  Patient presenting with 3 weeks history of asymmetric polyarticular joint pain associated with right knee, left ankle, right wrist, right 1st MCP, left 1st MCP  Patient also reports of mild swelling in these areas along with joint pain  Patient states that the joint pain tends to resolve at one site and then move to another joint  Patient was noted to have a small effusion on the right knee  - ordered uric acid  - ordered lyme panel  - ordered XRs of specific joint sites  - On Voltaren gel for pain relief  - On lidocaine patch for right knee  - warm compresses as tolerated  - consider orthopedic evaluation for joint arthrocentesis      Abdominal pain  Assessment & Plan  Patient presenting with intermittent generalized abdominal pain along with mild abdominal swelling  Patient was also noted to have similar abdominal symptoms at last admission in 04/2021  At that time, patient was noted to have some atherosclerosis of abdominal vessels (CT abdomen pelvis from that admission)   Her abdominal pain may be correlated to this  She may also benefit from additional diuresis to decrease possible third spacing of fluids in abdomen as a result of CHF   - Patient had mesenteric duplex and RUQ  U/S at previous admission which did not reveal any significant findings  - Was noted to be responsive to bentyl at last admission, ordered bentyl  - Diuresis- 20 mg IV furosemide bid  - Will continue to monitor    COVID-19 virus infection  Assessment & Plan  Patient with no history of COVID vaccination, testing positive for COVID on 01/06/2022  - patient currently stable on room air, asymptomatic  - patient denies any fevers or chills  - placed on contact and airborne isolation    HFrEF (heart failure with reduced ejection fraction) ([facility] [address] )  Assessment & Plan  Patient that heart failure with reduced EF, 32% with grade 2 diastolic dysfunction, severe diffuse hypokinesis, changes consistent with eccentric hypertrophy as of 05/2021      - patient is noncompliant with heart failure medications at baseline  - does not follow with a cardiologist in the outpatient setting  - may benefit from heart failure evaluation at this in-patient visit  - echo ordered  - in context of reduced EF of 32%, patient may also benefit from evaluation for a LifeVest/ICD      Hypokalemia  Assessment & Plan  Presenting to ED with hypokalemia, 3 2   - was given 3x 20 mEq IVPB KCl  - will continue to monitor with BMP  - involved replete as needed    Hypertension  Assessment & Plan  History of hypertension   - was prescribed losartan 50 mg daily, furosemide 20 mg daily, metoprolol succinate 25 mg daily    Patient is not compliant with medications at home  - will start patient on IV furosemide 20 mg b i d , losartan 50 mg daily  - will hold metoprolol in context of ongoing CHF exacerbation    Elevated troponin  Assessment & Plan  Likely related to myocardial injury secondary to ongoing CHF exacerbation    - Patient currently denies any chest pain or tightness  - Echo ordered        Pulmonary nodule  Assessment & Plan  H/o 5 mm right middle lobe nodule  Based on current Fleischner Society 2017 Guidelines on incidental pulmonary nodule, no routine follow-up is needed if the patient is considered low risk for lung cancer  If the patient is considered high risk for lung cancer, 12 month follow-up non-contrast chest CT is recommended  Smoker  Assessment & Plan  H/o smoking  Currently smokes 4 cigarettes per day  - Patient provided with nicotine patch 7 mg while inpatient    Hyperlipidemia  Assessment & Plan  On atorvastatin 40 mg qd      VTE Pharmacologic Prophylaxis: Enoxaparin (Lovenox)  VTE Mechanical Prophylaxis: sequential compression device    CHIEF COMPLAINT     Chief Complaint   Patient presents with    Joint Swelling     Patient reports joint swelling and pain for the last few weeks; also complaining of SOB at times as well       HISTORY OF PRESENT ILLNESS     Patient is a 63year old female with history of CHF (last EF 32% from 05/2021, G2DD), hypertension, hyperlipidemia presenting to the ED for shortness of breath and arthralgias for past 3 weeks  She also describes of generalized abdominal pain during this time frame  She states that she used to be able to walk a block comfortably prior to this but now experiences dyspnea on exertion with getting out of bed and walking a few feet  She also has had a whitish colored productive cough  She is unaware of any weight gain but does endorse having some orthopnea  She denies any chest pain at rest or with exertion  Has some generalized abdominal pain and reports of mild abdominal swelling  Denies any fevers, chills  She is not on any oxygen at baseline at home  She attempts to make her own meals on a daily basis but is not on a strict low salt diet  Does not follow with a cardiologist and is non-compliant with all of her medications  She states she is currently homeless and lives out of a car   Patient reports that she was admitted to the ED for similar symptoms in 2021  She is currently unvaccinated for covid  In the ED, patients BP was 183/91 with HR 93, T: 98 6F, saturating 96% on room air  BMP revealed potassium at 3 2, ProBNP of 6804, hs-troponin of 128, WBC ct at 6 87, patient tested positive for covid infection, CXR was ordered  Admitted to [facility] for acute on chronic CHF  REVIEW OF SYSTEMS     Review of Systems   Constitutional: Positive for activity change  Negative for chills and fever  HENT: Negative for congestion, rhinorrhea and sore throat  Respiratory: Positive for cough and shortness of breath  Negative for chest tightness  Cardiovascular: Negative for chest pain  Gastrointestinal: Positive for abdominal pain  Negative for constipation  Musculoskeletal: Positive for arthralgias and joint swelling  OBJECTIVE     Vitals:    22 1720 22 2110 22 2115 22 2313   BP: (!) 183/91  (!) 165/111 142/82   BP Location: Left arm      Pulse: 93  76 75   Resp: 18  (!) 32 20   Temp: 98 6 °F (37 °C)      TempSrc: Oral      SpO2: 96% 99% 99% 97%   Weight: 68 9 kg (152 lb)      Height: 5' 4" (1 626 m)         Temperature:   Temp (24hrs), Av 6 °F (37 °C), Min:98 6 °F (37 °C), Max:98 6 °F (37 °C)    Temperature: 98 6 °F (37 °C)  Intake & Output:  I/O        0701   0700  0701   0700          Unmeasured Urine Occurrence  4 x        Weights:        Body mass index is 26 09 kg/m²  Weight (last 2 days)     Date/Time Weight    22 1720 68 9 (152)        Physical Exam  Vitals reviewed  HENT:      Head: Normocephalic and atraumatic  Cardiovascular:      Rate and Rhythm: Normal rate and regular rhythm  Pulses: Normal pulses  Heart sounds: Normal heart sounds  Pulmonary:      Effort: Pulmonary effort is normal       Comments: Mildly diminished breath sounds in left lower lobe  Abdominal:      General: Bowel sounds are normal       Palpations: Abdomen is soft  Tenderness: There is generalized abdominal tenderness  Musculoskeletal:      Right wrist: Swelling and tenderness present  Right knee: Effusion present  Decreased range of motion  Tenderness present  Right lower leg: No edema  Left lower leg: No edema  Skin:     General: Skin is warm and dry  Capillary Refill: Capillary refill takes less than 2 seconds  Neurological:      Mental Status: She is alert and oriented to person, place, and time  PAST MEDICAL HISTORY     Past Medical History:   Diagnosis Date    Hyperlipidemia     RSD (reflex sympathetic dystrophy)      PAST SURGICAL HISTORY     Past Surgical History:   Procedure Laterality Date    BREAST SURGERY       SOCIAL & FAMILY HISTORY     Social History     Substance and Sexual Activity   Alcohol Use No     Social History     Substance and Sexual Activity   Drug Use No     Social History     Tobacco Use   Smoking Status Current Every Day Smoker    Packs/day: 1 00    Types: Cigarettes   Smokeless Tobacco Never Used     History reviewed  No pertinent family history  LABORATORY DATA     Labs: I have personally reviewed pertinent reports  Results from last 7 days   Lab Units 01/05/22 2001   WBC Thousand/uL 6 87   HEMOGLOBIN g/dL 13 5   HEMATOCRIT % 39 8   PLATELETS Thousands/uL 220   NEUTROS PCT % 64   MONOS PCT % 10      Results from last 7 days   Lab Units 01/05/22 2001   POTASSIUM mmol/L 3 2*   CHLORIDE mmol/L 107   CO2 mmol/L 24   BUN mg/dL 12   CREATININE mg/dL 0 93   CALCIUM mg/dL 9 8   ALK PHOS U/L 92   ALT U/L 31   AST U/L 26                          Micro:  Lab Results   Component Value Date    BLOODCX No Growth After 5 Days  05/05/2021    BLOODCX No Growth After 5 Days  05/05/2021     IMAGING & DIAGNOSTIC TESTS     Imaging: I have personally reviewed pertinent reports  No results found  EKG, Pathology, and Other Studies: I have personally reviewed pertinent reports       ALLERGIES   No Known Allergies  MEDICATIONS PRIOR TO ARRIVAL     Prior to Admission medications    Medication Sig Start Date End Date Taking?  Authorizing Provider   albuterol (PROVENTIL HFA,VENTOLIN HFA) 90 mcg/act inhaler Inhale 2 puffs every 4 (four) hours as needed for wheezing 5/7/21   Dakotah Diallo MD   atorvastatin (LIPITOR) 40 mg tablet TAKE 1 TABLET (40 MG TOTAL) BY MOUTH DAILY AFTER DINNER 5/29/21   Dakotah Diallo MD   calcium carbonate (TUMS) 500 mg chewable tablet Chew 2 tablets (1,000 mg total) daily as needed for indigestion or heartburn 5/7/21   Dakotah Diallo MD   dicyclomine (BENTYL) 20 mg tablet TAKE 1 TABLET (20 MG TOTAL) BY MOUTH 3 (THREE) TIMES A DAY BEFORE MEALS 5/31/21   Wilmar Diallo MD   furosemide (LASIX) 20 mg tablet TAKE 1 TABLET AS NEEDED PLEASE TAKE 1 TABLET BY MOUTH AS NEEDED FOR LOWER EXTREMITY SWELLING  5/31/21   Dakotah Diallo MD   losartan (COZAAR) 50 mg tablet TAKE 1 TABLET BY MOUTH EVERY DAY 5/29/21   Dakotah Diallo MD   melatonin 3 mg Take 1 tablet (3 mg total) by mouth daily at bedtime 5/7/21   Dakotah Diallo MD   metoprolol succinate (TOPROL-XL) 25 mg 24 hr tablet TAKE 1 TABLET BY MOUTH TWICE A DAY 5/29/21   Wilmar Diallo MD   nicotine (NICODERM CQ) 7 mg/24hr TD 24 hr patch Place 1 patch on the skin daily 5/8/21   Dakotah Diallo MD   triamcinolone (KENALOG) 0 1 % cream Apply topically 2 (two) times a day 5/7/21   Tila Post MD     MEDICATIONS ADMINISTERED IN LAST 24 HOURS     Medication Administration - last 24 hours from 01/05/2022 0153 to 01/06/2022 0153       Date/Time Order Dose Route Action Action by     01/05/2022 2110 albuterol inhalation solution 10 mg 10 mg Nebulization Given Debbie Lali, RT     01/05/2022 2109 ipratropium (ATROVENT) 0 02 % inhalation solution 1 mg 1 mg Nebulization Given Debbie Lali, RT     01/05/2022 2110 sodium chloride 0 9 % inhalation solution 3 mL 3 mL Nebulization Given Debbie Ofili, RT     01/05/2022 2114 furosemide (LASIX) injection 20 mg 20 mg Intravenous Given Ashtyn Gaffney RN     01/05/2022 2315 acetaminophen (TYLENOL) tablet 650 mg 650 mg Oral Given Ashtyn Gaffney RN     01/05/2022 2314 ketorolac (TORADOL) injection 15 mg 15 mg Intravenous Given Ashtyn Gaffney RN     01/06/2022 0138 magnesium sulfate 2 g/50 mL IVPB (premix) 2 g 2 g Intravenous [address] Sesarcecilia Dupontpankaj henao RN     01/06/2022 0136 Diclofenac Sodium (VOLTAREN) 1 % topical gel 2 g 2 g Topical Given Ashtyn Gaffney RN     01/06/2022 0134 lidocaine (LIDODERM) 5 % patch 1 patch 1 patch Topical Medication Applied Ashtyn Gaffney RN        CURRENT MEDICATIONS     Current Facility-Administered Medications   Medication Dose Route Frequency Provider Last Rate    acetaminophen  650 mg Oral Q6H PRN Galen Echavarria MD      albuterol  2 puff Inhalation Q4H PRN Galen Echavarria MD      atorvastatin  40 mg Oral After Shanda Hopkins MD      Diclofenac Sodium  2 g Topical 4x Daily Cee Melendez MD      enoxaparin  40 mg Subcutaneous Daily Cee Melendez MD      furosemide  20 mg Intravenous BID (diuretic) Galen Echavarria MD      Labetalol HCl  5 mg Intravenous Q6H PRN Galen Echavarria MD      lidocaine  1 patch Topical Daily Cee Melendez MD      losartan  50 mg Oral Daily Galen Echavarria MD      magnesium sulfate  2 g Intravenous Once Galen Echavarria MD      nicotine  1 patch Transdermal Daily Cee Melendez MD      ondansetron  4 mg Intravenous Q6H PRN Galen Echavarria MD      potassium chloride  20 mEq Intravenous Once Galen Echavarria MD      potassium chloride  20 mEq Intravenous Once Galen Echavarria MD      potassium chloride  20 mEq Intravenous Once Galen Echavarria MD          acetaminophen, 650 mg, Q6H PRN  albuterol, 2 puff, Q4H PRN  Labetalol HCl, 5 mg, Q6H PRN  ondansetron, 4 mg, Q6H PRN        Admission Time  I spent 30 minutes admitting the patient    This involved direct patient contact where I performed a full history and physical, reviewing previous records, and reviewing laboratory and other diagnostic studies  Portions of the record may have been created with voice recognition software  Occasional wrong word or "sound a like" substitutions may have occurred due to the inherent limitations of voice recognition software    Read the chart carefully and recognize, using context, where substitutions have occurred     ==  Flo Aguirre MD  [address]  Internal Medicine Residency PGY-1

## 2022-01-06 NOTE — ASSESSMENT & PLAN NOTE
History of hypertension   - was prescribed losartan 50 mg daily, furosemide 20 mg daily, metoprolol succinate 25 mg daily    Patient is not compliant with medications at home  - will start patient on IV furosemide 20 mg b i d , losartan 50 mg daily  - will hold metoprolol in context of ongoing CHF exacerbation

## 2022-01-06 NOTE — ED NOTES
Resp called on [id number] for MASSEY neb   Therapist reports she will be down shortly     Jane Graff, RN  01/05/22 2051

## 2022-01-06 NOTE — CONSULTS
Advanced Heart Failure / Pulmonary Hypertension Service Consultation    Helen Brown 63 y o  female  MRN: [id number]  Unit/Bed#: ED 09; Encounter: [id number]    Assessment:  Principal Problem:    SINGH (dyspnea on exertion)  Active Problems:    Abdominal pain    Hyperlipidemia    Pulmonary nodule    Elevated troponin    Smoker    Hypertension    HFrEF (heart failure with reduced ejection fraction) ([facility])    Acute on chronic combined systolic and diastolic congestive heart failure ([address] )    COVID-19 virus infection    Hypokalemia    Arthralgia of multiple joints    Polyarticular arthritis      HPI:   Helen Brown is a 79-year-old woman with PMH as below who presented to [facility] on 01/05/2022 with worsening SOB/SINGH for 3 weeks in setting of poor adherence to outpatient HF medications  NT-proBNP 6804; started on IV Lasix and restarted on losartan  Patient seen and examined  Stopped taking cardiac medications about 2 months after initial diagnosis in 05/2021  Heart failure service was consulted for "nonischemic cardiomyopathy " Patient does not follow with outpatient cardiology; no show to appointment in 05/2021  Today's Plan:   Continue IV Lasix 20 mg BID   Potassium 2 8 this AM (magnesium of 2 5)  Received 60 mEq IV potassium and 40 mEq PO potassium today   Repeat serum K level of 3 4; start PO potassium 20 mEq BID   Will hold off on restarting BB today   Check UDS   Not a candidate for ICD at this time due to poor adherence  At least 3 consecutive months of optimized medical therapy needed to meet indications for ICD  Plan:  DFECE-02 virus infection   Diagnosed 01/06/2022; not vaccinated  Management per primary team      Acute on chronic HFrEF; LVEF 25%; LVIDd 6 cm; NYHA III; ACC/AHA Stage C   Etiology: uncontrolled HTN + underlying CAD  TTE 05/01/2021: LVEF 25%  LVIDd 6 cm  Grade 2 DD  Normal RV  Mild MR  LHC 05/05/2021: 50% stenosis mid LAD   50% stenosis of OM1 origin  60% stenosis OM1  60% stenosis proximal RCA  cMRI 05/06/2021: LVEF 34%  CO 5 4 L/min  Normal RV  "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 "   TTE 01/06/2022: LVEF 25%  Mildly dilated RV with mildly reduced RVSF  TATIANA  Mild MR and TR  PASP 54 mmHg  Neurohormonal Blockade:  --Beta Blocker: No (prescribed metoprolol succinate 25 mg q12 hours)  --ARNi / ACEi / ARB: losartan 50 mg daily  --Aldosterone Antagonist: No    --SGLT2 Inhibitor: No    --Home Diuretic: PRN Lasix 20 mg   --Inpatient Diuretic: IV Lasix 20 mg BID with potassium 20 mEq BID  Sudden Cardiac Death Risk Reduction:  --LVEF 25%  Refused LifeVest during 05/2021 admission  --Not a candidate for ICD at this time due to poor adherence  At least 3 consecutive months of optimized medical therapy needed to meet indications for ICD  Electrical Resynchronization:  --Candidacy for BiV device: narrow QRS  Advanced Therapies (if appropriate): Will continue to monitor  Hypertension   Tobacco abuse  Chronic pain syndrome  Positive hepatitis C antibody  Unstable housing    Past Medical History:   Diagnosis Date    2019 novel coronavirus disease (COVID-19) 01/06/2022    Coronary artery disease 05/05/2021    [facility] 05/01/2021    Hyperlipidemia     Hypertension     Positive hepatitis C antibody test 05/01/2021    RSD (reflex sympathetic dystrophy)     Tobacco abuse        Review of Systems   Constitutional: Negative for activity change, appetite change, fatigue, fever and unexpected weight change  HENT: Negative for congestion, postnasal drip, rhinorrhea, sneezing, sore throat and trouble swallowing  Eyes: Negative  Respiratory: Positive for shortness of breath  Negative for cough and chest tightness  Cardiovascular: Negative for chest pain, palpitations and leg swelling     Gastrointestinal: Positive for abdominal distention  Negative for abdominal pain, diarrhea, nausea and vomiting  Endocrine: Negative  Genitourinary: Negative for decreased urine volume, difficulty urinating, dysuria and urgency  Musculoskeletal: Positive for arthralgias and myalgias  Skin: Negative  Allergic/Immunologic: Negative  Neurological: Negative for dizziness, tremors, syncope, weakness, light-headedness and headaches  Hematological: Negative  Psychiatric/Behavioral: Negative for agitation, confusion and sleep disturbance  The patient is not nervous/anxious  14-point ROS completed and negative except as stated above and/or in the HPI      Current Facility-Administered Medications:     acetaminophen (TYLENOL) tablet 650 mg, 650 mg, Oral, Q6H PRN, Lydia Colby MD, 650 mg at 01/06/22 0935    albuterol (PROVENTIL HFA,VENTOLIN HFA) inhaler 2 puff, 2 puff, Inhalation, Q4H PRN, Lydia Colby MD    atorvastatin (LIPITOR) tablet 40 mg, 40 mg, Oral, After Dinner, Lydia Colby MD    Diclofenac Sodium (VOLTAREN) 1 % topical gel 2 g, 2 g, Topical, 4x Daily, Lydia Colby MD, 2 g at 01/06/22 0808    dicyclomine (BENTYL) tablet 20 mg, 20 mg, Oral, 4x Daily (AC & HS), Meggan Hansen MD, 20 mg at 01/06/22 1352    enoxaparin (LOVENOX) subcutaneous injection 40 mg, 40 mg, Subcutaneous, Daily, Lydia Colby MD, 40 mg at 01/06/22 0808    furosemide (LASIX) injection 20 mg, 20 mg, Intravenous, BID (diuretic), Zahra Lao PA-C    Labetalol HCl (NORMODYNE) injection 5 mg, 5 mg, Intravenous, Q6H PRN, Meggan Hansen MD    lidocaine (LIDODERM) 5 % patch 1 patch, 1 patch, Topical, Daily, Lydia Colby MD, 1 patch at 01/06/22 0134    losartan (COZAAR) tablet 50 mg, 50 mg, Oral, Daily, Lydia Colby MD, 50 mg at 01/06/22 0807    nicotine (NICODERM CQ) 7 mg/24hr TD 24 hr patch 1 patch, 1 patch, Transdermal, Daily, Lydia Colby MD, 1 patch at 01/06/22 0825    ondansetron (ZOFRAN) injection 4 mg, 4 mg, Intravenous, Q6H PRN, Baldomero Dawson MD    potassium chloride (K-DUR,KLOR-CON) CR tablet 20 mEq, 20 mEq, Oral, BID, Pranav Cavazos PA-C    Current Outpatient Medications:     albuterol (PROVENTIL HFA,VENTOLIN HFA) 90 mcg/act inhaler, Inhale 2 puffs every 4 (four) hours as needed for wheezing, Disp: 8 g, Rfl: 1    atorvastatin (LIPITOR) 40 mg tablet, TAKE 1 TABLET (40 MG TOTAL) BY MOUTH DAILY AFTER DINNER, Disp: 30 tablet, Rfl: 0    calcium carbonate (TUMS) 500 mg chewable tablet, Chew 2 tablets (1,000 mg total) daily as needed for indigestion or heartburn, Disp: 30 tablet, Rfl: 0    dicyclomine (BENTYL) 20 mg tablet, TAKE 1 TABLET (20 MG TOTAL) BY MOUTH 3 (THREE) TIMES A DAY BEFORE MEALS, Disp: 90 tablet, Rfl: 0    furosemide (LASIX) 20 mg tablet, TAKE 1 TABLET AS NEEDED PLEASE TAKE 1 TABLET BY MOUTH AS NEEDED FOR LOWER EXTREMITY SWELLING , Disp: 30 tablet, Rfl: 0    losartan (COZAAR) 50 mg tablet, TAKE 1 TABLET BY MOUTH EVERY DAY, Disp: 30 tablet, Rfl: 0    melatonin 3 mg, Take 1 tablet (3 mg total) by mouth daily at bedtime, Disp: 30 tablet, Rfl: 0    metoprolol succinate (TOPROL-XL) 25 mg 24 hr tablet, TAKE 1 TABLET BY MOUTH TWICE A DAY, Disp: 60 tablet, Rfl: 0    nicotine (NICODERM CQ) 7 mg/24hr TD 24 hr patch, Place 1 patch on the skin daily, Disp: 28 patch, Rfl: 0    triamcinolone (KENALOG) 0 1 % cream, Apply topically 2 (two) times a day, Disp: 30 g, Rfl: 1    No Known Allergies     Social History     Socioeconomic History    Marital status:      Spouse name: Not on file    Number of children: Not on file    Years of education: Not on file    Highest education level: Not on file   Occupational History    Not on file   Tobacco Use    Smoking status: Current Every Day Smoker     Packs/day: 1 00     Types: Cigarettes    Smokeless tobacco: Never Used   Vaping Use    Vaping Use: Never used   Substance and Sexual Activity    Alcohol use: No    Drug use: No    Sexual activity: Not on file   Other Topics Concern    Not on file   Social History Narrative    Not on file     Social Determinants of Health     Financial Resource Strain: Not on file   Food Insecurity: Not on file   Transportation Needs: Not on file   Physical Activity: Not on file   Stress: Not on file   Social Connections: Not on file   Intimate Partner Violence: Not on file   Housing Stability: Not on file     History reviewed  No pertinent family history  Vitals:  Blood pressure 151/88, pulse 61, temperature 98 6 °F (37 °C), temperature source Oral, resp  rate 17, height 5' 4" (1 626 m), weight 68 9 kg (152 lb), SpO2 97 %  Body mass index is 26 09 kg/m²  I/O last 3 completed shifts: In: 150 [IV Piggyback:150]  Out: -     Wt Readings from Last 3 Encounters:   01/06/22 68 9 kg (152 lb)   05/07/21 69 kg (152 lb 3 2 oz)   05/06/21 68 8 kg (151 lb 12 oz)       Vitals:    01/06/22 0625 01/06/22 0818 01/06/22 0900 01/06/22 1130   BP: 149/95 (!) 155/103 (!) 155/103 151/88   BP Location: Right arm Right arm  Right arm   Pulse: 70 74 74 61   Resp: 20 20  17   Temp:       TempSrc:       SpO2: 98% 98%  97%   Weight:   68 9 kg (152 lb)    Height:   5' 4" (1 626 m)        Physical Exam: see attestation for PE     Central Line (day, reason): No   Saravia Catheter (day, reason): No      Labs & Results:  Results from last 7 days   Lab Units 01/06/22  0027   CK TOTAL U/L 40     Results from last 7 days   Lab Units 01/06/22  0540 01/05/22 2001   WBC Thousand/uL 5 53 6 87   HEMOGLOBIN g/dL 12 2 13 5   HEMATOCRIT % 36 7 39 8   PLATELETS Thousands/uL 210 220         Results from last 7 days   Lab Units 01/06/22  1428 01/06/22  0540 01/05/22 2001   POTASSIUM mmol/L 3 4* 2 8* 3 2*   CHLORIDE mmol/L  --  106 107   CO2 mmol/L  --  28 24   BUN mg/dL  --  16 12   CREATININE mg/dL  --  1 00 0 93   CALCIUM mg/dL  --  8 8 9 8   ALK PHOS U/L  --   --  92   ALT U/L  --   --  31   AST U/L  --   --  Leslie Coronado 150, PAJuanC

## 2022-01-06 NOTE — ASSESSMENT & PLAN NOTE
H/o smoking  Currently smokes 4 cigarettes per day    - Patient provided with nicotine patch 7 mg while inpatient

## 2022-01-06 NOTE — ASSESSMENT & PLAN NOTE
Patient presenting with 3 weeks history of asymmetric polyarticular join pain associated with right knee, left ankle, right wrist, right 1st MCP, left 1st MCP  Patient also reports of mild swelling in these areas along with joint pain  Patient states that the joint pain tends to resolve at one site and then move to another joint  Patient was noted to have a small effusion on the right knee      - ordered uric acid  - ordered lyme panel  - ordered XRs of specific joint sites  - On Voltaren gel for pain relief  - On lidocaine patch for right knee  - warm compresses as tolerated  - consider orthopedic evaluation for joint arthrocentesis

## 2022-01-06 NOTE — ED NOTES
Resp called second request MASSEY neb   Therapist reports she will be down to administer shortly      Mami Siddiqui RN  01/05/22 2052

## 2022-01-06 NOTE — ED ATTENDING ATTESTATION
Gloria Constantino MD, saw and evaluated the patient  I have discussed the patient with the resident and agree with the resident's findings, Plan of Care, and MDM as documented in the resident's note, except where noted  All available labs and Radiology studies were reviewed  I was present for key portions of any procedure(s) performed by the resident and I was immediately available to provide assistance  At this point I agree with the current assessment done in the Emergency Department  I have conducted an independent evaluation of this patient a history and physical is as follows:    64 yo homeless female with a history of hyperlipidemia, HTN, and CHF presents to the ED complaining of shortness of breath, generalized arthralgias, weakness, and occasional chest tightness x 2-3 weeks  The patient "ran out" of all of her medications several months ago and never followed up with her doctor due to transportation issues  No fevers/chills  She denies cough  (+) Mild bilateral LE swelling, slightly worsened from baseline  No other specific complaints  Gen: NAD, AA&Ox3  HEENT: PERRL, EOMI  Neck: supple  CV: RRR  Lungs: (+) scattered bibasilar rales  Abdomen: soft, NT/ND  Ext: (+) trace symmetrical LE edema  Neuro: 5/5 strength all extremities, sensation grossly intact    ED Course  The patient is comfortable appearing despite her complaints  (+) Scattered rales and trace symmetrical LE edema on exam  She admits to medications noncompliance x several months  CHD exacerbation +/- an infectious process? Will check EKG, CXR, basic labs, troponin, COVID-19 swab, and BNP  The patient will likely require admission to [facility]        Critical Care Time  Procedures

## 2022-01-06 NOTE — PROGRESS NOTES
INTERNAL MEDICINE RESIDENCY  SENIOR ADMISSION NOTE     Unit/BedJef Read   Encounter: [id number]  SOD Team B     PATIENT INFORMATION     Ephraim Otto   63 y o  female   MRN: [id number]  Hospital Stay Days: 1    HISTORY OF PRESENT ILLNESS     Pt is a 65yo F with sig PMH of Combined Systolic and diastolic HF with EF 49%, HTN, HLD, tobacco use, pulmonary nodule who presents with SOB x3 weeks prompting her to present to ED at [facility] on 01/5/22  She admits to noncompliance with her medications  Also found to be COVID -19 + in ED  Fortunately, pt satting well on RA  VSS except notable /91 initially which improved, afebrile  Labs significant for K 3 2 ProBNP > 6,000  Trop x1 128 (trending)    CXR pending read however appears to have vascular congestion and small basilar effusion on left with atelectasis    Given 20 IV Lasix in ED  I/O not charted as of yet  2D Echo pending  Sarah admit under Obs for CHF exacerbation  Can consider HF consult given prior admissions for CHF, noncompliance and possible need for lifevest/ ICD placement if EF not improved compared to prior  Regarding pt's polyarticular asymmetric arthritis, will obtain x-rays, symptomatic management, serum uric acid, Lyme antibody profile, HIV test for know  Would benefit from orthopedic consult for thoracentesis and synovial fluid analysis  ? Possible Secondary to COVID reactive arthritis? Reviewed H&P per Dr Sharyn Bello  Agree with the assessment and plan except as noted below       ASSESSMENT/PLAN     Principal Problem:    SINGH (dyspnea on exertion)  Active Problems:    Hyperlipidemia    Pulmonary nodule    Elevated troponin    Smoker    Hypertension    HFrEF (heart failure with reduced ejection fraction) ([facility])    Acute on chronic combined systolic and diastolic congestive heart failure ([facility] )    COVID-19 virus infection    Hypokalemia    Type 2 MI (myocardial infarction) ([facility] )       Management of remaining chronic medical issues as detailed in H&P by Dr Ilene Drake      Code Status: full code  Diet: cardiac diet   VTE Prophylaxis: lovenox    Disposition: OBSERVATION  Expected LOS: <2 Midnights    ==  Gerald Waite MD  Chief Resident, PGY-3  [facility] Internal Medicine Residency

## 2022-01-06 NOTE — ASSESSMENT & PLAN NOTE
Presenting to ED with hypokalemia, 3 2   - was given 3x 20 mEq IVPB KCl  - K on am labs : 2 8   - repleted with 40 Meq PO x 1, 20 Meq IV x 1  - will continue to monitor with BMP

## 2022-01-06 NOTE — ED NOTES
Pt given phone to call son; pt given inhaler for SOB/wheezing; states she's more SOB when laying down     Brayden Savage RN  01/06/22 0683

## 2022-01-06 NOTE — ASSESSMENT & PLAN NOTE
Wt Readings from Last 3 Encounters:   01/05/22 68 9 kg (152 lb)   05/07/21 69 kg (152 lb 3 2 oz)   05/06/21 68 8 kg (151 lb 12 oz)     Patient with HFrEF 32% presented with 3 week history of shortness of breath, dyspnea on exertion, orthopnea and long-term medication noncompliance at home  Likely concerning for acute on chronic CHF  Patient also had a prior admission in 2021 with similar symptoms   ProBNP at 6804 at this admission     - Plan as above  - On furosemide 20 mg IV bid  - monitor strict I's & O's  - daily standing weights  - trend BMP and sCr  - will correct any electrolyte derangements  - HF team on board   - placed on cardiac diet, low-sodium  - patient may benefit from outpatient cardiology follow-up after discharge

## 2022-01-06 NOTE — ED PROVIDER NOTES
History  Chief Complaint   Patient presents with    Joint Swelling     Patient reports joint swelling and pain for the last few weeks; also complaining of SOB at times as well      HPI    64 yo F, pmhx significant for CHF, hep C, presenting for evaluation of 3 weeks of worsening SOB and diffuse joint pain and swelling  SOB is worsened by laying flat  Patient states that she had similar shortness of breath in the past, was admitted to the hospital and told that she had "fluid in her lungs"  Associated symptoms include mild nonproductive cough  Denies chest pain, abdominal pain, nausea, vomiting, fever, chills, diaphoresis, urinary symptoms  Joint swelling also started 3 weeks ago  Denies any trauma, falls, or injury  Patient admits to medical non-compliance for several months because she has not been able to see her primary care physician  Prior to Admission Medications   Prescriptions Last Dose Informant Patient Reported? Taking? albuterol (PROVENTIL HFA,VENTOLIN HFA) 90 mcg/act inhaler   No No   Sig: Inhale 2 puffs every 4 (four) hours as needed for wheezing   atorvastatin (LIPITOR) 40 mg tablet   No No   Sig: TAKE 1 TABLET (40 MG TOTAL) BY MOUTH DAILY AFTER DINNER   calcium carbonate (TUMS) 500 mg chewable tablet   No No   Sig: Chew 2 tablets (1,000 mg total) daily as needed for indigestion or heartburn   dicyclomine (BENTYL) 20 mg tablet   No No   Sig: TAKE 1 TABLET (20 MG TOTAL) BY MOUTH 3 (THREE) TIMES A DAY BEFORE MEALS   furosemide (LASIX) 20 mg tablet   No No   Sig: TAKE 1 TABLET AS NEEDED PLEASE TAKE 1 TABLET BY MOUTH AS NEEDED FOR LOWER EXTREMITY SWELLING     losartan (COZAAR) 50 mg tablet   No No   Sig: TAKE 1 TABLET BY MOUTH EVERY DAY   melatonin 3 mg   No No   Sig: Take 1 tablet (3 mg total) by mouth daily at bedtime   metoprolol succinate (TOPROL-XL) 25 mg 24 hr tablet   No No   Sig: TAKE 1 TABLET BY MOUTH TWICE A DAY   nicotine (NICODERM CQ) 7 mg/24hr TD 24 hr patch   No No   Sig: Place 1 patch on the skin daily   triamcinolone (KENALOG) 0 1 % cream   No No   Sig: Apply topically 2 (two) times a day      Facility-Administered Medications: None       Past Medical History:   Diagnosis Date    2019 novel coronavirus disease (COVID-19) 01/06/2022    Coronary artery disease 05/05/2021    HFrEF (heart failure with reduced ejection fraction) ([facility] [address] ) 05/01/2021    Hyperlipidemia     Hypertension     Positive hepatitis C antibody test 05/01/2021    RSD (reflex sympathetic dystrophy)     Tobacco abuse        Past Surgical History:   Procedure Laterality Date    BREAST SURGERY         History reviewed  No pertinent family history  I have reviewed and agree with the history as documented  E-Cigarette/Vaping    E-Cigarette Use Never User      E-Cigarette/Vaping Substances     Social History     Tobacco Use    Smoking status: Current Every Day Smoker     Packs/day: 1 00     Types: Cigarettes    Smokeless tobacco: Never Used   Vaping Use    Vaping Use: Never used   Substance Use Topics    Alcohol use: No    Drug use: No        Review of Systems   Constitutional: Positive for fatigue  Negative for chills and fever  HENT: Negative for congestion and sore throat  Respiratory: Positive for cough and shortness of breath  Negative for chest tightness  Cardiovascular: Negative for chest pain, palpitations and leg swelling  Gastrointestinal: Negative for abdominal pain, constipation, diarrhea, nausea and vomiting  Genitourinary: Negative for dysuria, frequency and urgency  Musculoskeletal: Positive for arthralgias and joint swelling  Negative for back pain and myalgias  Neurological: Negative for dizziness, weakness, light-headedness, numbness and headaches  All other systems reviewed and are negative        Physical Exam  ED Triage Vitals [01/05/22 1720]   Temperature Pulse Respirations Blood Pressure SpO2   98 6 °F (37 °C) 93 18 (!) 183/91 96 %      Temp Source Heart Rate Source Patient Position - Orthostatic VS BP Location FiO2 (%)   Oral Monitor Sitting Left arm --      Pain Score       10 - Worst Possible Pain             Orthostatic Vital Signs  Vitals:    01/06/22 1130 01/06/22 1515 01/06/22 1733 01/06/22 2154   BP: 151/88  (!) 147/102 151/94   Pulse: 61 82 68 78   Patient Position - Orthostatic VS: Lying  Lying Sitting       Physical Exam  Vitals and nursing note reviewed  Constitutional:       General: She is not in acute distress  Appearance: Normal appearance  She is not ill-appearing or toxic-appearing  HENT:      Head: Normocephalic and atraumatic  Right Ear: External ear normal       Left Ear: External ear normal       Nose: Nose normal       Mouth/Throat:      Mouth: Mucous membranes are moist       Pharynx: Oropharynx is clear  Eyes:      General: No scleral icterus  Extraocular Movements: Extraocular movements intact  Cardiovascular:      Rate and Rhythm: Normal rate and regular rhythm  Pulses: Normal pulses  Pulmonary:      Effort: Pulmonary effort is normal  No respiratory distress  Breath sounds: Rales (scattered) present  Abdominal:      General: Abdomen is flat  Palpations: Abdomen is soft  Tenderness: There is no abdominal tenderness  There is no right CVA tenderness, left CVA tenderness, guarding or rebound  Musculoskeletal:         General: Normal range of motion  Cervical back: Normal range of motion and neck supple  Skin:     General: Skin is warm  Capillary Refill: Capillary refill takes less than 2 seconds  Neurological:      General: No focal deficit present  Mental Status: She is alert and oriented to person, place, and time           ED Medications  Medications   atorvastatin (LIPITOR) tablet 40 mg (40 mg Oral Given 1/6/22 1724)   albuterol (PROVENTIL HFA,VENTOLIN HFA) inhaler 2 puff (2 puffs Inhalation Given 1/6/22 1734)   losartan (COZAAR) tablet 50 mg (50 mg Oral Given 1/6/22 0807)   ondansetron [facility]) injection 4 mg (0 mg Intravenous Hold 1/6/22 1735)   acetaminophen (TYLENOL) tablet 650 mg (650 mg Oral Given 1/6/22 0935)   nicotine (NICODERM CQ) 7 mg/24hr TD 24 hr patch 1 patch (1 patch Transdermal Medication Applied 1/6/22 0825)   enoxaparin (LOVENOX) subcutaneous injection 40 mg (40 mg Subcutaneous Given 1/6/22 0808)   Labetalol HCl (NORMODYNE) injection 5 mg (has no administration in time range)   Diclofenac Sodium (VOLTAREN) 1 % topical gel 2 g (2 g Topical Given 1/6/22 2123)   lidocaine (LIDODERM) 5 % patch 1 patch (1 patch Topical Patch Removed 1/6/22 1353)   dicyclomine (BENTYL) tablet 20 mg (20 mg Oral Given 1/6/22 2122)   potassium chloride (K-DUR,KLOR-CON) CR tablet 20 mEq (20 mEq Oral Given 1/6/22 1725)   furosemide (LASIX) injection 20 mg (20 mg Intravenous Given 1/6/22 1639)   melatonin tablet 3 mg (has no administration in time range)   albuterol inhalation solution 10 mg (10 mg Nebulization Given 1/5/22 2110)     And   ipratropium (ATROVENT) 0 02 % inhalation solution 1 mg (1 mg Nebulization Given 1/5/22 2109)     And   sodium chloride 0 9 % inhalation solution 3 mL (3 mL Nebulization Given 1/5/22 2110)   furosemide (LASIX) injection 20 mg (20 mg Intravenous Given 1/5/22 2114)   acetaminophen (TYLENOL) tablet 650 mg (650 mg Oral Given 1/5/22 2315)   ketorolac (TORADOL) injection 15 mg (15 mg Intravenous Given 1/5/22 2314)   potassium chloride 20 mEq IVPB (premix) (0 mEq Intravenous Stopped 1/6/22 0534)   potassium chloride 20 mEq IVPB (premix) (0 mEq Intravenous Stopped 1/6/22 0800)   magnesium sulfate 2 g/50 mL IVPB (premix) 2 g (0 g Intravenous Stopped 1/6/22 0338)   perflutren lipid microsphere (DEFINITY) injection (0 6 mL/min Intravenous Given 1/6/22 0910)   potassium chloride (K-DUR,KLOR-CON) CR tablet 40 mEq (40 mEq Oral Given 1/6/22 1352)   lidocaine (PF) (XYLOCAINE-MPF) 1 % injection 10 mL (10 mL Infiltration Given 1/6/22 1352)       Diagnostic Studies  Results Reviewed Procedure Component Value Units Date/Time    CBC and differential [[id number]]     Lab Status: No result Specimen: Blood     Basic metabolic panel [[id number]]     Lab Status: No result Specimen: Blood     Sedimentation rate, automated [[id number]]     Lab Status: No result Specimen: Blood     Potassium [[id number]]  (Abnormal) Collected: 01/06/22 1428    Lab Status: Final result Specimen: Blood from Arm, Right Updated: 01/06/22 1456     Potassium 3 4 mmol/L     Toxicology screen, urine [[id number]] Collected: 01/06/22 1428    Lab Status: In process Specimen: Urine, Clean Catch Updated: 92/44/85 3504    Cyclic citrul peptide antibody, IgG [[id number]]     Lab Status: No result Specimen: Blood     Chronic Hepatitis Panel [[id number]]  (Abnormal) Collected: 01/06/22 0540    Lab Status: Final result Specimen: Blood from Hand, Left Updated: 01/06/22 1108     Hepatitis B Surface Ag Non-reactive     Hepatitis C Ab High Reactive     Hep B C IgM Non-reactive     Hep B Core Total Ab Non-reactive    Magnesium [[id number]]  (Normal) Collected: 01/06/22 0027    Lab Status: Final result Specimen: Blood from Arm, Right Updated: 01/06/22 1010     Magnesium 2 1 mg/dL     TSH, 3rd generation with Free T4 reflex [[id number]]  (Normal) Collected: 01/06/22 0027    Lab Status: Final result Specimen: Blood from Arm, Right Updated: 01/06/22 1010     TSH 3RD GENERATON 1 520 uIU/mL     Narrative:      Patients undergoing fluorescein dye angiography may retain small amounts of fluorescein in the body for 48-72 hours post procedure  Samples containing fluorescein can produce falsely depressed TSH values  If the patient had this procedure,a specimen should be resubmitted post fluorescein clearance        CK (with reflex to MB) [[id number]]  (Normal) Collected: 01/06/22 0027    Lab Status: Final result Specimen: Blood from Arm, Right Updated: 01/06/22 1010     Total CK 40 U/L     Uric acid [[id number]]  (Normal) Collected: 01/06/22 0027    Lab Status: Final result Specimen: Blood from Arm, Right Updated: 01/06/22 1010     Uric Acid 3 7 mg/dL     Procalcitonin Reflex [[id number]]  (Normal) Collected: 01/06/22 0540    Lab Status: Final result Specimen: Blood from Hand, Left Updated: 01/06/22 0720     Procalcitonin <0 05 ng/ml     Basic metabolic panel [[id number]]  (Abnormal) Collected: 01/06/22 0540    Lab Status: Final result Specimen: Blood from Hand, Left Updated: 01/06/22 0618     Sodium 141 mmol/L      Potassium 2 8 mmol/L      Chloride 106 mmol/L      CO2 28 mmol/L      ANION GAP 7 mmol/L      BUN 16 mg/dL      Creatinine 1 00 mg/dL      Glucose 123 mg/dL      Calcium 8 8 mg/dL      eGFR 61 ml/min/1 73sq m     Narrative:      [facility] guidelines for Chronic Kidney Disease (CKD):     Stage 1 with normal or high GFR (GFR > 90 mL/min/1 73 square meters)    Stage 2 Mild CKD (GFR = 60-89 mL/min/1 73 square meters)    Stage 3A Moderate CKD (GFR = 45-59 mL/min/1 73 square meters)    Stage 3B Moderate CKD (GFR = 30-44 mL/min/1 73 square meters)    Stage 4 Severe CKD (GFR = 15-29 mL/min/1 73 square meters)    Stage 5 End Stage CKD (GFR <15 mL/min/1 73 square meters)  Note: GFR calculation is accurate only with a steady state creatinine    Magnesium [[id number]]  (Normal) Collected: 01/06/22 0540    Lab Status: Final result Specimen: Blood from Hand, Left Updated: 01/06/22 0618     Magnesium 2 5 mg/dL     Phosphorus [[id number]]  (Normal) Collected: 01/06/22 0540    Lab Status: Final result Specimen: Blood from Hand, Left Updated: 01/06/22 0618     Phosphorus 3 3 mg/dL     CBC and differential [[id number]] Collected: 01/06/22 0540    Lab Status: Final result Specimen: Blood from Hand, Left Updated: 01/06/22 0559     WBC 5 53 Thousand/uL      RBC 4 08 Million/uL      Hemoglobin 12 2 g/dL      Hematocrit 36 7 %      MCV 90 fL      MCH 29 9 pg      MCHC 33 2 g/dL      RDW 13 2 %      MPV 11 1 fL      Platelets 632 Thousands/uL      nRBC 0 /100 WBCs      Neutrophils Relative 63 %      Immat GRANS % 0 %      Lymphocytes Relative 26 %      Monocytes Relative 10 %      Eosinophils Relative 1 %      Basophils Relative 0 %      Neutrophils Absolute 3 50 Thousands/µL      Immature Grans Absolute 0 01 Thousand/uL      Lymphocytes Absolute 1 41 Thousands/µL      Monocytes Absolute 0 54 Thousand/µL      Eosinophils Absolute 0 05 Thousand/µL      Basophils Absolute 0 02 Thousands/µL     Parvovirus B19 antibody, IgG and IgM [[id number]] Collected: 01/06/22 0540    Lab Status: In process Specimen: Blood from Hand, Left Updated: 01/06/22 0544    HIV 1/2 ANTIGEN/ANTIBODY (Kadie Swartz) W REFLEX [facility] [[id number]] Collected: 01/06/22 0540    Lab Status: In process Specimen: Blood from Hand, Left Updated: 01/06/22 0544    Procalcitonin with AM Reflex [[id number]]  (Normal) Collected: 01/06/22 0027    Lab Status: Final result Specimen: Blood from Arm, Right Updated: 01/06/22 0529     Procalcitonin <0 05 ng/ml     HS Troponin I 4hr [[id number]]  (Abnormal) Collected: 01/06/22 0027    Lab Status: Final result Specimen: Blood from Arm, Right Updated: 01/06/22 0150     hs TnI 4hr 106 ng/L      Delta 4hr hsTnI -22 ng/L     C-reactive protein [[id number]]  (Abnormal) Collected: 01/06/22 0027    Lab Status: Final result Specimen: Blood from Arm, Right Updated: 01/06/22 0111     CRP 15 0 mg/L     HS Troponin I 2hr [[id number]]  (Abnormal) Collected: 01/05/22 2228    Lab Status: Final result Specimen: Blood from Arm, Right Updated: 01/06/22 0052     hs TnI 2hr 110 ng/L      Delta 2hr hsTnI -18 ng/L     Lyme Antibody Profile with reflex to [facility] [[id number]] Collected: 01/05/22 2228    Lab Status:  In process Specimen: Blood from Arm, Right Updated: 01/05/22 2238    HS Troponin 0hr (reflex protocol) [[id number]]  (Abnormal) Collected: 01/05/22 2001    Lab Status: Final result Specimen: Blood from Arm, Right Updated: 01/05/22 2217     hs TnI 0hr 128 ng/L     Comprehensive metabolic panel [[id number]]  (Abnormal) Collected: 01/05/22 2001    Lab Status: Final result Specimen: Blood from Arm, Right Updated: 01/05/22 2137     Sodium 140 mmol/L      Potassium 3 2 mmol/L      Chloride 107 mmol/L      CO2 24 mmol/L      ANION GAP 9 mmol/L      BUN 12 mg/dL      Creatinine 0 93 mg/dL      Glucose 104 mg/dL      Calcium 9 8 mg/dL      AST 26 U/L      ALT 31 U/L      Alkaline Phosphatase 92 U/L      Total Protein 8 2 g/dL      Albumin 3 7 g/dL      Total Bilirubin 1 35 mg/dL      eGFR 67 ml/min/1 73sq m     Narrative:      [facility] guidelines for Chronic Kidney Disease (CKD):     Stage 1 with normal or high GFR (GFR > 90 mL/min/1 73 square meters)    Stage 2 Mild CKD (GFR = 60-89 mL/min/1 73 square meters)    Stage 3A Moderate CKD (GFR = 45-59 mL/min/1 73 square meters)    Stage 3B Moderate CKD (GFR = 30-44 mL/min/1 73 square meters)    Stage 4 Severe CKD (GFR = 15-29 mL/min/1 73 square meters)    Stage 5 End Stage CKD (GFR <15 mL/min/1 73 square meters)  Note: GFR calculation is accurate only with a steady state creatinine    NT-BNP PRO [[id number]]  (Abnormal) Collected: 01/05/22 2001    Lab Status: Final result Specimen: Blood from Arm, Right Updated: 01/05/22 2137     NT-proBNP 6,804 pg/mL     COVID/FLU/RSV - 2 hour TAT [[id number]]  (Abnormal) Collected: 01/05/22 2001    Lab Status: Final result Specimen: Nares from Nasopharyngeal Swab Updated: 01/05/22 2114     SARS-CoV-2 Positive     INFLUENZA A PCR Negative     INFLUENZA B PCR Negative     RSV PCR Negative    Narrative:      FOR PEDIATRIC PATIENTS - copy/paste COVID Guidelines URL to browser: [email or web address]    SARS-CoV-2 assay is a Nucleic Acid Amplification assay intended for the  qualitative detection of nucleic acid from SARS-CoV-2 in nasopharyngeal  swabs  Results are for the presumptive identification of SARS-CoV-2 RNA      Positive results are indicative of infection with SARS-CoV-2, the virus  causing COVID-19, but do not rule out bacterial infection or co-infection  with other viruses  Laboratories within the [place] and its  territories are required to report all positive results to the appropriate  public health authorities  Negative results do not preclude SARS-CoV-2  infection and should not be used as the sole basis for treatment or other  patient management decisions  Negative results must be combined with  clinical observations, patient history, and epidemiological information  This test has not been FDA cleared or approved  This test has been authorized by FDA under an Emergency Use Authorization  (EUA)  This test is only authorized for the duration of time the  declaration that circumstances exist justifying the authorization of the  emergency use of an in vitro diagnostic tests for detection of SARS-CoV-2  virus and/or diagnosis of COVID-19 infection under section 564(b)(1) of  the Act, 21 U  S C  713HIP-8(J)(9), unless the authorization is terminated  or revoked sooner  The test has been validated but independent review by FDA  and CLIA is pending  Test performed using [facility] ([facility] GeneXpert: This RT-PCR assay targets N2,  a region unique to SARS-CoV-2  A conserved region in the E-gene was chosen  for pan-Sarbecovirus detection which includes SARS-CoV-2      CBC and differential [[id number]] Collected: 01/05/22 2001    Lab Status: Final result Specimen: Blood from Arm, Right Updated: 01/05/22 2015     WBC 6 87 Thousand/uL      RBC 4 48 Million/uL      Hemoglobin 13 5 g/dL      Hematocrit 39 8 %      MCV 89 fL      MCH 30 1 pg      MCHC 33 9 g/dL      RDW 13 3 %      MPV 11 2 fL      Platelets 626 Thousands/uL      nRBC 0 /100 WBCs      Neutrophils Relative 64 %      Immat GRANS % 0 %      Lymphocytes Relative 25 %      Monocytes Relative 10 %      Eosinophils Relative 1 %      Basophils Relative 0 %      Neutrophils Absolute 4 42 Thousands/µL Immature Grans Absolute 0 02 Thousand/uL      Lymphocytes Absolute 1 68 Thousands/µL      Monocytes Absolute 0 66 Thousand/µL      Eosinophils Absolute 0 07 Thousand/µL      Basophils Absolute 0 02 Thousands/µL                  XR knee 3 vw right non injury   Final Result by Mario Case DO (01/06 1718)      No acute osseous abnormality  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  8 mm intra-articular loose body suggested  Workstation performed: [id number]         XR ankle 3+ vw left   Final Result by Mario Case DO (01/06 1198)      No acute osseous abnormality  Degenerative changes as described  Workstation performed: [id number]         XR wrist 3+ vw right   Final Result by Kaz Garrison MD (01/06 0908)      No acute osseous abnormality  Workstation performed: [id number]         XR chest 1 view portable   Final Result by Obdulia Dawkins MD (01/06 0818)      Cardiomegaly  CHF  Workstation performed: [id number]         XR hand 3+ vw left    (Results Pending)   XR hand 3+ vw right    (Results Pending)         Procedures  Procedures      ED Course  ED Course as of 01/07/22 0035 Wed Jan 05, 2022 2122 SARS-COV-2(!): Positive   2234 hs TnI 0hr(!!): 128             HEART Risk Score      Most Recent Value   Heart Score Risk Calculator    History 0 Filed at: 01/07/2022 0035   ECG 1 Filed at: 01/07/2022 0035   Age 1 Filed at: 01/07/2022 0035   Risk Factors 2 Filed at: 01/07/2022 0035   Troponin 2 Filed at: 01/07/2022 0035   HEART Score 6 Filed at: 01/07/2022 0035                                MDM  Number of Diagnoses or Management Options  COVID-19  Heart failure [facility])  Diagnosis management comments: 64 yo F presenting for 3 weeks of SOB and joint pain/ swelling  Physical exam is significant for scattered rales b/l  I did not appreciate any joint swelling, joints nontender to palpation, no redness   I suspect that the patient is having a CHF exacerbation given her medical non-compliance  Also on my differential are ACS, COPD exacerbation, COVID, bacterial pna, PTX  Will get lyme titer for polyarthralgia  Plan: CBC, CMP, trop, EKG, CXR, BNP, COVID/Flu/RSV, lyme titer  Patient will require admission  Patient's CXR shows pulm edema on my interpretation, will give IV lasix 20  Case was discussed with medicine, medicine to accept the patient  Disposition  Final diagnoses:   Heart failure ([place] [address] )   COVID-19     Time reflects when diagnosis was documented in both MDM as applicable and the Disposition within this note     Time User Action Codes Description Comment    1/5/2022 10:57 PM [place] Saber Add [I50 9] Heart failure ([facility] )     1/5/2022 10:57 PM Papo Saber Add [U07 1] COVID-19     1/6/2022 11:02 AM Vemparala, Pranathi Add [I42 8] Nonischemic cardiomyopathy ([place] [address] )     1/6/2022 11:02 AM Vemparala, Pranathi Add [I50 20] HFrEF (heart failure with reduced ejection fraction) ([place] [address] )     1/6/2022 11:02 AM Vemparala, Pranathi Add [I50 43] Acute on chronic combined systolic and diastolic congestive heart failure ([place] [address] )     1/6/2022 12:03 PM Vemparala, Pranathi Add [M25 50] Arthralgia of multiple joints       ED Disposition     ED Disposition Condition Date/Time Comment    Admit Stable Wed Jan 5, 2022 11:05 PM Case was discussed with Dr Gerard Dorsey and the patient's admission status was agreed to be Admission Status: observation status to the service of Dr Sridevi Avendaño   Follow-up Information    None         Patient's Medications   Discharge Prescriptions    No medications on file     No discharge procedures on file  PDMP Review     None           ED Provider  Attending physically available and evaluated Rock Magana  I managed the patient along with the ED Attending      Electronically Signed by         Raffaele Hirsch,   01/07/22 [address], DO  01/07/22 7062

## 2022-01-06 NOTE — ASSESSMENT & PLAN NOTE
· Patient presenting with 3 weeks history of asymmetric polyarticular joint pain associated with right knee, left ankle, right wrist, right 1st MCP, left 1st MCP  ·  Patient also reports of mild swelling in these areas along with joint pain  · Patient states that the joint pain tends to resolve at one site and then move to another joint  · Patient was noted to have a small effusion on the right knee  · Uric acid : normal, lyme panel : pending   · X ray of the right wrist : degenerative changes of the 1st MCP, X ray rt knee : 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   8 mm intra-articular loose body suggested  · S/p lidocaine patch for right knee   · Pt is Hepatitis C ab +ve     PLAN :   · Will order work up for rheumatoid arthritis, follow pending labs  · Ortho consulted : will do aspiration of knee joint, possibly steroid injection in AM due to 1o osteoarthritis

## 2022-01-06 NOTE — ASSESSMENT & PLAN NOTE
Patient with no history of COVID vaccination, testing positive for COVID on 01/06/2022    - patient currently stable on room air, asymptomatic  - patient denies any fevers or chills  - placed on contact and airborne isolation

## 2022-01-06 NOTE — ASSESSMENT & PLAN NOTE
Patient presented with intermittent generalized abdominal pain along with mild abdominal swelling  Patient was also noted to have similar abdominal symptoms at last admission in 04/2021  At that time, patient was noted to have some atherosclerosis of abdominal vessels (CT abdomen pelvis from that admission)  Her abdominal pain may be correlated to this   She may also benefit from additional diuresis to decrease possible third spacing of fluids in abdomen as a result of CHF   - Patient had mesenteric duplex and RUQ  U/S at previous admission which did not reveal any significant findings  - Was noted to be responsive to bentyl at last admission, ordered bentyl  - Diuresis- 20 mg IV furosemide bid  - Will continue to monitor

## 2022-01-07 PROBLEM — E87.6 HYPOKALEMIA: Status: RESOLVED | Noted: 2022-01-05 | Resolved: 2022-01-07

## 2022-01-07 LAB
ANION GAP SERPL CALCULATED.3IONS-SCNC: 6 MMOL/L (ref 4–13)
B BURGDOR IGG+IGM SER-ACNC: 22
B19V IGG SER IA-ACNC: 0.3 INDEX (ref 0–0.8)
B19V IGM SER IA-ACNC: 0.8 INDEX (ref 0–0.8)
BASOPHILS # BLD AUTO: 0.03 THOUSANDS/ΜL (ref 0–0.1)
BASOPHILS NFR BLD AUTO: 1 % (ref 0–1)
BUN SERPL-MCNC: 23 MG/DL (ref 5–25)
CALCIUM SERPL-MCNC: 9.3 MG/DL (ref 8.3–10.1)
CHLORIDE SERPL-SCNC: 109 MMOL/L (ref 100–108)
CO2 SERPL-SCNC: 27 MMOL/L (ref 21–32)
CREAT SERPL-MCNC: 1.09 MG/DL (ref 0.6–1.3)
EOSINOPHIL # BLD AUTO: 0.08 THOUSAND/ΜL (ref 0–0.61)
EOSINOPHIL NFR BLD AUTO: 1 % (ref 0–6)
ERYTHROCYTE [DISTWIDTH] IN BLOOD BY AUTOMATED COUNT: 13.1 % (ref 11.6–15.1)
ERYTHROCYTE [SEDIMENTATION RATE] IN BLOOD: 46 MM/HOUR (ref 0–29)
GFR SERPL CREATININE-BSD FRML MDRD: 55 ML/MIN/1.73SQ M
GLUCOSE SERPL-MCNC: 118 MG/DL (ref 65–140)
HCT VFR BLD AUTO: 38.5 % (ref 34.8–46.1)
HGB BLD-MCNC: 13.1 G/DL (ref 11.5–15.4)
HIV 1+2 AB+HIV1 P24 AG SERPL QL IA: NORMAL
IMM GRANULOCYTES # BLD AUTO: 0.02 THOUSAND/UL (ref 0–0.2)
IMM GRANULOCYTES NFR BLD AUTO: 0 % (ref 0–2)
LYMPHOCYTES # BLD AUTO: 1.56 THOUSANDS/ΜL (ref 0.6–4.47)
LYMPHOCYTES NFR BLD AUTO: 28 % (ref 14–44)
MAGNESIUM SERPL-MCNC: 2.5 MG/DL (ref 1.6–2.6)
MCH RBC QN AUTO: 30.3 PG (ref 26.8–34.3)
MCHC RBC AUTO-ENTMCNC: 34 G/DL (ref 31.4–37.4)
MCV RBC AUTO: 89 FL (ref 82–98)
MONOCYTES # BLD AUTO: 0.62 THOUSAND/ΜL (ref 0.17–1.22)
MONOCYTES NFR BLD AUTO: 11 % (ref 4–12)
NEUTROPHILS # BLD AUTO: 3.26 THOUSANDS/ΜL (ref 1.85–7.62)
NEUTS SEG NFR BLD AUTO: 59 % (ref 43–75)
NRBC BLD AUTO-RTO: 0 /100 WBCS
PLATELET # BLD AUTO: 205 THOUSANDS/UL (ref 149–390)
PMV BLD AUTO: 12.3 FL (ref 8.9–12.7)
POTASSIUM SERPL-SCNC: 3.7 MMOL/L (ref 3.5–5.3)
RBC # BLD AUTO: 4.33 MILLION/UL (ref 3.81–5.12)
SODIUM SERPL-SCNC: 142 MMOL/L (ref 136–145)
WBC # BLD AUTO: 5.57 THOUSAND/UL (ref 4.31–10.16)

## 2022-01-07 PROCEDURE — 80048 BASIC METABOLIC PNL TOTAL CA: CPT | Performed by: INTERNAL MEDICINE

## 2022-01-07 PROCEDURE — NC001 PR NO CHARGE: Performed by: ORTHOPAEDIC SURGERY

## 2022-01-07 PROCEDURE — 83735 ASSAY OF MAGNESIUM: CPT | Performed by: INTERNAL MEDICINE

## 2022-01-07 PROCEDURE — 85025 COMPLETE CBC W/AUTO DIFF WBC: CPT | Performed by: INTERNAL MEDICINE

## 2022-01-07 PROCEDURE — 99232 SBSQ HOSP IP/OBS MODERATE 35: CPT | Performed by: INTERNAL MEDICINE

## 2022-01-07 PROCEDURE — 99222 1ST HOSP IP/OBS MODERATE 55: CPT | Performed by: ORTHOPAEDIC SURGERY

## 2022-01-07 PROCEDURE — 85652 RBC SED RATE AUTOMATED: CPT | Performed by: ORTHOPAEDIC SURGERY

## 2022-01-07 PROCEDURE — 36415 COLL VENOUS BLD VENIPUNCTURE: CPT | Performed by: ORTHOPAEDIC SURGERY

## 2022-01-07 RX ORDER — SPIRONOLACTONE 25 MG/1
25 TABLET ORAL DAILY
Status: DISCONTINUED | OUTPATIENT
Start: 2022-01-08 | End: 2022-01-09

## 2022-01-07 RX ORDER — AMLODIPINE BESYLATE 5 MG/1
5 TABLET ORAL DAILY
Status: DISCONTINUED | OUTPATIENT
Start: 2022-01-07 | End: 2022-01-07

## 2022-01-07 RX ORDER — LANOLIN ALCOHOL/MO/W.PET/CERES
3 CREAM (GRAM) TOPICAL ONCE
Status: COMPLETED | OUTPATIENT
Start: 2022-01-07 | End: 2022-01-07

## 2022-01-07 RX ORDER — LANOLIN ALCOHOL/MO/W.PET/CERES
3 CREAM (GRAM) TOPICAL
Status: DISCONTINUED | OUTPATIENT
Start: 2022-01-07 | End: 2022-01-07

## 2022-01-07 RX ORDER — ACETAMINOPHEN 325 MG/1
650 TABLET ORAL EVERY 6 HOURS
Status: DISCONTINUED | OUTPATIENT
Start: 2022-01-07 | End: 2022-01-10 | Stop reason: HOSPADM

## 2022-01-07 RX ADMIN — ENOXAPARIN SODIUM 40 MG: 40 INJECTION SUBCUTANEOUS at 08:44

## 2022-01-07 RX ADMIN — DICLOFENAC SODIUM 2 G: 10 GEL TOPICAL at 19:05

## 2022-01-07 RX ADMIN — Medication 3 MG: at 01:58

## 2022-01-07 RX ADMIN — DICYCLOMINE HYDROCHLORIDE 20 MG: 20 TABLET ORAL at 17:08

## 2022-01-07 RX ADMIN — DICLOFENAC SODIUM 2 G: 10 GEL TOPICAL at 08:44

## 2022-01-07 RX ADMIN — DICYCLOMINE HYDROCHLORIDE 20 MG: 20 TABLET ORAL at 06:46

## 2022-01-07 RX ADMIN — FUROSEMIDE 20 MG: 10 INJECTION, SOLUTION INTRAMUSCULAR; INTRAVENOUS at 17:08

## 2022-01-07 RX ADMIN — POTASSIUM CHLORIDE 20 MEQ: 1500 TABLET, EXTENDED RELEASE ORAL at 08:42

## 2022-01-07 RX ADMIN — FUROSEMIDE 20 MG: 10 INJECTION, SOLUTION INTRAMUSCULAR; INTRAVENOUS at 08:38

## 2022-01-07 RX ADMIN — DICYCLOMINE HYDROCHLORIDE 20 MG: 20 TABLET ORAL at 11:23

## 2022-01-07 RX ADMIN — LOSARTAN POTASSIUM 50 MG: 50 TABLET, FILM COATED ORAL at 08:42

## 2022-01-07 RX ADMIN — ACETAMINOPHEN 650 MG: 325 TABLET, FILM COATED ORAL at 17:09

## 2022-01-07 RX ADMIN — ACETAMINOPHEN 650 MG: 325 TABLET, FILM COATED ORAL at 08:42

## 2022-01-07 RX ADMIN — NICOTINE 1 PATCH: 7 PATCH, EXTENDED RELEASE TRANSDERMAL at 08:41

## 2022-01-07 RX ADMIN — POTASSIUM CHLORIDE 20 MEQ: 1500 TABLET, EXTENDED RELEASE ORAL at 17:08

## 2022-01-07 RX ADMIN — AMLODIPINE BESYLATE 5 MG: 5 TABLET ORAL at 08:42

## 2022-01-07 RX ADMIN — DICLOFENAC SODIUM 2 G: 10 GEL TOPICAL at 11:23

## 2022-01-07 RX ADMIN — ATORVASTATIN CALCIUM 40 MG: 40 TABLET, FILM COATED ORAL at 19:04

## 2022-01-07 RX ADMIN — LIDOCAINE 5% 1 PATCH: 700 PATCH TOPICAL at 17:17

## 2022-01-07 NOTE — CASE MANAGEMENT
Case Management Assessment & Discharge Planning Note    Patient name Jacklyn Bravo  Location ED 09/ED 09 MRN [id number]  : 1960 Date 2022       Current Admission Date: 2022  Current Admission Diagnosis:SINGH (dyspnea on exertion)   Patient Active Problem List    Diagnosis Date Noted    Arthralgia of multiple joints 2022    Polyarticular arthritis 2022    HFrEF (heart failure with reduced ejection fraction) ([facility] ) 2022    Acute on chronic combined systolic and diastolic congestive heart failure ([facility] ) 2022    COVID-19 virus infection 2022    Type 2 MI (myocardial infarction) ([facility] ) 2022    Nonischemic cardiomyopathy ([place] [address] ) 2022    Fever 2021    New onset of congestive heart failure ([place] [address] ) 2021    Smoker 2021    Hypertension 2021    Shortness of breath 2021    Hypertensive urgency 2021    Positive D dimer 2021    Abnormal finding on imaging of liver 2021    Abdominal pain 2021    Elevated troponin 2021    SINGH (dyspnea on exertion) 2021    Hyperlipidemia     Pulmonary nodule       LOS (days): 1  Geometric Mean LOS (GMLOS) (days):   Days to GMLOS:     OBJECTIVE:         Current admission status: Observation  Referral Reason: Homeless    Preferred Pharmacy:   [facility]/pharmacy [address] [place] [address]  Phone: [phone] Fax: [phone]    Primary Care Provider: No primary care provider on file      Primary Insurance: Donna Mi MA JONNIE  Secondary Insurance:     ASSESSMENT:  [address] [place] [address] 1737 Representative - Son   Primary Phone: [phone] (Home)               Patient Information  Admitted from[de-identified] Home  Mental Status: Alert  During Assessment patient was accompanied by: Not accompanied during assessment  Assessment information provided by[de-identified] Son  Primary Caregiver: Family  Caregiver's Name[de-identified] Reji Dinero  Caregiver's Relationship to Patient[de-identified] Family Member  Caregiver's Telephone Number[de-identified] [phone]  Support Systems: Family members  Type of Current Residence: Homeless  In the last 12 months, was there a time when you were not able to pay the mortgage or rent on time?: Yes  In the last 12 months, was there a time when you did not have a steady place to sleep or slept in a shelter (including now)?: Yes  Homeless/housing insecurity resource given?: Yes (CM offered housing support to pt's son during Open, declined  CM will continue to attempt to reach pt )  Living Arrangements: Lives w/ Son    Activities of Daily Living Prior to Admission  Functional Status: Independent  Completes ADLs independently?: Yes  Ambulates independently?: Yes  Does patient use assisted devices?: Yes  Assisted Devices (DME) used: Straight Cane  Does patient currently own DME?: Yes  What DME does the patient currently own?: Straight Cane  Does patient have a history of Outpatient Therapy (PT/OT)?: No  Does the patient have a history of Short-Term Rehab?: No  Does patient have a history of HHC?: No  Does patient currently have [address]?: No         Patient Information Continued  Income Source: SSI/SSD  Does patient have prescription coverage?: Yes  Within the past 12 months, you worried that your food would run out before you got the money to buy more : Sometimes true  Within the past 12 months, the food you bought just didnt last and you didnt have money to get more : Sometimes true  Food insecurity resource given?: Yes (CM offered resources to pt's son during Open but they were declined    CM will continue to attempt to reach pt to follow up on needs)  Does patient receive dialysis treatments?: No  Does patient have a history of substance abuse?: No  Does patient have a history of Mental Health Diagnosis?: No         Means of Transportation  Means of Transport to Appts[de-identified] Drives Self  In the past 12 months, has lack of transportation kept you from medical appointments or from getting medications?: No  In the past 12 months, has lack of transportation kept you from meetings, work, or from getting things needed for daily living?: No  Was application for public transport provided?: No        DISCHARGE DETAILS:    Discharge planning discussed with[de-identified] Madina Carter  [place] of Choice: Yes  Comments - Freedom of Choice: CM discussed d/c with options with son, if pt d/c's to "home", she will be returing to the [place] that she and Latoya Cole live in  Latoya Douglas is aware that pt will likely be evaluated by PT/OT due to the joint pain/arthrisis and recommendations will be made  CM contacted family/caregiver?: Yes (CM attempted to reach pt multiple times by cell phone with no answer or return phone call )     Contacts  Patient Contacts: Latoya Cole  Relationship to Patient[de-identified] Family  Contact Method: Phone  Phone Number: [phone]  Reason/Outcome: Emergency Contact,Discharge Planning       Other Referral/Resources/Interventions Provided:  Referral Comments: offered homeless resources and food resources     Transport at Discharge : Family      Additional Comments: CM attempted to reach pt multiples this morning and left anohter VM  CM is not able to enter pt's room due to [place] and pt does not have a hospital phone in ED room  CM reached out to pt's son, Latoya Cole ([phone])  Latoya Cole completed Open with CM and states that he and his mother live in a [place] together  Latoya Cole states that when they need to shower he gets a hotel room  Latoya Jimenezcelina declined housing and food resources at this time but CM will continue to try to reach to pt to discuss same

## 2022-01-07 NOTE — PHYSICIAN ADVISOR
Current patient class: Observation  The patient is currently on Hospital Day: 3 at [address] [place] [address]      This patient was originally admitted to the hospital under observation class  After admission the patient was reevaluated and determined to require further hospitalization  After review of the relevant documentation, labs, vital signs and test results, the patient is appropriate for INPATIENT ADMISSION  Admission to the hospital as an inpatient is a complex decision making process which requires the practitioner to consider the patients presenting complaint, history and physical examination and all relevant testing  With this in mind, in this case, the patient was deemed appropriate for INPATIENT ADMISSION  After review of the documentation and testing available at the time of the admission I concur with this clinical determination of medical necessity  Rationale is as follows: The patient is a 77-year-old female who presented to the hospital with several complaints and was diagnosed with acute on chronic heart failure  She reported joint swelling also  The patient presented to the hospital on January 5th, and will be hospitalized be on January 7th  For this reason I recommend change to an inpatient admission  She continues to receive intravenous diuretics  The orthopedics team is involved in her care      The patients vitals on arrival were   ED Triage Vitals [01/05/22 1720]   Temperature Pulse Respirations Blood Pressure SpO2   98 6 °F (37 °C) 93 18 (!) 183/91 96 %      Temp Source Heart Rate Source Patient Position - Orthostatic VS BP Location FiO2 (%)   Oral Monitor Sitting Left arm --      Pain Score       10 - Worst Possible Pain           Past Medical History:   Diagnosis Date    2019 novel coronavirus disease (COVID-19) 01/06/2022    Coronary artery disease 05/05/2021    HFrEF [facility] failure with reduced ejection fraction) [facility] ) 05/01/2021    Hyperlipidemia     Hypertension     Positive hepatitis C antibody test 05/01/2021    RSD (reflex sympathetic dystrophy)     Tobacco abuse      Past Surgical History:   Procedure Laterality Date    BREAST SURGERY         The patient was admitted to the hospital at 10:59 PM on 1/5/22 for the following diagnosis:  Joint swelling [M25 40]    Consults have been placed to:   IP CONSULT TO CASE MANAGEMENT  IP CONSULT TO HEART FAILURE SERVICE  IP CONSULT TO ORTHOPEDIC SURGERY    Vitals:    01/06/22 2154 01/07/22 0637 01/07/22 0842 01/07/22 0845   BP: 151/94 149/92 146/100 146/100   BP Location: Right arm Right arm  Right arm   Pulse: 78 79  76   Resp: (!) 23 18  20   Temp:       TempSrc:       SpO2: 99% 98%  96%   Weight:       Height:           Most recent labs:    Recent Labs     01/05/22 2001 01/06/22  0027 01/06/22  0540 01/07/22  0646   WBC 6 87  --    < > 5 57   HGB 13 5  --    < > 13 1   HCT 39 8  --    < > 38 5     --    < > 205   K 3 2*  --    < > 3 7   CALCIUM 9 8  --    < > 9 3   BUN 12  --    < > 23   CREATININE 0 93  --    < > 1 09   CKTOTAL  --  40  --   --    AST 26  --   --   --    ALT 31  --   --   --    ALKPHOS 92  --   --   --     < > = values in this interval not displayed         Scheduled Meds:  Current Facility-Administered Medications   Medication Dose Route Frequency Provider Last Rate    acetaminophen  650 mg Oral Q6H Helder Singer MD      albuterol  2 puff Inhalation Q4H PRN Devon Nuñez MD      atorvastatin  40 mg Oral After Dinner Devon Nuñez MD      Diclofenac Sodium  2 g Topical 4x Daily Devon Nuñez MD      dicyclomine  20 mg Oral 4x Daily [facility] Miriam Mckee MD      enoxaparin  40 mg Subcutaneous Daily Devon Nuñez MD      furosemide  20 mg Intravenous BID (diuretic) Rashmi Perez PA-C      Labetalol HCl  5 mg Intravenous Q6H PRN Miriam Mckee MD      lidocaine  1 patch Topical Daily Devon Nuñez MD      losartan  50 mg Oral Daily Devon Nuñez, MD      nicotine  1 patch Transdermal Daily Alla Christopher MD      ondansetron  4 mg Intravenous Q6H PRN Alla Christopher MD      potassium chloride  20 mEq Oral BID Poptent, PAJuanC       Continuous Infusions:   PRN Meds: albuterol    Labetalol HCl    ondansetron    Surgical procedures (if appropriate):

## 2022-01-07 NOTE — ASSESSMENT & PLAN NOTE
· Patient presenting with 3 weeks history of asymmetric polyarticular joint pain associated with right knee, left ankle, right wrist, right 1st MCP, left 1st MCP  ·  Patient also reported mild swelling in these areas along with joint pain  · Patient states that the joint pain tends to resolve at one site and then move to another joint  · Patient was noted to have a small effusion on the right knee  · Ortho on board : patient refused knee aspiration   · Uric acid : normal, lyme panel : neg, anti CCP - pending, ESR - pending, parvovirus Ab - pending   · X ray of the right wrist : degenerative changes of the 1st MCP, X ray rt knee : 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   8 mm intra-articular loose body suggested  · S/p lidocaine patch for right knee   · Pt is Hepatitis C ab +ve     PLAN :   · Follow pending labs   · PRN tylenol for OA   · Continue voltaren gel

## 2022-01-07 NOTE — ASSESSMENT & PLAN NOTE
Patient that heart failure with reduced EF, 32% with grade 2 diastolic dysfunction, severe diffuse hypokinesis, changes consistent with eccentric hypertrophy as of 05/2021      - patient is noncompliant with heart failure medications at baseline  - does not follow with a cardiologist in the outpatient setting  - HF team on board   - ECHO ( 1/6/22) shows EF - 25%, severely reduced systolic function, severe global hypokinesis and severely abnormal diastolic function     PLAN :   · Follow management per HF team   · Patient will need to comply with GDMT --> to qualify for ICD if EF remains < / = 35 %   · Will initiate telemetry

## 2022-01-07 NOTE — ASSESSMENT & PLAN NOTE
· Patient presented with intermittent generalized abdominal pain along with mild abdominal swelling  ·  Patient was also noted to have similar abdominal symptoms at last admission in 04/2021  · At that time, patient was noted to have some atherosclerosis of abdominal vessels (CT abdomen pelvis from that admission)  Her abdominal pain may be correlated to this   She may also benefit from additional diuresis to decrease possible third spacing of fluids in abdomen as a result of CHF   - Patient had mesenteric duplex and RUQ  U/S at previous admission which did not reveal any significant findings  - Was noted to be responsive to bentyl at last admission, ordered bentyl  - Diuresis- 20 mg IV furosemide bid  - Will continue to monitor

## 2022-01-07 NOTE — PROGRESS NOTES
[address] [facility]  Progress Note - Ephraim Otto 1960, 63 y o  female MRN: [id number]  Unit/Bed#: ED 09 Encounter: [id number]  Primary Care Provider: No primary care provider on file  Date and time admitted to hospital: 1/5/2022  7:23 PM    Polyarticular arthritis  Assessment & Plan  · Patient presenting with 3 weeks history of asymmetric polyarticular joint pain associated with right knee, left ankle, right wrist, right 1st MCP, left 1st MCP  ·  Patient also reported mild swelling in these areas along with joint pain  · Patient states that the joint pain tends to resolve at one site and then move to another joint  · Patient was noted to have a small effusion on the right knee  · Ortho on board : patient refused knee aspiration   · Uric acid : normal, lyme panel : neg, anti CCP - pending, ESR - pending, parvovirus Ab - pending   · X ray of the right wrist : degenerative changes of the 1st MCP, X ray rt knee : 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  8 mm intra-articular loose body suggested  · S/p lidocaine patch for right knee   · Pt is Hepatitis C ab +ve     PLAN :   · Follow pending labs   · PRN tylenol for OA   · Continue voltaren gel       COVID-19 virus infection  Assessment & Plan  Patient with no history of COVID vaccination, testing positive for COVID on 01/06/2022  - patient currently stable on room air, asymptomatic  - patient denies any fevers or chills  - placed on contact and airborne isolation    Acute on chronic combined systolic and diastolic congestive heart failure (HCC)  Assessment & Plan  Wt Readings from Last 3 Encounters:   01/06/22 68 9 kg (152 lb)   05/07/21 69 kg (152 lb 3 2 oz)   05/06/21 68 8 kg (151 lb 12 oz)     · Patient with HFrEF 32% presented with 3 week history of shortness of breath, dyspnea on exertion, orthopnea and long-term medication noncompliance at home  ·  Likely concerning for acute on chronic CHF  · Patient also had a prior admission in 2021 with similar symptoms  ProBNP at 6804 at this admission  · New EF of 25 %  · HF team on board   · 24 hr I/O : likely inaccurate     - Plan as above  - Follow management per HF team   - monitor strict I's & O's  - daily standing weights  - trend BMP and sCr  - will correct any electrolyte derangements--keep K >4 , Mag > 2   - HF team on board   - placed on cardiac diet, low-sodium  - patient may benefit from outpatient cardiology follow-up after discharge      HFrEF (heart failure with reduced ejection fraction) ([facility] [address] )  Assessment & Plan  Patient that heart failure with reduced EF, 32% with grade 2 diastolic dysfunction, severe diffuse hypokinesis, changes consistent with eccentric hypertrophy as of 05/2021      - patient is noncompliant with heart failure medications at baseline  - does not follow with a cardiologist in the outpatient setting  - HF team on board   - ECHO ( 1/6/22) shows EF - 25%, severely reduced systolic function, severe global hypokinesis and severely abnormal diastolic function     PLAN :   · Follow management per HF team   · Patient will need to comply with GDMT --> to qualify for ICD if EF remains < / = 35 %   · Will initiate telemetry       Hypertension  Assessment & Plan  History of hypertension   - was prescribed losartan 50 mg daily, furosemide 20 mg daily, metoprolol succinate 25 mg daily  Patient is not compliant with medications at home  -Currently on IV furosemide 20 mg b i d , losartan 50 mg daily  - metoprolol on hold in context of acute CHF exacerbation and the fact that patient was not taking meds at home   - BP still uncontrolled : 140-150s SBP / 90-100s DBP   - Continue to monitor, Resume BB when able  Smoker  Assessment & Plan  H/o smoking  Currently smokes 4 cigarettes per day    - Patient provided with nicotine patch 7 mg while inpatient    Elevated troponin  Assessment & Plan  Likely related to myocardial injury secondary to ongoing CHF exacerbation    - Patient currently denies any chest pain or tightness          Pulmonary nodule  Assessment & Plan  · H/o 5 mm right middle lobe nodule  · Based on current Fleischner Society 2017 Guidelines on incidental pulmonary nodule, no routine follow-up is needed if the patient is considered low risk for lung cancer  ·  If the patient is considered high risk for lung cancer, 12 month follow-up non-contrast chest CT is recommended  Hyperlipidemia  Assessment & Plan  On atorvastatin 40 mg qd    Abdominal pain  Assessment & Plan  · Patient presented with intermittent generalized abdominal pain along with mild abdominal swelling  ·  Patient was also noted to have similar abdominal symptoms at last admission in 04/2021  · At that time, patient was noted to have some atherosclerosis of abdominal vessels (CT abdomen pelvis from that admission)  Her abdominal pain may be correlated to this  She may also benefit from additional diuresis to decrease possible third spacing of fluids in abdomen as a result of CHF   - Patient had mesenteric duplex and RUQ  U/S at previous admission which did not reveal any significant findings  - Was noted to be responsive to bentyl at last admission, ordered bentyl  - Diuresis- 20 mg IV furosemide bid  - Will continue to monitor    * SINGH (dyspnea on exertion)  Assessment & Plan  · Patient with h/o HFrEF with last known EF at 32% from 04/2021 and grade 2 diastolic dysfunction, severe diffuse hypokinesis, changes consistent with eccentric hypertrophy  · Patient also had an LHC which revealed non-ischemic cardiomyopathy  · In context of patients recent h/o SOB, orthopnea, abdominal distension, this may be related to CHF exacerbation  · Patient endorsed that she is non-compliant with her medications  Patients Singh maybe related to pulmonary vascular congestion and non-compliance with medications       - Patient was given one time dose of IV lasix 20 mg in ED  - Echo shows new EF of 25%   - HF on board -currently diuresing at 20 mg IV BID   I/ o: likely inaccurate   - Likely moved from NYHA class II to class III due to ongoing CHF exacerbation  -  Currently stable on room air  - Patient has covid infection but appears to be asymptomatic at this time              INTERNAL MEDICINE RESIDENCY PROGRESS NOTE     Name: Macho Lawton   Age & Sex: 63 y o  female   MRN: [id number]  Unit/Bed#: ED 09   Encounter: [id number]  Team: SOD Team B     PATIENT INFORMATION     Name: Macho Lawton   Age & Sex: 63 y o  female   MRN: [id number]  Hospital Stay Days: 1    ASSESSMENT/PLAN     Principal Problem:    SINGH (dyspnea on exertion)  Active Problems:    Abdominal pain    Hyperlipidemia    Pulmonary nodule    Elevated troponin    Smoker    Hypertension    HFrEF (heart failure with reduced ejection fraction) ([facility])    Acute on chronic combined systolic and diastolic congestive heart failure ([facility] [address] )    COVID-19 virus infection    Arthralgia of multiple joints    Polyarticular arthritis      Polyarticular arthritis  Assessment & Plan  · Patient presenting with 3 weeks history of asymmetric polyarticular joint pain associated with right knee, left ankle, right wrist, right 1st MCP, left 1st MCP  ·  Patient also reported mild swelling in these areas along with joint pain  · Patient states that the joint pain tends to resolve at one site and then move to another joint  · Patient was noted to have a small effusion on the right knee  · Ortho on board : patient refused knee aspiration   · Uric acid : normal, lyme panel : neg, anti CCP - pending, ESR - pending, parvovirus Ab - pending   · X ray of the right wrist : degenerative changes of the 1st MCP, X ray rt knee : 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   8 mm intra-articular loose body suggested  · S/p lidocaine patch for right knee   · Pt is Hepatitis C ab +ve     PLAN :   · Follow pending labs   · PRN tylenol for OA   · Continue voltaren gel       COVID-19 virus infection  Assessment & Plan  Patient with no history of COVID vaccination, testing positive for COVID on 01/06/2022  - patient currently stable on room air, asymptomatic  - patient denies any fevers or chills  - placed on contact and airborne isolation    Acute on chronic combined systolic and diastolic congestive heart failure (HCC)  Assessment & Plan  Wt Readings from Last 3 Encounters:   01/06/22 68 9 kg (152 lb)   05/07/21 69 kg (152 lb 3 2 oz)   05/06/21 68 8 kg (151 lb 12 oz)     · Patient with HFrEF 32% presented with 3 week history of shortness of breath, dyspnea on exertion, orthopnea and long-term medication noncompliance at home  ·  Likely concerning for acute on chronic CHF  · Patient also had a prior admission in 2021 with similar symptoms  ProBNP at 6804 at this admission    · New EF of 25 %  · HF team on board   · 24 hr I/O : likely inaccurate     - Plan as above  - Follow management per HF team   - monitor strict I's & O's  - daily standing weights  - trend BMP and sCr  - will correct any electrolyte derangements--keep K >4 , Mag > 2   - HF team on board   - placed on cardiac diet, low-sodium  - patient may benefit from outpatient cardiology follow-up after discharge      HFrEF (heart failure with reduced ejection fraction) ([facility] [address] )  Assessment & Plan  Patient that heart failure with reduced EF, 32% with grade 2 diastolic dysfunction, severe diffuse hypokinesis, changes consistent with eccentric hypertrophy as of 05/2021      - patient is noncompliant with heart failure medications at baseline  - does not follow with a cardiologist in the outpatient setting  - HF team on board   - ECHO ( 1/6/22) shows EF - 25%, severely reduced systolic function, severe global hypokinesis and severely abnormal diastolic function     PLAN :   · Follow management per HF team   · Patient will need to comply with GDMT --> to qualify for ICD if EF remains < / = 35 %   · Will initiate telemetry       Hypertension  Assessment & Plan  History of hypertension   - was prescribed losartan 50 mg daily, furosemide 20 mg daily, metoprolol succinate 25 mg daily  Patient is not compliant with medications at home  -Currently on IV furosemide 20 mg b i d , losartan 50 mg daily  - metoprolol on hold in context of acute CHF exacerbation and the fact that patient was not taking meds at home   - BP still uncontrolled : 140-150s SBP / 90-100s DBP   - Continue to monitor, Resume BB when able  Smoker  Assessment & Plan  H/o smoking  Currently smokes 4 cigarettes per day  - Patient provided with nicotine patch 7 mg while inpatient    Elevated troponin  Assessment & Plan  Likely related to myocardial injury secondary to ongoing CHF exacerbation    - Patient currently denies any chest pain or tightness          Pulmonary nodule  Assessment & Plan  · H/o 5 mm right middle lobe nodule  · Based on current Fleischner Society 2017 Guidelines on incidental pulmonary nodule, no routine follow-up is needed if the patient is considered low risk for lung cancer  ·  If the patient is considered high risk for lung cancer, 12 month follow-up non-contrast chest CT is recommended  Hyperlipidemia  Assessment & Plan  On atorvastatin 40 mg qd    Abdominal pain  Assessment & Plan  · Patient presented with intermittent generalized abdominal pain along with mild abdominal swelling  ·  Patient was also noted to have similar abdominal symptoms at last admission in 04/2021  · At that time, patient was noted to have some atherosclerosis of abdominal vessels (CT abdomen pelvis from that admission)  Her abdominal pain may be correlated to this   She may also benefit from additional diuresis to decrease possible third spacing of fluids in abdomen as a result of CHF   - Patient had mesenteric duplex and RUQ  U/S at previous admission which did not reveal any significant findings  - Was noted to be responsive to bentyl at last admission, ordered bentyl  - Diuresis- 20 mg IV furosemide bid  - Will continue to monitor    * SINGH (dyspnea on exertion)  Assessment & Plan  · Patient with h/o HFrEF with last known EF at 32% from 04/2021 and grade 2 diastolic dysfunction, severe diffuse hypokinesis, changes consistent with eccentric hypertrophy  · Patient also had an LHC which revealed non-ischemic cardiomyopathy  · In context of patients recent h/o SOB, orthopnea, abdominal distension, this may be related to CHF exacerbation  · Patient endorsed that she is non-compliant with her medications  Patients Singh maybe related to pulmonary vascular congestion and non-compliance with medications  - Patient was given one time dose of IV lasix 20 mg in ED  - Echo shows new EF of 25%   - HF on board -currently diuresing at 20 mg IV BID   I/ o: likely inaccurate   - Likely moved from NYHA class II to class III due to ongoing CHF exacerbation  -  Currently stable on room air  - Patient has covid infection but appears to be asymptomatic at this time            Disposition: Pending clinical improvement  Patient is homeless, will need case management assistance  SUBJECTIVE     Patient seen and examined  No acute events overnight  Patient is still c/o "pain all over her body" especially in the joints and in her right knee  Patient says she can try tylenol although she feels it does not work  No chest pain  As per RN, patient was seen walking to and from the bathroom without respiratory distress  No N/V/D  OBJECTIVE     Vitals:    01/06/22 2154 01/07/22 0637 01/07/22 0842 01/07/22 0845   BP: 151/94 149/92 146/100 146/100   BP Location: Right arm Right arm  Right arm   Pulse: 78 79  76   Resp: (!) 23 18  20   Temp:       TempSrc:       SpO2: 99% 98%  96%   Weight:       Height:          Temperature:   No data recorded      Temperature: 98 6 °F (37 °C)  Intake & Output:  I/O       01/05 0701 01/06 0700 01/06 0701 01/07 0700 01/07 0701 01/08 0700 IV Piggyback 150 200     Total Intake(mL/kg) 150 (2 2) 200 (2 9)     Net +150 +200            Unmeasured Urine Occurrence 4 x          Weights:        Body mass index is 26 09 kg/m²  Weight (last 2 days)     Date/Time Weight    01/06/22 0900 68 9 (152)    01/05/22 1720 68 9 (152)        Physical Exam  Vitals reviewed  Constitutional:       General: She is not in acute distress  Appearance: She is not ill-appearing  HENT:      Head: Normocephalic and atraumatic  Mouth/Throat:      Mouth: Mucous membranes are moist    Eyes:      General: No scleral icterus  Right eye: No discharge  Left eye: No discharge  Cardiovascular:      Rate and Rhythm: Normal rate and regular rhythm  Pulses: Normal pulses  Heart sounds: Normal heart sounds  Pulmonary:      Effort: Pulmonary effort is normal       Comments: Diminished breath sounds b/l   Abdominal:      General: Bowel sounds are normal       Palpations: Abdomen is soft  Tenderness: There is no abdominal tenderness  Musculoskeletal:      Right wrist: Swelling and tenderness present  Right knee: Effusion present  No deformity, erythema or ecchymosis  Decreased range of motion  Tenderness present  Right lower leg: No edema  Left lower leg: No edema  Skin:     General: Skin is warm and dry  Capillary Refill: Capillary refill takes less than 2 seconds  Neurological:      Mental Status: She is alert and oriented to person, place, and time  LABORATORY DATA     Labs: I have personally reviewed pertinent reports    Results from last 7 days   Lab Units 01/07/22  0646 01/06/22  0540 01/05/22 2001   WBC Thousand/uL 5 57 5 53 6 87   HEMOGLOBIN g/dL 13 1 12 2 13 5   HEMATOCRIT % 38 5 36 7 39 8   PLATELETS Thousands/uL 205 210 220   NEUTROS PCT % 59 63 64   MONOS PCT % 11 10 10      Results from last 7 days   Lab Units 01/07/22  0646 01/06/22  1428 01/06/22  0540 01/05/22 2001 01/05/22 2001   POTASSIUM mmol/L 3 7 3  4* 2 8*   < > 3 2*   CHLORIDE mmol/L 109*  --  106  --  107   CO2 mmol/L 27  --  28  --  24   BUN mg/dL 23  --  16  --  12   CREATININE mg/dL 1 09  --  1 00  --  0 93   CALCIUM mg/dL 9 3  --  8 8  --  9 8   ALK PHOS U/L  --   --   --   --  92   ALT U/L  --   --   --   --  31   AST U/L  --   --   --   --  26    < > = values in this interval not displayed  Results from last 7 days   Lab Units 01/07/22  0646 01/06/22  0540 01/06/22  0027   MAGNESIUM mg/dL 2 5 2 5 2 1     Results from last 7 days   Lab Units 01/06/22  0540   PHOSPHORUS mg/dL 3 3                    IMAGING & DIAGNOSTIC TESTING     Radiology Results: I have personally reviewed pertinent reports  XR chest 1 view portable    Result Date: 1/6/2022  Impression: Cardiomegaly  CHF  Workstation performed: [id number]     XR wrist 3+ vw right    Result Date: 1/6/2022  Impression: No acute osseous abnormality  Workstation performed: [id number]     XR hand 3+ vw left    Result Date: 1/7/2022  Impression: No acute osseous abnormality  Workstation performed: [id number]     XR hand 3+ vw right    Result Date: 1/7/2022  Impression: No acute osseous abnormality  Workstation performed: [id number]     XR knee 3 vw right non injury    Result Date: 1/6/2022  Impression: No acute osseous abnormality  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  8 mm intra-articular loose body suggested  Workstation performed: [id number]     XR ankle 3+ vw left    Result Date: 1/6/2022  Impression: No acute osseous abnormality  Degenerative changes as described  Workstation performed: [id number]     Other Diagnostic Testing: I have personally reviewed pertinent reports      ACTIVE MEDICATIONS     Current Facility-Administered Medications   Medication Dose Route Frequency    acetaminophen (TYLENOL) tablet 650 mg  650 mg Oral Q6H PRN    albuterol (PROVENTIL HFA,VENTOLIN HFA) inhaler 2 puff  2 puff Inhalation Q4H PRN    atorvastatin (LIPITOR) tablet 40 mg  40 mg Oral After LIFE INTERACTION Diclofenac Sodium (VOLTAREN) 1 % topical gel 2 g  2 g Topical 4x Daily    dicyclomine (BENTYL) tablet 20 mg  20 mg Oral 4x Daily (AC & HS)    enoxaparin (LOVENOX) subcutaneous injection 40 mg  40 mg Subcutaneous Daily    furosemide (LASIX) injection 20 mg  20 mg Intravenous BID (diuretic)    Labetalol HCl (NORMODYNE) injection 5 mg  5 mg Intravenous Q6H PRN    lidocaine (LIDODERM) 5 % patch 1 patch  1 patch Topical Daily    losartan (COZAAR) tablet 50 mg  50 mg Oral Daily    nicotine (NICODERM CQ) 7 mg/24hr TD 24 hr patch 1 patch  1 patch Transdermal Daily    ondansetron (ZOFRAN) injection 4 mg  4 mg Intravenous Q6H PRN    potassium chloride (K-DUR,KLOR-CON) CR tablet 20 mEq  20 mEq Oral BID       VTE Pharmacologic Prophylaxis: Enoxaparin (Lovenox)  VTE Mechanical Prophylaxis: sequential compression device    Portions of the record may have been created with voice recognition software  Occasional wrong word or "sound a like" substitutions may have occurred due to the inherent limitations of voice recognition software    Read the chart carefully and recognize, using context, where substitutions have occurred   ==  Cortez Webb MD  [address]  Internal Medicine Residency PGY-3

## 2022-01-07 NOTE — PROGRESS NOTES
INTERNAL MEDICINE RESIDENCY PROGRESS NOTE     Name: Vero Herndon   Age & Sex: 63 y o  female   MRN: [id number]  Unit/Bed#: ED 09   Encounter: [id number]  Team: SOD Team B     PATIENT INFORMATION     Name: Vero Herndon   Age & Sex: 63 y o  female   MRN: [id number]  Hospital Stay Days: 1    ASSESSMENT/PLAN     Principal Problem:    SINGH (dyspnea on exertion)  Active Problems:    Abdominal pain    Hyperlipidemia    Pulmonary nodule    Elevated troponin    Smoker    Hypertension    HFrEF (heart failure with reduced ejection fraction) ([facility])    Acute on chronic combined systolic and diastolic congestive heart failure ([facility] [address] )    COVID-19 virus infection    Arthralgia of multiple joints    Polyarticular arthritis    Polyarticular arthritis  Assessment & Plan  · Patient presenting with 3 weeks history of asymmetric polyarticular joint pain associated with right knee, left ankle, right wrist, right 1st MCP, left 1st MCP  ·  Patient also reported mild swelling in these areas along with joint pain  · Patient states that the joint pain tends to resolve at one site and then move to another joint  · Patient was noted to have a small effusion on the right knee  · Ortho on board : patient refused knee aspiration   · Uric acid : normal, lyme panel : neg, anti CCP - pending, ESR - pending, parvovirus Ab - pending   · X ray of the right wrist : degenerative changes of the 1st MCP, X ray rt knee : 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  8 mm intra-articular loose body suggested  · S/p lidocaine patch for right knee   · Pt is Hepatitis C ab +ve      PLAN :   · Follow pending labs   · PRN tylenol for OA   · Continue voltaren gel         COVID-19 virus infection  Assessment & Plan  Patient with no history of COVID vaccination, testing positive for COVID on 01/06/2022    - patient currently stable on room air, asymptomatic  - patient denies any fevers or chills  - placed on contact and airborne isolation     Acute on chronic combined systolic and diastolic congestive heart failure (HCC)  Assessment & Plan  Wt Readings from Last 3 Encounters:   01/06/22 68 9 kg (152 lb)   05/07/21 69 kg (152 lb 3 2 oz)   05/06/21 68 8 kg (151 lb 12 oz)      · Patient with HFrEF 32% presented with 3 week history of shortness of breath, dyspnea on exertion, orthopnea and long-term medication noncompliance at home  ·  Likely concerning for acute on chronic CHF  · Patient also had a prior admission in 2021 with similar symptoms  ProBNP at 6804 at this admission  · New EF of 25 %  · HF team on board   · 24 hr I/O : likely inaccurate      - Plan as above  - Follow management per HF team   - monitor strict I's & O's  - daily standing weights  - trend BMP and sCr  - will correct any electrolyte derangements--keep K >4 , Mag > 2   - HF team on board   - placed on cardiac diet, low-sodium  - patient may benefit from outpatient cardiology follow-up after discharge  -Lasix 20 mg b i d  IV        HFrEF (heart failure with reduced ejection fraction) ([facility] [address] )  Assessment & Plan  Patient that heart failure with reduced EF, 32% with grade 2 diastolic dysfunction, severe diffuse hypokinesis, changes consistent with eccentric hypertrophy as of 05/2021       - patient is noncompliant with heart failure medications at baseline  - does not follow with a cardiologist in the outpatient setting  - HF team on board   - ECHO ( 1/6/22) shows EF - 25%, severely reduced systolic function, severe global hypokinesis and severely abnormal diastolic function      PLAN :   · Follow management per HF team   · Patient will need to comply with GDMT --> to qualify for ICD if EF remains < / = 35 %   · Will initiate telemetry         Hypertension  Assessment & Plan  History of hypertension   - was prescribed losartan 50 mg daily, furosemide 20 mg daily, metoprolol succinate 25 mg daily    Patient is not compliant with medications at home  -Currently on IV furosemide 20 mg b i d , losartan 50 mg daily  - metoprolol on hold in context of acute CHF exacerbation and the fact that patient was not taking meds at home   - BP still uncontrolled : 140-150s SBP / 90-100s DBP   - Continue to monitor, Resume BB as per cardiology     Smoker  Assessment & Plan  H/o smoking  Currently smokes 4 cigarettes per day  - Patient provided with nicotine patch 7 mg while inpatient     Elevated troponin  Assessment & Plan  Likely related to myocardial injury secondary to ongoing CHF exacerbation    - Patient currently denies any chest pain or tightness              Pulmonary nodule  Assessment & Plan  · H/o 5 mm right middle lobe nodule     · Based on current Fleischner Society 2017 Guidelines on incidental pulmonary nodule, no routine follow-up is needed if the patient is considered low risk for lung cancer  ·  If the patient is considered high risk for lung cancer, 12 month follow-up non-contrast chest CT is recommended      Hyperlipidemia  Assessment & Plan  On atorvastatin 40 mg qd     Abdominal pain  Assessment & Plan  · Patient presented with intermittent generalized abdominal pain along with mild abdominal swelling  ·  Patient was also noted to have similar abdominal symptoms at last admission in 04/2021  · At that time, patient was noted to have some atherosclerosis of abdominal vessels (CT abdomen pelvis from that admission)  Her abdominal pain may be correlated to this   She may also benefit from additional diuresis to decrease possible third spacing of fluids in abdomen as a result of CHF   - Patient had mesenteric duplex and RUQ  U/S at previous admission which did not reveal any significant findings  - Was noted to be responsive to bentyl at last admission, ordered bentyl  - Diuresis- 20 mg IV furosemide bid  - Will continue to monitor     * SINGH (dyspnea on exertion)  Assessment & Plan  · Patient with h/o HFrEF with last known EF at 32% from 04/2021 and grade 2 diastolic dysfunction, severe diffuse hypokinesis, changes consistent with eccentric hypertrophy  · Patient also had an LHC which revealed non-ischemic cardiomyopathy  · In context of patients recent h/o SOB, orthopnea, abdominal distension, this may be related to CHF exacerbation  · Patient endorsed that she is non-compliant with her medications  Patients Andrews maybe related to pulmonary vascular congestion and non-compliance with medications       - Patient was given one time dose of IV lasix 20 mg in ED  - Echo shows new EF of 25%   - HF on board -currently diuresing at 20 mg IV BID   I/ o: likely inaccurate   - Likely moved from NYHA class II to class III due to ongoing CHF exacerbation  -  Currently stable on room air  - Patient has covid infection but appears to be asymptomatic at this time  -cardiology and spironolactone 25 mg daily and will likely add low-dose beta-blocker today        Disposition:  Discharge likely tomorrow pending cards approval    SUBJECTIVE     Patient seen and examined  No acute events overnight  Patient states she has no pain, fevers, chills, palpitations, or shortness of breath  OBJECTIVE     Vitals:    01/06/22 2154 01/07/22 0637 01/07/22 0842 01/07/22 0845   BP: 151/94 149/92 146/100 146/100   BP Location: Right arm Right arm  Right arm   Pulse: 78 79  76   Resp: (!) 23 18  20   Temp:       TempSrc:       SpO2: 99% 98%  96%   Weight:       Height:          Temperature:   No data recorded  Temperature: 98 6 °F (37 °C)  Intake & Output:  I/O       01/05 0701 01/06 0700 01/06 0701 01/07 0700 01/07 0701 01/08 0700    IV Piggyback 150 200     Total Intake(mL/kg) 150 (2 2) 200 (2 9)     Net +150 +200            Unmeasured Urine Occurrence 4 x          Weights:        Body mass index is 26 09 kg/m²  Weight (last 2 days)     Date/Time Weight    01/06/22 0900 68 9 (152)    01/05/22 1720 68 9 (152)        Physical Exam  Constitutional:       Appearance: Normal appearance     HENT: Mouth/Throat:      Mouth: Mucous membranes are dry  Cardiovascular:      Rate and Rhythm: Normal rate and regular rhythm  Heart sounds: No murmur heard  No gallop  Pulmonary:      Breath sounds: Rales present  No wheezing  Abdominal:      General: Bowel sounds are normal       Palpations: Abdomen is soft  Tenderness: There is no abdominal tenderness  There is no guarding  Musculoskeletal:      Right lower leg: No edema  Left lower leg: No edema  Neurological:      Mental Status: She is alert  LABORATORY DATA     Labs: I have personally reviewed pertinent reports  Results from last 7 days   Lab Units 01/07/22  0646 01/06/22  0540 01/05/22 2001   WBC Thousand/uL 5 57 5 53 6 87   HEMOGLOBIN g/dL 13 1 12 2 13 5   HEMATOCRIT % 38 5 36 7 39 8   PLATELETS Thousands/uL 205 210 220   NEUTROS PCT % 59 63 64   MONOS PCT % 11 10 10      Results from last 7 days   Lab Units 01/07/22  0646 01/06/22  1428 01/06/22  0540 01/05/22 2001 01/05/22 2001   POTASSIUM mmol/L 3 7 3 4* 2 8*   < > 3 2*   CHLORIDE mmol/L 109*  --  106  --  107   CO2 mmol/L 27  --  28  --  24   BUN mg/dL 23  --  16  --  12   CREATININE mg/dL 1 09  --  1 00  --  0 93   CALCIUM mg/dL 9 3  --  8 8  --  9 8   ALK PHOS U/L  --   --   --   --  92   ALT U/L  --   --   --   --  31   AST U/L  --   --   --   --  26    < > = values in this interval not displayed  Results from last 7 days   Lab Units 01/07/22  0646 01/06/22  0540 01/06/22  0027   MAGNESIUM mg/dL 2 5 2 5 2 1     Results from last 7 days   Lab Units 01/06/22  0540   PHOSPHORUS mg/dL 3 3                    IMAGING & DIAGNOSTIC TESTING     Radiology Results: I have personally reviewed pertinent reports  XR chest 1 view portable    Result Date: 1/6/2022  Impression: Cardiomegaly  CHF  Workstation performed: [id number]     XR wrist 3+ vw right    Result Date: 1/6/2022  Impression: No acute osseous abnormality   Workstation performed: [id number]     XR hand 3+ vw left    Result Date: 1/7/2022  Impression: No acute osseous abnormality  Workstation performed: [id number]     XR hand 3+ vw right    Result Date: 1/7/2022  Impression: No acute osseous abnormality  Workstation performed: [id number]     XR knee 3 vw right non injury    Result Date: 1/6/2022  Impression: No acute osseous abnormality  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  8 mm intra-articular loose body suggested  Workstation performed: [id number]     XR ankle 3+ vw left    Result Date: 1/6/2022  Impression: No acute osseous abnormality  Degenerative changes as described  Workstation performed: [id number]     Other Diagnostic Testing: I have personally reviewed pertinent reports      ACTIVE MEDICATIONS     Current Facility-Administered Medications   Medication Dose Route Frequency    acetaminophen (TYLENOL) tablet 650 mg  650 mg Oral Q6H    albuterol (PROVENTIL HFA,VENTOLIN HFA) inhaler 2 puff  2 puff Inhalation Q4H PRN    atorvastatin (LIPITOR) tablet 40 mg  40 mg Oral After Dinner    Diclofenac Sodium (VOLTAREN) 1 % topical gel 2 g  2 g Topical 4x Daily    dicyclomine (BENTYL) tablet 20 mg  20 mg Oral 4x Daily (AC & HS)    enoxaparin (LOVENOX) subcutaneous injection 40 mg  40 mg Subcutaneous Daily    furosemide (LASIX) injection 20 mg  20 mg Intravenous BID (diuretic)    Labetalol HCl (NORMODYNE) injection 5 mg  5 mg Intravenous Q6H PRN    lidocaine (LIDODERM) 5 % patch 1 patch  1 patch Topical Daily    losartan (COZAAR) tablet 50 mg  50 mg Oral Daily    nicotine (NICODERM CQ) 7 mg/24hr TD 24 hr patch 1 patch  1 patch Transdermal Daily    ondansetron (ZOFRAN) injection 4 mg  4 mg Intravenous Q6H PRN    potassium chloride (K-DUR,KLOR-CON) CR tablet 20 mEq  20 mEq Oral BID    [START ON 1/8/2022] spironolactone (ALDACTONE) tablet 25 mg  25 mg Oral Daily       VTE Pharmacologic Prophylaxis: Sequential compression device (Venodyne)   VTE Mechanical Prophylaxis: sequential compression device    Portions of the record may have been created with voice recognition software  Occasional wrong word or "sound a like" substitutions may have occurred due to the inherent limitations of voice recognition software    Read the chart carefully and recognize, using context, where substitutions have occurred   ==  Sav Wright, [address] [facility]  Internal Medicine Residency PGY-2

## 2022-01-07 NOTE — ASSESSMENT & PLAN NOTE
Wt Readings from Last 3 Encounters:   01/06/22 68 9 kg (152 lb)   05/07/21 69 kg (152 lb 3 2 oz)   05/06/21 68 8 kg (151 lb 12 oz)     · Patient with HFrEF 32% presented with 3 week history of shortness of breath, dyspnea on exertion, orthopnea and long-term medication noncompliance at home  ·  Likely concerning for acute on chronic CHF  · Patient also had a prior admission in 2021 with similar symptoms  ProBNP at 6804 at this admission    · New EF of 25 %  · HF team on board   · 24 hr I/O : likely inaccurate     - Plan as above  - Follow management per HF team   - monitor strict I's & O's  - daily standing weights  - trend BMP and sCr  - will correct any electrolyte derangements--keep K >4 , Mag > 2   - HF team on board   - placed on cardiac diet, low-sodium  - patient may benefit from outpatient cardiology follow-up after discharge

## 2022-01-07 NOTE — UTILIZATION REVIEW
Inpatient Admission Authorization Request   NOTIFICATION OF INPATIENT ADMISSION/INPATIENT AUTHORIZATION REQUEST   SERVICING FACILITY:   [facility]  Address: [address] [place] [address], [address] [place] [address]  Tax ID: [id number]  NPI: [id number]  Place of Service: Inpatient [address] [facility]  [address]  Place of Service Code: 24     ATTENDING PROVIDER:  Attending Name and NPI#: Jennine Hatchet, Md [[id number]]  Address: [address] [place] [address], [address] [place] [address]  Phone: [phone]     UTILIZATION REVIEW CONTACT:  David Mcfarland Utilization   Network Utilization Review Department  Phone: [phone]  Fax: [phone]  Email: [email or web address]     PHYSICIAN ADVISORY SERVICES:  FOR XNYC-BP-RCWQ REVIEW - MEDICAL NECESSITY DENIAL  Phone: [phone]  Fax: [phone]  Email: [email or web address]     TYPE OF REQUEST:  Inpatient Status     ADMISSION INFORMATION:  ADMISSION DATE/TIME: 1/5/22 10:59 PM  PATIENT DIAGNOSIS CODE/DESCRIPTION:  Joint swelling [M25 40]  DISCHARGE DATE/TIME: No discharge date for patient encounter  DISCHARGE DISPOSITION (IF DISCHARGED): Home/Self Care     IMPORTANT INFORMATION:  Please contact the David Mcfarland directly with any questions or concerns regarding this request  Department voicemails are confidential     Send requests for admission clinical reviews, concurrent reviews, approvals, and administrative denials due to lack of clinical to fax [phone]

## 2022-01-07 NOTE — PROGRESS NOTES
Heart Failure/ Pulmonary Hypertension Progress Note - Jazmine Gibbons 63 y o  female MRN: [id number]    Unit/Bed#: ED 09 Encounter: [id number]      Assessment:    Principal Problem:    SINGH (dyspnea on exertion)  Active Problems:    Abdominal pain    Hyperlipidemia    Pulmonary nodule    Elevated troponin    Smoker    Hypertension    HFrEF (heart failure with reduced ejection fraction) (HCC)    Acute on chronic combined systolic and diastolic congestive heart failure (HCC)    COVID-19 virus infection    Arthralgia of multiple joints    Polyarticular arthritis    Assessment/Plan:     Acute on chronic heart failure with reduced EF  Etiology: likely uncontrolled hypertension  No obstructive CAD on cardiac cath  Denies alcohol or drug abuse  No family history  TSH normal  Current decompensation likely related to medication non-adherence and COVID infection     Studies personally reviewed by myself:     Echo 1/6/2022:  LVEF: 25%  LVIDd:  RV: mildly dilated, mildly reduced  MR: mild  PASP: 54 mmHg  RVOT: no notching, shortened PAAT  Other: restrictive diastology     Cardiac MRI: LVEF 34%  LVEDD 7cm  CO 5 4L/min  RV is normal in size and systolic function  No region of hyperenhancement on delayed post gadolinium      LHC 5/5/21: LM - no obstructive disease  LAD with mild diffuse atherosclerosis with a 40 to 50% obstruction at the level of the 1st diagonal branch  RCA with 50% stenosis at level of Om1 and diffuse irregular 60% obstruction throughout proximal protion   Om1 with 60% stenosis at origin       EKG: sinus rhythm, LVH with repolarization abnormality     Echocardiogram 5/1/2021  LVEF: 32%, 25% on review  LVIDd: 6cm, mild to moderately increased wall thickness  RV: normal size and systolic function  MR: mild  PASP: normal  RVOT: no notching  Other: grade 2 diastology     Neurohormonal Blockade: - only took medications for about 2 months since discharge in 5/2021  --Beta-Blocker: metoprolol succinate 25mg BID  --ACEi, ARB or ARNi: losartan 25mg daily    (or SVR reduction)  --Aldosterone Receptor Blocker:  --Diuretic: furosemide 20mg po as needed     Sudden Cardiac Death Risk Reduction:  --ICD: refused LifeVest on 5/2021 admission     Electrical Resynchronization:  --narrow QRS     Advanced Therapies (If appropriate):  --Will continue to monitor  Patient is homeless     Uncontrolled hypertension  Has been off medications as above  Tobacco use  Positive for hepatitis C antibody  Chronic pain syndrome  COVID 19 virus infection     Plan:  Diuresis with lasix 20mg IV BID  Continue losartan 50mg daily  Start spironolactone 25mg daily  Likely addition of low dose betablocker tomorrow  Strict I/O and daily weights  Optimization of GDMT - will need to comply with this to qualify for ICD if EF remains <35%  COVID management per primary    Subjective:   Patient seen and examined  No significant events overnight  Shortness of breath better  Abdominal fullness better    Review of Systems   Constitutional: Positive for fatigue  Respiratory: Negative for chest tightness  Cardiovascular: Negative for chest pain, palpitations and leg swelling  Gastrointestinal: Negative for abdominal pain, nausea and vomiting  Neurological: Negative for dizziness and light-headedness  Objective: Intake/ Output: ?? Weight: 152 lbs 1/6/2022    Vitals: Blood pressure 146/100, pulse 76, temperature 98 6 °F (37 °C), temperature source Oral, resp  rate 20, height 5' 4" (1 626 m), weight 68 9 kg (152 lb), SpO2 96 %  , Body mass index is 26 09 kg/m² , I/O last 3 completed shifts: In: 350 [IV Piggyback:350]  Out: -   No intake/output data recorded  Wt Readings from Last 3 Encounters:   01/06/22 68 9 kg (152 lb)   05/07/21 69 kg (152 lb 3 2 oz)   05/06/21 68 8 kg (151 lb 12 oz)     No intake or output data in the 24 hours ending 01/07/22 1547  I/O last 3 completed shifts:   In: 350 [IV Piggyback:350]  Out: -         Physical Exam:  Vitals:    01/06/22 2154 01/07/22 0637 01/07/22 0842 01/07/22 0845   BP: 151/94 149/92 146/100 146/100   BP Location: Right arm Right arm  Right arm   Pulse: 78 79  76   Resp: (!) 23 18  20   Temp:       TempSrc:       SpO2: 99% 98%  96%   Weight:       Height:           GEN: Nuha Sin appears tired, pleasant and cooperative   HEENT: NC/AT, moist mucosa, anicteric sclerae; extraocular muscles intact  NECK: supple, no carotid bruits   HEART: regular rhythm, normal S1 and S2, no murmurs, clicks, gallops or rubs, JVP is elevated  LUNGS: clear to auscultation bilaterally; no wheezes, rales, or rhonchi   ABDOMEN: normal bowel sounds, soft, no tenderness, no distention  EXTREMITIES: peripheral pulses normal; no clubbing, cyanosis, or edema  NEURO: no focal findings   SKIN: normal without suspicious lesions on exposed skin      Current Facility-Administered Medications:     acetaminophen (TYLENOL) tablet 650 mg, 650 mg, Oral, Q6H, Kraig Silva MD    albuterol (PROVENTIL HFA,VENTOLIN HFA) inhaler 2 puff, 2 puff, Inhalation, Q4H PRN, Vimal Brooks MD, 2 puff at 01/06/22 1734    atorvastatin (LIPITOR) tablet 40 mg, 40 mg, Oral, After Anju Shock, MD, 40 mg at 01/06/22 1724    Diclofenac Sodium (VOLTAREN) 1 % topical gel 2 g, 2 g, Topical, 4x Daily, Vimal Brooks MD, 2 g at 01/07/22 1123    dicyclomine (BENTYL) tablet 20 mg, 20 mg, Oral, 4x Daily (AC & HS), Author MD Seb, 20 mg at 01/07/22 1123    enoxaparin (LOVENOX) subcutaneous injection 40 mg, 40 mg, Subcutaneous, Daily, Vimal Brooks MD, 40 mg at 01/07/22 0844    furosemide (LASIX) injection 20 mg, 20 mg, Intravenous, BID (diuretic), Ramirez Bray PA-C, 20 mg at 01/07/22 9741    Labetalol HCl (NORMODYNE) injection 5 mg, 5 mg, Intravenous, Q6H PRN, Author MD eSb    lidocaine (LIDODERM) 5 % patch 1 patch, 1 patch, Topical, Daily, Vimal Brooks MD, 1 patch at 01/06/22 0134    losartan (COZAAR) tablet 50 mg, 50 mg, Oral, Daily, Adele Oppenheim, MD, 50 mg at 01/07/22 0842    nicotine (NICODERM CQ) 7 mg/24hr TD 24 hr patch 1 patch, 1 patch, Transdermal, Daily, Adele Oppenheim, MD, 1 patch at 01/07/22 0841    ondansetron (ZOFRAN) injection 4 mg, 4 mg, Intravenous, Q6H PRN, Adele Oppenheim, MD    potassium chloride (K-DUR,KLOR-CON) CR tablet 20 mEq, 20 mEq, Oral, BID, Sheldon Campos PA-C, 20 mEq at 01/07/22 1135    spironolactone (ALDACTONE) tablet 25 mg, 25 mg, Oral, Daily, Rosy Johnson MD    Current Outpatient Medications:     albuterol (PROVENTIL HFA,VENTOLIN HFA) 90 mcg/act inhaler, Inhale 2 puffs every 4 (four) hours as needed for wheezing, Disp: 8 g, Rfl: 1    atorvastatin (LIPITOR) 40 mg tablet, TAKE 1 TABLET (40 MG TOTAL) BY MOUTH DAILY AFTER DINNER, Disp: 30 tablet, Rfl: 0    calcium carbonate (TUMS) 500 mg chewable tablet, Chew 2 tablets (1,000 mg total) daily as needed for indigestion or heartburn, Disp: 30 tablet, Rfl: 0    dicyclomine (BENTYL) 20 mg tablet, TAKE 1 TABLET (20 MG TOTAL) BY MOUTH 3 (THREE) TIMES A DAY BEFORE MEALS, Disp: 90 tablet, Rfl: 0    furosemide (LASIX) 20 mg tablet, TAKE 1 TABLET AS NEEDED PLEASE TAKE 1 TABLET BY MOUTH AS NEEDED FOR LOWER EXTREMITY SWELLING , Disp: 30 tablet, Rfl: 0    losartan (COZAAR) 50 mg tablet, TAKE 1 TABLET BY MOUTH EVERY DAY, Disp: 30 tablet, Rfl: 0    melatonin 3 mg, Take 1 tablet (3 mg total) by mouth daily at bedtime, Disp: 30 tablet, Rfl: 0    metoprolol succinate (TOPROL-XL) 25 mg 24 hr tablet, TAKE 1 TABLET BY MOUTH TWICE A DAY, Disp: 60 tablet, Rfl: 0    nicotine (NICODERM CQ) 7 mg/24hr TD 24 hr patch, Place 1 patch on the skin daily, Disp: 28 patch, Rfl: 0    triamcinolone (KENALOG) 0 1 % cream, Apply topically 2 (two) times a day, Disp: 30 g, Rfl: 1      Labs & Results:    Results from last 7 days   Lab Units 01/06/22  0027   CK TOTAL U/L 40     Results from last 7 days   Lab Units 01/07/22  0646 01/06/22  0540 01/05/22 2001   WBC Thousand/uL 5 57 5  53 6 87   HEMOGLOBIN g/dL 13 1 12 2 13 5   HEMATOCRIT % 38 5 36 7 39 8   PLATELETS Thousands/uL 205 210 220         Results from last 7 days   Lab Units 01/07/22  0646 01/06/22  1428 01/06/22  0540 01/05/22 2001 01/05/22 2001   POTASSIUM mmol/L 3 7 3 4* 2 8*   < > 3 2*   CHLORIDE mmol/L 109*  --  106  --  107   CO2 mmol/L 27  --  28  --  24   BUN mg/dL 23  --  16  --  12   CREATININE mg/dL 1 09  --  1 00  --  0 93   CALCIUM mg/dL 9 3  --  8 8  --  9 8   ALK PHOS U/L  --   --   --   --  92   ALT U/L  --   --   --   --  31   AST U/L  --   --   --   --  26    < > = values in this interval not displayed             Juan R Sotelo MD  Advanced Heart Failure and Mechanical Circulatory Ul  [facility]

## 2022-01-07 NOTE — ASSESSMENT & PLAN NOTE
History of hypertension   - was prescribed losartan 50 mg daily, furosemide 20 mg daily, metoprolol succinate 25 mg daily  Patient is not compliant with medications at home  -Currently on IV furosemide 20 mg b i d , losartan 50 mg daily  - metoprolol on hold in context of acute CHF exacerbation and the fact that patient was not taking meds at home   - BP still uncontrolled : 140-150s SBP / 90-100s DBP   - Continue to monitor, Resume BB when able

## 2022-01-07 NOTE — ASSESSMENT & PLAN NOTE
· H/o 5 mm right middle lobe nodule  · Based on current Fleischner Society 2017 Guidelines on incidental pulmonary nodule, no routine follow-up is needed if the patient is considered low risk for lung cancer  ·  If the patient is considered high risk for lung cancer, 12 month follow-up non-contrast chest CT is recommended

## 2022-01-07 NOTE — PROGRESS NOTES
Progress Note - Orthopedics   Aydee Singh 63 y o  female MRN: [id number]  Unit/Bed#: X ray      Subjective:    63 y  o female  No acute events, no complaints  Pt doing well  Pain still in right knee and right wrist, unchanged from yesterday  No new areas of swelling   Denies fevers chills, CP, SOB    Labs:  0   Lab Value Date/Time    HCT 36 7 01/06/2022 0540    HCT 39 8 01/05/2022 2001    HCT 35 7 05/07/2021 0645    HGB 12 2 01/06/2022 0540    HGB 13 5 01/05/2022 2001    HGB 11 8 05/07/2021 0645    INR 1 00 04/20/2021 1702    WBC 5 53 01/06/2022 0540    WBC 6 87 01/05/2022 2001    WBC 7 10 05/07/2021 0645    CRP 15 0 (H) 01/06/2022 0027       Meds:    Current Facility-Administered Medications:     acetaminophen (TYLENOL) tablet 650 mg, 650 mg, Oral, Q6H PRN, Azalia Gaona MD, 650 mg at 01/06/22 0935    albuterol (PROVENTIL HFA,VENTOLIN HFA) inhaler 2 puff, 2 puff, Inhalation, Q4H PRN, Azalia Gaona MD, 2 puff at 01/06/22 1734    atorvastatin (LIPITOR) tablet 40 mg, 40 mg, Oral, After Jose Eduardo Webb MD, 40 mg at 01/06/22 1724    Diclofenac Sodium (VOLTAREN) 1 % topical gel 2 g, 2 g, Topical, 4x Daily, Azalia Gaona MD, 2 g at 01/06/22 2123    dicyclomine (BENTYL) tablet 20 mg, 20 mg, Oral, 4x Daily (AC & HS), Gloria Alvares MD, 20 mg at 01/06/22 2122    enoxaparin (LOVENOX) subcutaneous injection 40 mg, 40 mg, Subcutaneous, Daily, Azalia Gaona MD, 40 mg at 01/06/22 0808    furosemide (LASIX) injection 20 mg, 20 mg, Intravenous, BID (diuretic), Xiomara Rogel PA-C, 20 mg at 01/06/22 1639    Labetalol HCl (NORMODYNE) injection 5 mg, 5 mg, Intravenous, Q6H PRN, Gloria Alvares MD    lidocaine (LIDODERM) 5 % patch 1 patch, 1 patch, Topical, Daily, Azalia Gaona MD, 1 patch at 01/06/22 0134    losartan (COZAAR) tablet 50 mg, 50 mg, Oral, Daily, Azalia Gaona MD, 50 mg at 01/06/22 0807    nicotine (NICODERM CQ) 7 mg/24hr TD 24 hr patch 1 patch, 1 patch, Transdermal, Daily, Azalia Gaona MD, 1 patch at 01/06/22 0825    ondansetron (ZOFRAN) injection 4 mg, 4 mg, Intravenous, Q6H PRN, Savannah Lee MD    potassium chloride (K-DUR,KLOR-CON) CR tablet 20 mEq, 20 mEq, Oral, BID, Grey Erwin PA-C, 20 mEq at 01/06/22 1725    Current Outpatient Medications:     albuterol (PROVENTIL HFA,VENTOLIN HFA) 90 mcg/act inhaler, Inhale 2 puffs every 4 (four) hours as needed for wheezing, Disp: 8 g, Rfl: 1    atorvastatin (LIPITOR) 40 mg tablet, TAKE 1 TABLET (40 MG TOTAL) BY MOUTH DAILY AFTER DINNER, Disp: 30 tablet, Rfl: 0    calcium carbonate (TUMS) 500 mg chewable tablet, Chew 2 tablets (1,000 mg total) daily as needed for indigestion or heartburn, Disp: 30 tablet, Rfl: 0    dicyclomine (BENTYL) 20 mg tablet, TAKE 1 TABLET (20 MG TOTAL) BY MOUTH 3 (THREE) TIMES A DAY BEFORE MEALS, Disp: 90 tablet, Rfl: 0    furosemide (LASIX) 20 mg tablet, TAKE 1 TABLET AS NEEDED PLEASE TAKE 1 TABLET BY MOUTH AS NEEDED FOR LOWER EXTREMITY SWELLING , Disp: 30 tablet, Rfl: 0    losartan (COZAAR) 50 mg tablet, TAKE 1 TABLET BY MOUTH EVERY DAY, Disp: 30 tablet, Rfl: 0    melatonin 3 mg, Take 1 tablet (3 mg total) by mouth daily at bedtime, Disp: 30 tablet, Rfl: 0    metoprolol succinate (TOPROL-XL) 25 mg 24 hr tablet, TAKE 1 TABLET BY MOUTH TWICE A DAY, Disp: 60 tablet, Rfl: 0    nicotine (NICODERM CQ) 7 mg/24hr TD 24 hr patch, Place 1 patch on the skin daily, Disp: 28 patch, Rfl: 0    triamcinolone (KENALOG) 0 1 % cream, Apply topically 2 (two) times a day, Disp: 30 g, Rfl: 1    Blood Culture:   Lab Results   Component Value Date    BLOODCX No Growth After 5 Days  05/05/2021       Wound Culture:   No results found for: WOUNDCULT    Ins and Outs:  I/O last 24 hours:   In: 350 [IV Piggyback:350]  Out: -           Physical:  Vitals:    01/06/22 2154   BP: 151/94   Pulse: 78   Resp: (!) 23   Temp:    SpO2: 99%     Musculoskeletal: right Upper Extremity  · Skin warm dry still with mild soft tissue swelling right ulnar styloid region minimal pain with wrist ROM  · SILT m/r/u  Motor intact ain/pin/m/r/u, 2+ radial pulse fingers warm brisk cap refill  ·   MSK Right lower  · Skin warm dry there is no swelling to right knee  · TTP right knee medial joint line, mild pain with AROM of the right knee but has near full flexion and full extension without pain  · SILT s/s/sp/dp/t  +fhl/ehl, +ankle dorsi/plantar flexion  2+ DP pulse    Assessment:    63 y  o female polyarthritis likely related to primary osteoarthritis of multiple joints, also consider rheumatologic condition  No indications for acute orthopedic surgery intervention at this time       Plan:  · WBAT  · Anti-inflammatory medications PRN for osteoarthritis vs inflammatory arthritis  · Remainder of care by primary team  · PT/OT  · Pain control  · Follow up PRN as outpatient for continued symptoms, no need for follow up appointment at this time  · Dispo: Ortho signing off    Christian Lozano MD

## 2022-01-07 NOTE — ASSESSMENT & PLAN NOTE
· Patient with h/o HFrEF with last known EF at 32% from 04/2021 and grade 2 diastolic dysfunction, severe diffuse hypokinesis, changes consistent with eccentric hypertrophy  · Patient also had an LHC which revealed non-ischemic cardiomyopathy  · In context of patients recent h/o SOB, orthopnea, abdominal distension, this may be related to CHF exacerbation  · Patient endorsed that she is non-compliant with her medications  Patients Andrews maybe related to pulmonary vascular congestion and non-compliance with medications       - Patient was given one time dose of IV lasix 20 mg in ED  - Echo shows new EF of 25%   - HF on board -currently diuresing at 20 mg IV BID   I/ o: likely inaccurate   - Likely moved from NYHA class II to class III due to ongoing CHF exacerbation  -  Currently stable on room air  - Patient has covid infection but appears to be asymptomatic at this time

## 2022-01-07 NOTE — UTILIZATION REVIEW
Continued Stay Review    WAS OBSERVATION 01/05/2022 @ 2461 , CONVERTED TO INPATIENT ADMISSION 01/07/2022 @ 1433, DUE TO CONTINUED STAY REQUIRED TO CARE FOR PATIENT WITH  Bryan Ramos The patient is a 51-year-old female who presented to the hospital with several complaints and was diagnosed with acute on chronic heart failure  She reported joint swelling also  The patient presented to the hospital on January 5th, and will be hospitalized be on January 7th  For this reason I recommend change to an inpatient admission  She continues to receive intravenous diuretics  The orthopedics team is involved in her care  Date: 01/07/2022                        Inpatient Admission  Once        Transfer Service: [facility] Medicine       Question Answer Comment   Level of Care Med Surg    Estimated length of stay More than 2 Midnights    Certification I certify that inpatient services are medically necessary for this patient for a duration of greater than two midnights  See H&P and MD Progress Notes for additional information about the patient's course of treatment  01/07/22 1433     Current Patient Class: Inpatient  Current Level of Care: Med/Surg    HPI:61 y o  female initially admitted on 01/05/2022     Assessment/Plan: Polyarticular arthritis  + Covid 19:   no history of COVID vaccination, testing positive for COVID on 01/06/2022  Continue Contact & Airborne Isolation  Monitor & Trend respiratory status  Acute on Chronic combined systolic/diastolic CHF  New EF 25%  Continue IV diuresis  Daily standing weights  I&O strict  Monitor & Trend BMP & Cr   Telemetry  WBAT  Anti-inflammatory medication PRN  PT/OT  Analgesia Scheduled        Vital Signs:   Date/Time Temp Pulse Resp BP MAP (mmHg) SpO2 O2 Device Patient Position - Orthostatic VS   01/07/22 0845 -- 76 20 146/100 116 96 % None (Room air) Sitting   01/07/22 0842 -- -- -- 146/100 -- -- -- --   01/07/22 0637 -- 79 18 149/92 -- 98 % None (Room air) Lying   01/06/22 2154 -- 78 23 Abnormal  151/94 -- 99 % None (Room air) Sitting     Date and Time Eye Opening Best Verbal Response Best Motor Response Vishal Coma Scale Score   01/07/22 0848 4 5 6 15     01/07/2022  @ 0211  Left hand X:  No acute osseous abnormality  01/07/2022 @ 0212  Right hand X:  No acute osseous abnormality       Pertinent Labs/Diagnostic Results:   Results from last 7 days   Lab Units 01/05/22 2001   SARS-COV-2  Positive*     Results from last 7 days   Lab Units 01/07/22  0646 01/06/22  0540 01/05/22 2001   WBC Thousand/uL 5 57 5 53 6 87   HEMOGLOBIN g/dL 13 1 12 2 13 5   HEMATOCRIT % 38 5 36 7 39 8   PLATELETS Thousands/uL 205 210 220   NEUTROS ABS Thousands/µL 3 26 3 50 4 42     Results from last 7 days   Lab Units 01/07/22  0646 01/06/22  1428 01/06/22 0540 01/06/22 0027 01/05/22 2001   SODIUM mmol/L 142  --  141  --  140   POTASSIUM mmol/L 3 7 3 4* 2 8*  --  3 2*   CHLORIDE mmol/L 109*  --  106  --  107   CO2 mmol/L 27  --  28  --  24   ANION GAP mmol/L 6  --  7  --  9   BUN mg/dL 23  --  16  --  12   CREATININE mg/dL 1 09  --  1 00  --  0 93   EGFR ml/min/1 73sq m 55  --  61  --  67   CALCIUM mg/dL 9 3  --  8 8  --  9 8   MAGNESIUM mg/dL 2 5  --  2 5 2 1  --    PHOSPHORUS mg/dL  --   --  3 3  --   --      Results from last 7 days   Lab Units 01/05/22 2001   AST U/L 26   ALT U/L 31   ALK PHOS U/L 92   TOTAL PROTEIN g/dL 8 2   ALBUMIN g/dL 3 7   TOTAL BILIRUBIN mg/dL 1 35*     Results from last 7 days   Lab Units 01/07/22 0646 01/06/22  0540 01/05/22 2001   GLUCOSE RANDOM mg/dL 118 123 104     Results from last 7 days   Lab Units 01/06/22 0027   CK TOTAL U/L 40     Results from last 7 days   Lab Units 01/06/22 0027 01/05/22 2228 01/05/22 2001   HS TNI 0HR ng/L  --   --  128*   HS TNI 2HR ng/L  --  110*  --    HSTNI D2 ng/L  --  -18  --    HS TNI 4HR ng/L 106*  --   --    HSTNI D4 ng/L -22  --   --      Results from last 7 days   Lab Units 01/06/22  0027   TSH 3RD GENERATON uIU/mL 1 520 Results from last 7 days   Lab Units 01/06/22  0540 01/06/22  0027   PROCALCITONIN ng/ml <0 05 <0 05     Results from last 7 days   Lab Units 01/05/22 2001   NT-PRO BNP pg/mL 6,804*     Results from last 7 days   Lab Units 01/06/22  0540   HEP B S AG  Non-reactive   HEP C AB  High Reactive*   HEP B C IGM  Non-reactive   HEP B C TOTAL AB  Non-reactive     Results from last 7 days   Lab Units 01/07/22  0646 01/06/22  0027   CRP mg/L  --  15 0*   SED RATE mm/hour 46*  --      Results from last 7 days   Lab Units 01/05/22 2001   INFLUENZA A PCR  Negative   INFLUENZA B PCR  Negative   RSV PCR  Negative     Medications:   Scheduled Medications:  acetaminophen, 650 mg, Oral, Q6H  atorvastatin, 40 mg, Oral, After Dinner  Diclofenac Sodium, 2 g, Topical, 4x Daily  dicyclomine, 20 mg, Oral, 4x Daily (AC & HS)  enoxaparin, 40 mg, Subcutaneous, Daily  furosemide, 20 mg, Intravenous, BID (diuretic)  lidocaine, 1 patch, Topical, Daily  losartan, 50 mg, Oral, Daily  nicotine, 1 patch, Transdermal, Daily  potassium chloride, 20 mEq, Oral, BID      Continuous IV Infusions:     PRN Meds:  albuterol, 2 puff, Inhalation, Q4H PRN  Labetalol HCl, 5 mg, Intravenous, Q6H PRN  ondansetron, 4 mg, Intravenous, Q6H PRN        Discharge Plan: D - [facility]    Network Utilization Review Department  ATTENTION: Please call with any questions or concerns to [phone] and carefully listen to the prompts so that you are directed to the right person  All voicemails are confidential   Madhu Glez all requests for admission clinical reviews, approved or denied determinations and any other requests to dedicated fax number below belonging to the campus where the patient is receiving treatment   List of dedicated fax numbers for the Facilities:  [address] DENIALS (Administrative/Medical Necessity) [phone]   [address] (Maternity/NICU/Pediatrics) [phone]   [address] [phone] [address]  [phone]   [address] [address] [place] [address] [address] [place] [address] [phone]

## 2022-01-08 LAB
ANION GAP SERPL CALCULATED.3IONS-SCNC: 4 MMOL/L (ref 4–13)
BASOPHILS # BLD AUTO: 0.04 THOUSANDS/ΜL (ref 0–0.1)
BASOPHILS NFR BLD AUTO: 1 % (ref 0–1)
BUN SERPL-MCNC: 28 MG/DL (ref 5–25)
CALCIUM SERPL-MCNC: 9.1 MG/DL (ref 8.3–10.1)
CHLORIDE SERPL-SCNC: 108 MMOL/L (ref 100–108)
CO2 SERPL-SCNC: 28 MMOL/L (ref 21–32)
CREAT SERPL-MCNC: 1 MG/DL (ref 0.6–1.3)
EOSINOPHIL # BLD AUTO: 0.08 THOUSAND/ΜL (ref 0–0.61)
EOSINOPHIL NFR BLD AUTO: 2 % (ref 0–6)
ERYTHROCYTE [DISTWIDTH] IN BLOOD BY AUTOMATED COUNT: 13 % (ref 11.6–15.1)
GFR SERPL CREATININE-BSD FRML MDRD: 61 ML/MIN/1.73SQ M
GLUCOSE SERPL-MCNC: 101 MG/DL (ref 65–140)
HCT VFR BLD AUTO: 40.2 % (ref 34.8–46.1)
HGB BLD-MCNC: 13.1 G/DL (ref 11.5–15.4)
IMM GRANULOCYTES # BLD AUTO: 0.02 THOUSAND/UL (ref 0–0.2)
IMM GRANULOCYTES NFR BLD AUTO: 0 % (ref 0–2)
LYMPHOCYTES # BLD AUTO: 1.56 THOUSANDS/ΜL (ref 0.6–4.47)
LYMPHOCYTES NFR BLD AUTO: 31 % (ref 14–44)
MAGNESIUM SERPL-MCNC: 2.2 MG/DL (ref 1.6–2.6)
MCH RBC QN AUTO: 29.9 PG (ref 26.8–34.3)
MCHC RBC AUTO-ENTMCNC: 32.6 G/DL (ref 31.4–37.4)
MCV RBC AUTO: 92 FL (ref 82–98)
MONOCYTES # BLD AUTO: 0.69 THOUSAND/ΜL (ref 0.17–1.22)
MONOCYTES NFR BLD AUTO: 14 % (ref 4–12)
NEUTROPHILS # BLD AUTO: 2.68 THOUSANDS/ΜL (ref 1.85–7.62)
NEUTS SEG NFR BLD AUTO: 52 % (ref 43–75)
NRBC BLD AUTO-RTO: 0 /100 WBCS
PLATELET # BLD AUTO: 195 THOUSANDS/UL (ref 149–390)
PMV BLD AUTO: 13.2 FL (ref 8.9–12.7)
POTASSIUM SERPL-SCNC: 3.5 MMOL/L (ref 3.5–5.3)
RBC # BLD AUTO: 4.38 MILLION/UL (ref 3.81–5.12)
SODIUM SERPL-SCNC: 140 MMOL/L (ref 136–145)
WBC # BLD AUTO: 5.07 THOUSAND/UL (ref 4.31–10.16)

## 2022-01-08 PROCEDURE — 99232 SBSQ HOSP IP/OBS MODERATE 35: CPT | Performed by: INTERNAL MEDICINE

## 2022-01-08 PROCEDURE — 85025 COMPLETE CBC W/AUTO DIFF WBC: CPT | Performed by: INTERNAL MEDICINE

## 2022-01-08 PROCEDURE — 80048 BASIC METABOLIC PNL TOTAL CA: CPT | Performed by: INTERNAL MEDICINE

## 2022-01-08 PROCEDURE — 83735 ASSAY OF MAGNESIUM: CPT | Performed by: INTERNAL MEDICINE

## 2022-01-08 PROCEDURE — 99232 SBSQ HOSP IP/OBS MODERATE 35: CPT | Performed by: PHYSICIAN ASSISTANT

## 2022-01-08 RX ORDER — POTASSIUM CHLORIDE 20 MEQ/1
40 TABLET, EXTENDED RELEASE ORAL ONCE
Status: DISCONTINUED | OUTPATIENT
Start: 2022-01-08 | End: 2022-01-08

## 2022-01-08 RX ORDER — POTASSIUM CHLORIDE 20 MEQ/1
20 TABLET, EXTENDED RELEASE ORAL ONCE
Status: COMPLETED | OUTPATIENT
Start: 2022-01-08 | End: 2022-01-08

## 2022-01-08 RX ORDER — KETOROLAC TROMETHAMINE 30 MG/ML
15 INJECTION, SOLUTION INTRAMUSCULAR; INTRAVENOUS ONCE
Status: COMPLETED | OUTPATIENT
Start: 2022-01-08 | End: 2022-01-08

## 2022-01-08 RX ORDER — LANOLIN ALCOHOL/MO/W.PET/CERES
3 CREAM (GRAM) TOPICAL ONCE
Status: COMPLETED | OUTPATIENT
Start: 2022-01-08 | End: 2022-01-08

## 2022-01-08 RX ORDER — FUROSEMIDE 10 MG/ML
20 INJECTION INTRAMUSCULAR; INTRAVENOUS
Status: COMPLETED | OUTPATIENT
Start: 2022-01-08 | End: 2022-01-09

## 2022-01-08 RX ADMIN — ATORVASTATIN CALCIUM 40 MG: 40 TABLET, FILM COATED ORAL at 17:22

## 2022-01-08 RX ADMIN — SPIRONOLACTONE 25 MG: 25 TABLET ORAL at 09:06

## 2022-01-08 RX ADMIN — DICYCLOMINE HYDROCHLORIDE 20 MG: 20 TABLET ORAL at 21:17

## 2022-01-08 RX ADMIN — DICLOFENAC SODIUM 2 G: 10 GEL TOPICAL at 09:07

## 2022-01-08 RX ADMIN — LOSARTAN POTASSIUM 50 MG: 50 TABLET, FILM COATED ORAL at 09:07

## 2022-01-08 RX ADMIN — DICLOFENAC SODIUM 2 G: 10 GEL TOPICAL at 21:17

## 2022-01-08 RX ADMIN — FUROSEMIDE 20 MG: 10 INJECTION, SOLUTION INTRAMUSCULAR; INTRAVENOUS at 09:07

## 2022-01-08 RX ADMIN — DICLOFENAC SODIUM 2 G: 10 GEL TOPICAL at 17:24

## 2022-01-08 RX ADMIN — ACETAMINOPHEN 650 MG: 325 TABLET, FILM COATED ORAL at 00:11

## 2022-01-08 RX ADMIN — POTASSIUM CHLORIDE 20 MEQ: 1500 TABLET, EXTENDED RELEASE ORAL at 09:07

## 2022-01-08 RX ADMIN — ACETAMINOPHEN 650 MG: 325 TABLET, FILM COATED ORAL at 13:38

## 2022-01-08 RX ADMIN — Medication 3 MG: at 00:11

## 2022-01-08 RX ADMIN — DICYCLOMINE HYDROCHLORIDE 20 MG: 20 TABLET ORAL at 00:11

## 2022-01-08 RX ADMIN — ACETAMINOPHEN 650 MG: 325 TABLET, FILM COATED ORAL at 21:17

## 2022-01-08 RX ADMIN — DICYCLOMINE HYDROCHLORIDE 20 MG: 20 TABLET ORAL at 16:16

## 2022-01-08 RX ADMIN — POTASSIUM CHLORIDE 20 MEQ: 1500 TABLET, EXTENDED RELEASE ORAL at 17:22

## 2022-01-08 RX ADMIN — FUROSEMIDE 20 MG: 10 INJECTION, SOLUTION INTRAMUSCULAR; INTRAVENOUS at 16:16

## 2022-01-08 RX ADMIN — KETOROLAC TROMETHAMINE 15 MG: 30 INJECTION, SOLUTION INTRAMUSCULAR; INTRAVENOUS at 16:15

## 2022-01-08 RX ADMIN — DICYCLOMINE HYDROCHLORIDE 20 MG: 20 TABLET ORAL at 11:30

## 2022-01-08 RX ADMIN — ENOXAPARIN SODIUM 40 MG: 40 INJECTION SUBCUTANEOUS at 09:06

## 2022-01-08 RX ADMIN — DICYCLOMINE HYDROCHLORIDE 20 MG: 20 TABLET ORAL at 06:13

## 2022-01-08 RX ADMIN — DICLOFENAC SODIUM 2 G: 10 GEL TOPICAL at 00:12

## 2022-01-08 RX ADMIN — ACETAMINOPHEN 650 MG: 325 TABLET, FILM COATED ORAL at 09:07

## 2022-01-08 RX ADMIN — NICOTINE 1 PATCH: 7 PATCH, EXTENDED RELEASE TRANSDERMAL at 09:06

## 2022-01-08 RX ADMIN — DICLOFENAC SODIUM 2 G: 10 GEL TOPICAL at 11:30

## 2022-01-08 NOTE — PLAN OF CARE
Problem: PAIN - ADULT  Goal: Verbalizes/displays adequate comfort level or baseline comfort level  Description: Interventions:  - Encourage patient to monitor pain and request assistance  - Assess pain using appropriate pain scale  - Administer analgesics based on type and severity of pain and evaluate response  - Implement non-pharmacological measures as appropriate and evaluate response  - Consider cultural and social influences on pain and pain management  - Notify physician/advanced practitioner if interventions unsuccessful or patient reports new pain  Outcome: Progressing     Problem: INFECTION - ADULT  Goal: Absence or prevention of progression during hospitalization  Description: INTERVENTIONS:  - Assess and monitor for signs and symptoms of infection  - Monitor lab/diagnostic results  - Monitor all insertion sites, i e  indwelling lines, tubes, and drains  - Monitor endotracheal if appropriate and nasal secretions for changes in amount and color  - [place] appropriate cooling/warming therapies per order  - Administer medications as ordered  - Instruct and encourage patient and family to use good hand hygiene technique  - Identify and instruct in appropriate isolation precautions for identified infection/condition  Outcome: Progressing  Goal: Absence of fever/infection during neutropenic period  Description: INTERVENTIONS:  - Monitor WBC    Outcome: Progressing     Problem: SAFETY ADULT  Goal: Patient will remain free of falls  Description: INTERVENTIONS:  - Educate patient/family on patient safety including physical limitations  - Instruct patient to call for assistance with activity   - Consult OT/PT to assist with strengthening/mobility   - Keep Call bell within reach  - Keep bed low and locked with side rails adjusted as appropriate  - Keep care items and personal belongings within reach  - Initiate and maintain comfort rounds  - Make Fall Risk Sign visible to staff  - Apply yellow socks and bracelet for high fall risk patients  - Consider moving patient to room near nurses station  Outcome: Progressing  Goal: Maintain or return to baseline ADL function  Description: INTERVENTIONS:  -  Assess patient's ability to carry out ADLs; assess patient's baseline for ADL function and identify physical deficits which impact ability to perform ADLs (bathing, care of mouth/teeth, toileting, grooming, dressing, etc )  - Assess/evaluate cause of self-care deficits   - Assess range of motion  - Assess patient's mobility; develop plan if impaired  - Assess patient's need for assistive devices and provide as appropriate  - Encourage maximum independence but intervene and supervise when necessary  - Involve family in performance of ADLs  - Assess for home care needs following discharge   - Consider OT consult to assist with ADL evaluation and planning for discharge  - Provide patient education as appropriate  Outcome: Progressing  Goal: Maintains/Returns to pre admission functional level  Description: INTERVENTIONS:  - Perform BMAT or MOVE assessment daily    - Set and communicate daily mobility goal to care team and patient/family/caregiver     - Collaborate with rehabilitation services on mobility goals if consulted  - Out of bed for toileting  - Record patient progress and toleration of activity level   Outcome: Progressing     Problem: DISCHARGE PLANNING  Goal: Discharge to home or other facility with appropriate resources  Description: INTERVENTIONS:  - Identify barriers to discharge w/patient and caregiver  - Arrange for needed discharge resources and transportation as appropriate  - Identify discharge learning needs (meds, wound care, etc )  - Arrange for interpretive services to assist at discharge as needed  - Refer to Case Management Department for coordinating discharge planning if the patient needs post-hospital services based on physician/advanced practitioner order or complex needs related to functional status, cognitive ability, or social support system  Outcome: Progressing     Problem: Knowledge Deficit  Goal: Patient/family/caregiver demonstrates understanding of disease process, treatment plan, medications, and discharge instructions  Description: Complete learning assessment and assess knowledge base    Interventions:  - Provide teaching at level of understanding  - Provide teaching via preferred learning methods  Outcome: Progressing

## 2022-01-08 NOTE — PLAN OF CARE
Problem: PAIN - ADULT  Goal: Verbalizes/displays adequate comfort level or baseline comfort level  Description: Interventions:  - Encourage patient to monitor pain and request assistance  - Assess pain using appropriate pain scale  - Administer analgesics based on type and severity of pain and evaluate response  - Implement non-pharmacological measures as appropriate and evaluate response  - Consider cultural and social influences on pain and pain management  - Notify physician/advanced practitioner if interventions unsuccessful or patient reports new pain  Outcome: Progressing     Problem: INFECTION - ADULT  Goal: Absence or prevention of progression during hospitalization  Description: INTERVENTIONS:  - Assess and monitor for signs and symptoms of infection  - Monitor lab/diagnostic results  - Monitor all insertion sites, i e  indwelling lines, tubes, and drains  - Monitor endotracheal if appropriate and nasal secretions for changes in amount and color  - [place] appropriate cooling/warming therapies per order  - Administer medications as ordered  - Instruct and encourage patient and family to use good hand hygiene technique  - Identify and instruct in appropriate isolation precautions for identified infection/condition  Outcome: Progressing     Problem: DISCHARGE PLANNING  Goal: Discharge to home or other facility with appropriate resources  Description: INTERVENTIONS:  - Identify barriers to discharge w/patient and caregiver  - Arrange for needed discharge resources and transportation as appropriate  - Identify discharge learning needs (meds, wound care, etc )  - Arrange for interpretive services to assist at discharge as needed  - Refer to Case Management Department for coordinating discharge planning if the patient needs post-hospital services based on physician/advanced practitioner order or complex needs related to functional status, cognitive ability, or social support system  Outcome: Progressing Problem: Knowledge Deficit  Goal: Patient/family/caregiver demonstrates understanding of disease process, treatment plan, medications, and discharge instructions  Description: Complete learning assessment and assess knowledge base    Interventions:  - Provide teaching at level of understanding  - Provide teaching via preferred learning methods  Outcome: Progressing     Problem: CARDIOVASCULAR - ADULT  Goal: Maintains optimal cardiac output and hemodynamic stability  Description: INTERVENTIONS:  - Monitor I/O, vital signs and rhythm  - Monitor for S/S and trends of decreased cardiac output  - Administer and titrate ordered vasoactive medications to optimize hemodynamic stability  - Assess quality of pulses, skin color and temperature  - Assess for signs of decreased coronary artery perfusion  - Instruct patient to report change in severity of symptoms  Outcome: Progressing  Goal: Absence of cardiac dysrhythmias or at baseline rhythm  Description: INTERVENTIONS:  - Continuous cardiac monitoring, vital signs, obtain 12 lead EKG if ordered  - Administer antiarrhythmic and heart rate control medications as ordered  - Monitor electrolytes and administer replacement therapy as ordered  Outcome: Progressing     Problem: RESPIRATORY - ADULT  Goal: Achieves optimal ventilation and oxygenation  Description: INTERVENTIONS:  - Assess for changes in respiratory status  - Assess for changes in mentation and behavior  - Position to facilitate oxygenation and minimize respiratory effort  - Oxygen administered by appropriate delivery if ordered  - Initiate smoking cessation education as indicated  - Encourage broncho-pulmonary hygiene including cough, deep breathe, Incentive Spirometry  - Assess the need for suctioning and aspirate as needed  - Assess and instruct to report SOB or any respiratory difficulty  - Respiratory Therapy support as indicated  Outcome: Progressing     Problem: METABOLIC, FLUID AND ELECTROLYTES - ADULT  Goal: Electrolytes maintained within normal limits  Description: INTERVENTIONS:  - Monitor labs and assess patient for signs and symptoms of electrolyte imbalances  - Administer electrolyte replacement as ordered  - Monitor response to electrolyte replacements, including repeat lab results as appropriate  - Instruct patient on fluid and nutrition as appropriate  Outcome: Progressing  Goal: Fluid balance maintained  Description: INTERVENTIONS:  - Monitor labs   - Monitor I/O and WT  - Instruct patient on fluid and nutrition as appropriate  - Assess for signs & symptoms of volume excess or deficit  Outcome: Progressing

## 2022-01-08 NOTE — PROGRESS NOTES
Advanced Heart Failure / Pulmonary Hypertension Service Progress Note    Mcaho Lawton 63 y o  female   MRN: [id number]  Unit/Bed#: [facility] 829-01; Encounter: [id number]    Assessment:  Principal Problem:    SINGH (dyspnea on exertion)  Active Problems:    Abdominal pain    Hyperlipidemia    Pulmonary nodule    Elevated troponin    Smoker    Hypertension    HFrEF (heart failure with reduced ejection fraction) (HCC)    Acute on chronic combined systolic and diastolic congestive heart failure (HCC)    COVID-19 virus infection    Arthralgia of multiple joints    Polyarticular arthritis      Subjective:   Macho Lawton is a 29-year-old woman with PMH as below who presented to [facility] on 01/05/2022 with worsening SOB/SINGH for 3 weeks in setting of poor adherence to outpatient HF medications  NT-proBNP 6804; started on IV Lasix and restarted on losartan  Patient seen and examined  Stopped taking cardiac medications about 2 months after initial diagnosis in 05/2021  Patient seen and examined  No significant events overnight  Primary complaint of chronic pain and prescribed pain regimen (requesting something stronger than Tylenol)  Denies SOB, PND, and orthopnea  Discussed importance of medication compliance and follow-up appointments  Remains uninterested in LifeVest at this time  Objective: Intake/ Output: Not accurate  Weight: Not done since 01/06  Telemetry: SR, rates in 50-60s  Today's Plan:   Continue IV Lasix  May be able to transition to PO in next 24-48 hours   Started on spironolactone this AM     Potassium 3 5 this AM  Will give additional 20 mEq PO potassium this evening   Plan to add on BB tomorrow   Remains uninterested in LifeVest at this time  Plan:  OSPYS-18 virus infection              Diagnosed 01/06/2022; not vaccinated                 Management per primary team       Acute on chronic HFrEF; LVEF 25%; LVIDd 6 cm; NYHA III; ACC/AHA Stage C Etiology: uncontrolled HTN + underlying CAD  TTE 05/01/2021: LVEF 25%  LVIDd 6 cm  Grade 2 DD  Normal RV  Mild MR  LHC 05/05/2021: 50% stenosis mid LAD  50% stenosis of OM1 origin  60% stenosis OM1  60% stenosis proximal RCA  cMRI 05/06/2021: LVEF 34%  CO 5 4 L/min  Normal RV  "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 "              TTE 01/06/2022: LVEF 25%  Mildly dilated RV with mildly reduced RVSF  TATIANA  Mild MR and TR  PASP 54 mmHg       Neurohormonal Blockade:  --Beta Blocker: No (prescribed metoprolol succinate 25 mg q12 hours)  --ARNi / ACEi / ARB: losartan 50 mg daily  --Aldosterone Antagonist: spironolactone 25 mg daily  --SGLT2 Inhibitor: No    --Home Diuretic: PRN Lasix 20 mg   --Inpatient Diuretic: IV Lasix 20 mg BID with potassium 20 mEq BID      Sudden Cardiac Death Risk Reduction:  --LVEF 25%  Refused LifeVest during 05/2021 admission  --Not a candidate for ICD at this time due to poor adherence  At least 3 consecutive months of optimized medical therapy needed to meet indications for ICD       Electrical Resynchronization:  --Candidacy for BiV device: narrow QRS     Advanced Therapies (if appropriate): Will continue to monitor      Hypertension   Tobacco abuse  Chronic pain syndrome  Positive hepatitis C antibody  Reflex sympathetic dystrophy  Unstable housing    Vitals:   Blood pressure 134/90, pulse 73, temperature 97 9 °F (36 6 °C), resp  rate 16, height 5' 4" (1 626 m), weight 68 9 kg (152 lb), SpO2 93 %  Body mass index is 26 09 kg/m²  I/O last 3 completed shifts:   In: 460 [P O :460]  Out: -     Wt Readings from Last 3 Encounters:   01/06/22 68 9 kg (152 lb)   05/07/21 69 kg (152 lb 3 2 oz)   05/06/21 68 8 kg (151 lb 12 oz)     Vitals:    01/07/22 0845 01/07/22 1900 01/07/22 2138 01/08/22 0717   BP: 146/100 140/96 142/86 134/90   BP Location: Right arm Right arm     Pulse: 76 79 80 73   Resp: 20 20 16    Temp:   98 2 °F (36 8 °C) 97 9 °F (36 6 °C)   TempSrc:       SpO2: 96% 98% 90% 93%   Weight:       Height:           Physical Exam  Vitals reviewed  Constitutional:       General: She is awake  She is not in acute distress  Appearance: Normal appearance  She is well-developed and normal weight  HENT:      Head: Normocephalic  Nose: Nose normal       Mouth/Throat:      Mouth: Mucous membranes are moist    Eyes:      General: No scleral icterus  Conjunctiva/sclera: Conjunctivae normal    Neck:      Vascular: JVD present  Trachea: No tracheal deviation  Cardiovascular:      Rate and Rhythm: Normal rate and regular rhythm  No extrasystoles are present  Pulses: Normal pulses  Heart sounds: No murmur heard  Pulmonary:      Effort: Pulmonary effort is normal  No tachypnea, bradypnea, accessory muscle usage or respiratory distress  Breath sounds: Normal air entry  No decreased air movement  No decreased breath sounds, wheezing or rales  Abdominal:      General: Bowel sounds are normal  There is distension  Palpations: Abdomen is soft  Tenderness: There is no abdominal tenderness  Musculoskeletal:      Cervical back: Neck supple  Right lower leg: No edema  Left lower leg: No edema  Skin:     General: Skin is warm and dry  Coloration: Skin is not jaundiced or pale  Neurological:      General: No focal deficit present  Mental Status: She is alert and oriented to person, place, and time  Psychiatric:         Attention and Perception: Attention normal          Mood and Affect: Mood and affect normal          Speech: Speech normal          Behavior: Behavior normal  Behavior is cooperative  Thought Content:  Thought content normal      Central Line (day, reason): No    Saravia Catheter (day, reason): No        Current Facility-Administered Medications:     acetaminophen (TYLENOL) tablet 650 mg, 650 mg, Oral, Q6H, Kraig Silva, MD, 650 mg at 01/08/22 1338    albuterol (PROVENTIL HFA,VENTOLIN HFA) inhaler 2 puff, 2 puff, Inhalation, Q4H PRN, Selma Rouse MD, 2 puff at 01/06/22 1734    atorvastatin (LIPITOR) tablet 40 mg, 40 mg, Oral, After Dinner, Selma Rouse MD, 40 mg at 01/07/22 1904    Diclofenac Sodium (VOLTAREN) 1 % topical gel 2 g, 2 g, Topical, 4x Daily, Selma Rouse MD, 2 g at 01/08/22 1130    dicyclomine (BENTYL) tablet 20 mg, 20 mg, Oral, 4x Daily (AC & HS), Jermaine Ga MD, 20 mg at 01/08/22 1130    enoxaparin (LOVENOX) subcutaneous injection 40 mg, 40 mg, Subcutaneous, Daily, Selma Rouse MD, 40 mg at 01/08/22 0906    furosemide (LASIX) injection 20 mg, 20 mg, Intravenous, BID (diuretic), Ernesto Dumont PA-C, 20 mg at 01/08/22 0907    Labetalol HCl (NORMODYNE) injection 5 mg, 5 mg, Intravenous, Q6H PRN, Jermaine Ga MD    losartan (COZAAR) tablet 50 mg, 50 mg, Oral, Daily, Selma Rouse MD, 50 mg at 01/08/22 0907    nicotine (NICODERM CQ) 7 mg/24hr TD 24 hr patch 1 patch, 1 patch, Transdermal, Daily, Selma Rouse MD, 1 patch at 01/08/22 0906    ondansetron (ZOFRAN) injection 4 mg, 4 mg, Intravenous, Q6H PRN, Selma Rouse MD    potassium chloride (K-DUR,KLOR-CON) CR tablet 20 mEq, 20 mEq, Oral, BID, Ernesto Dumont PA-C, 20 mEq at 01/08/22 0767    spironolactone (ALDACTONE) tablet 25 mg, 25 mg, Oral, Daily, Leona Bower MD, 25 mg at 01/08/22 0906    Labs & Results:  Results from last 7 days   Lab Units 01/06/22  0027   CK TOTAL U/L 40     Results from last 7 days   Lab Units 01/08/22  0537 01/07/22  0646 01/06/22  0540   WBC Thousand/uL 5 07 5 57 5 53   HEMOGLOBIN g/dL 13 1 13 1 12 2   HEMATOCRIT % 40 2 38 5 36 7   PLATELETS Thousands/uL 195 205 210         Results from last 7 days   Lab Units 01/08/22  0537 01/07/22  0646 01/06/22  1428 01/06/22  0540 01/06/22  0540 01/05/22 2001 01/05/22 2001   POTASSIUM mmol/L 3 5 3 7 3 4*   < > 2 8*   < > 3 2*   CHLORIDE mmol/L 108 109*  --   --  106 < > 107   CO2 mmol/L 28 27  --   --  28   < > 24   BUN mg/dL 28* 23  --   --  16   < > 12   CREATININE mg/dL 1 00 1 09  --   --  1 00   < > 0 93   CALCIUM mg/dL 9 1 9 3  --   --  8 8   < > 9 8   ALK PHOS U/L  --   --   --   --   --   --  92   ALT U/L  --   --   --   --   --   --  31   AST U/L  --   --   --   --   --   --  26    < > = values in this interval not displayed           Jani Quintanilla PA-C

## 2022-01-09 LAB
ANION GAP SERPL CALCULATED.3IONS-SCNC: 5 MMOL/L (ref 4–13)
BASOPHILS # BLD AUTO: 0.04 THOUSANDS/ΜL (ref 0–0.1)
BASOPHILS NFR BLD AUTO: 1 % (ref 0–1)
BUN SERPL-MCNC: 34 MG/DL (ref 5–25)
CALCIUM SERPL-MCNC: 9.3 MG/DL (ref 8.3–10.1)
CHLORIDE SERPL-SCNC: 108 MMOL/L (ref 100–108)
CO2 SERPL-SCNC: 26 MMOL/L (ref 21–32)
CREAT SERPL-MCNC: 1.15 MG/DL (ref 0.6–1.3)
EOSINOPHIL # BLD AUTO: 0.1 THOUSAND/ΜL (ref 0–0.61)
EOSINOPHIL NFR BLD AUTO: 2 % (ref 0–6)
ERYTHROCYTE [DISTWIDTH] IN BLOOD BY AUTOMATED COUNT: 13 % (ref 11.6–15.1)
GFR SERPL CREATININE-BSD FRML MDRD: 52 ML/MIN/1.73SQ M
GLUCOSE SERPL-MCNC: 97 MG/DL (ref 65–140)
HCT VFR BLD AUTO: 40.7 % (ref 34.8–46.1)
HGB BLD-MCNC: 13.3 G/DL (ref 11.5–15.4)
IMM GRANULOCYTES # BLD AUTO: 0.02 THOUSAND/UL (ref 0–0.2)
IMM GRANULOCYTES NFR BLD AUTO: 0 % (ref 0–2)
LYMPHOCYTES # BLD AUTO: 1.55 THOUSANDS/ΜL (ref 0.6–4.47)
LYMPHOCYTES NFR BLD AUTO: 29 % (ref 14–44)
MCH RBC QN AUTO: 29.6 PG (ref 26.8–34.3)
MCHC RBC AUTO-ENTMCNC: 32.7 G/DL (ref 31.4–37.4)
MCV RBC AUTO: 90 FL (ref 82–98)
MONOCYTES # BLD AUTO: 0.89 THOUSAND/ΜL (ref 0.17–1.22)
MONOCYTES NFR BLD AUTO: 17 % (ref 4–12)
NEUTROPHILS # BLD AUTO: 2.76 THOUSANDS/ΜL (ref 1.85–7.62)
NEUTS SEG NFR BLD AUTO: 51 % (ref 43–75)
NRBC BLD AUTO-RTO: 0 /100 WBCS
PLATELET # BLD AUTO: 246 THOUSANDS/UL (ref 149–390)
PMV BLD AUTO: 11.2 FL (ref 8.9–12.7)
POTASSIUM SERPL-SCNC: 3.9 MMOL/L (ref 3.5–5.3)
RBC # BLD AUTO: 4.5 MILLION/UL (ref 3.81–5.12)
SODIUM SERPL-SCNC: 139 MMOL/L (ref 136–145)
WBC # BLD AUTO: 5.36 THOUSAND/UL (ref 4.31–10.16)

## 2022-01-09 PROCEDURE — 99232 SBSQ HOSP IP/OBS MODERATE 35: CPT | Performed by: PHYSICIAN ASSISTANT

## 2022-01-09 PROCEDURE — 97163 PT EVAL HIGH COMPLEX 45 MIN: CPT

## 2022-01-09 PROCEDURE — 80048 BASIC METABOLIC PNL TOTAL CA: CPT | Performed by: STUDENT IN AN ORGANIZED HEALTH CARE EDUCATION/TRAINING PROGRAM

## 2022-01-09 PROCEDURE — 99232 SBSQ HOSP IP/OBS MODERATE 35: CPT | Performed by: INTERNAL MEDICINE

## 2022-01-09 PROCEDURE — 85025 COMPLETE CBC W/AUTO DIFF WBC: CPT | Performed by: STUDENT IN AN ORGANIZED HEALTH CARE EDUCATION/TRAINING PROGRAM

## 2022-01-09 RX ORDER — SPIRONOLACTONE 25 MG/1
25 TABLET ORAL DAILY
Status: DISCONTINUED | OUTPATIENT
Start: 2022-01-10 | End: 2022-01-10 | Stop reason: HOSPADM

## 2022-01-09 RX ORDER — LANOLIN ALCOHOL/MO/W.PET/CERES
6 CREAM (GRAM) TOPICAL
Status: DISCONTINUED | OUTPATIENT
Start: 2022-01-09 | End: 2022-01-10 | Stop reason: HOSPADM

## 2022-01-09 RX ORDER — METOPROLOL SUCCINATE 25 MG/1
12.5 TABLET, EXTENDED RELEASE ORAL EVERY 12 HOURS SCHEDULED
Status: DISCONTINUED | OUTPATIENT
Start: 2022-01-09 | End: 2022-01-10 | Stop reason: HOSPADM

## 2022-01-09 RX ORDER — FUROSEMIDE 20 MG/1
20 TABLET ORAL DAILY
Status: DISCONTINUED | OUTPATIENT
Start: 2022-01-10 | End: 2022-01-10 | Stop reason: HOSPADM

## 2022-01-09 RX ADMIN — DICLOFENAC SODIUM 2 G: 10 GEL TOPICAL at 12:22

## 2022-01-09 RX ADMIN — DICLOFENAC SODIUM 2 G: 10 GEL TOPICAL at 21:01

## 2022-01-09 RX ADMIN — POTASSIUM CHLORIDE 20 MEQ: 1500 TABLET, EXTENDED RELEASE ORAL at 09:50

## 2022-01-09 RX ADMIN — Medication 6 MG: at 22:45

## 2022-01-09 RX ADMIN — LOSARTAN POTASSIUM 50 MG: 50 TABLET, FILM COATED ORAL at 09:50

## 2022-01-09 RX ADMIN — DICYCLOMINE HYDROCHLORIDE 20 MG: 20 TABLET ORAL at 17:11

## 2022-01-09 RX ADMIN — SPIRONOLACTONE 25 MG: 25 TABLET ORAL at 09:50

## 2022-01-09 RX ADMIN — ENOXAPARIN SODIUM 40 MG: 40 INJECTION SUBCUTANEOUS at 09:50

## 2022-01-09 RX ADMIN — DICYCLOMINE HYDROCHLORIDE 20 MG: 20 TABLET ORAL at 09:50

## 2022-01-09 RX ADMIN — POTASSIUM CHLORIDE 20 MEQ: 1500 TABLET, EXTENDED RELEASE ORAL at 17:11

## 2022-01-09 RX ADMIN — DICYCLOMINE HYDROCHLORIDE 20 MG: 20 TABLET ORAL at 12:17

## 2022-01-09 RX ADMIN — ACETAMINOPHEN 650 MG: 325 TABLET, FILM COATED ORAL at 09:50

## 2022-01-09 RX ADMIN — FUROSEMIDE 20 MG: 10 INJECTION, SOLUTION INTRAMUSCULAR; INTRAVENOUS at 09:51

## 2022-01-09 RX ADMIN — METOPROLOL SUCCINATE 12.5 MG: 25 TABLET, EXTENDED RELEASE ORAL at 12:18

## 2022-01-09 RX ADMIN — DICYCLOMINE HYDROCHLORIDE 20 MG: 20 TABLET ORAL at 21:01

## 2022-01-09 RX ADMIN — ACETAMINOPHEN 650 MG: 325 TABLET, FILM COATED ORAL at 21:10

## 2022-01-09 RX ADMIN — DICLOFENAC SODIUM 2 G: 10 GEL TOPICAL at 10:03

## 2022-01-09 RX ADMIN — NICOTINE 1 PATCH: 7 PATCH, EXTENDED RELEASE TRANSDERMAL at 09:50

## 2022-01-09 RX ADMIN — ATORVASTATIN CALCIUM 40 MG: 40 TABLET, FILM COATED ORAL at 17:11

## 2022-01-09 RX ADMIN — METOPROLOL SUCCINATE 12.5 MG: 25 TABLET, EXTENDED RELEASE ORAL at 21:10

## 2022-01-09 RX ADMIN — DICLOFENAC SODIUM 2 G: 10 GEL TOPICAL at 17:14

## 2022-01-09 RX ADMIN — ACETAMINOPHEN 650 MG: 325 TABLET, FILM COATED ORAL at 13:48

## 2022-01-09 NOTE — PROGRESS NOTES
[address] [facility]  Progress Note - Melanie Augustin 1960, 63 y o  female MRN: [id number]  Unit/Bed#: [facility] 829-01 Encounter: [id number]  Primary Care Provider: No primary care provider on file  Date and time admitted to hospital: 1/5/2022  7:23 PM    Polyarticular arthritis  Assessment & Plan  · Patient presenting with 3 weeks history of asymmetric polyarticular joint pain associated with right knee, left ankle, right wrist, right 1st MCP, left 1st MCP  ·  Patient also reported mild swelling in these areas along with joint pain  · Patient states that the joint pain tends to resolve at one site and then move to another joint  · Patient was noted to have a small effusion on the right knee  · Ortho on board : patient refused knee aspiration   · Uric acid : normal, lyme panel : neg, anti CCP - pending, ESR - mildly elevated at 46, parvovirus Ab -negative   · X ray of the right wrist : degenerative changes of the 1st MCP, X ray rt knee : 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  8 mm intra-articular loose body suggested  · S/p lidocaine patch for right knee   · Pt is Hepatitis C ab +ve     PLAN :   · Follow pending labs   · [address] tylenol for OA   · Continue voltaren gel   · Consider rheumatology follow up as outpatient       COVID-19 virus infection  Assessment & Plan  Patient with no history of COVID vaccination, tested positive for COVID on 01/06/2022    - patient currently stable on room air, asymptomatic  - patient denies any fevers or chills  - placed on contact and airborne isolation    Acute on chronic combined systolic and diastolic congestive heart failure (HCC)  Assessment & Plan  Wt Readings from Last 3 Encounters:   01/09/22 65 3 kg (143 lb 15 4 oz)   05/07/21 69 kg (152 lb 3 2 oz)   05/06/21 68 8 kg (151 lb 12 oz)     · Patient with HFrEF 32% presented with 3 week history of shortness of breath, dyspnea on exertion, orthopnea and long-term medication noncompliance at home  ·  Likely concerning for acute on chronic CHF  · Patient also had a prior admission in 2021 with similar symptoms  ProBNP at 6804 at this admission  · New EF of 25 %  · HF team on board   · Transitioned to PO lasix 20 mg daily starting 1/10/22, patient was on 20 mg IV lasix BID  Restarted on low dose BB 12 5 mg PO BID on 1/9/22     - Plan as above  - Follow management per HF team -- likely discharge on 1/10/22 if patient able to tolerate low dose BB   - correct any electrolyte derangements--keep K >4 , Mag > 2   - placed on cardiac diet, low-sodium  - patient may benefit from outpatient cardiology follow-up after discharge      HFrEF (heart failure with reduced ejection fraction) [facility])  Assessment & Plan  Patient that heart failure with reduced EF, 32% with grade 2 diastolic dysfunction, severe diffuse hypokinesis, changes consistent with eccentric hypertrophy as of 05/2021      - patient is noncompliant with heart failure medications at baseline  - does not follow with a cardiologist in the outpatient setting  - HF team on board   - ECHO ( 1/6/22) shows EF - 25%, severely reduced systolic function, severe global hypokinesis and severely abnormal diastolic function   - Patient is on losartan, added low dose BB on 1/8/22, lasix 20 mg PO daily, spironolactone    PLAN :   · Follow management per HF team   · Patient will need to comply with GDMT --> to qualify for ICD if EF remains < / = 35 %   · Possible DC tomorrow if patient able to tolerate low dose BB       Hypertension  Assessment & Plan  History of hypertension   - was prescribed losartan 50 mg daily, furosemide 20 mg daily, metoprolol succinate 25 mg daily    Patient is not compliant with medications at home  -Transitioned to lasix 20 mg daily from tomorrow by cardiology, continue losartan 50 mg daily  - restarted low dose BB -12 5 mg PO BID   - Patient is also on spironolactone per cardiology   - Continue to monitor    Smoker  Assessment & Plan  H/o smoking  Currently smokes 4 cigarettes per day  - Patient provided with nicotine patch 7 mg while inpatient    Elevated troponin  Assessment & Plan  Likely related to myocardial injury secondary to ongoing CHF exacerbation    - Patient currently denies any chest pain or tightness          Pulmonary nodule  Assessment & Plan  · H/o 5 mm right middle lobe nodule  · Based on current Fleischner Society 2017 Guidelines on incidental pulmonary nodule, no routine follow-up is needed if the patient is considered low risk for lung cancer  ·  If the patient is considered high risk for lung cancer, 12 month follow-up non-contrast chest CT is recommended  Hyperlipidemia  Assessment & Plan  On atorvastatin 40 mg qd    Abdominal pain  Assessment & Plan  · Patient presented with intermittent generalized abdominal pain along with mild abdominal swelling  ·  Patient was also noted to have similar abdominal symptoms at last admission in 04/2021  · At that time, patient was noted to have some atherosclerosis of abdominal vessels (CT abdomen pelvis from that admission)  Her abdominal pain may be correlated to this  She may also benefit from additional diuresis to decrease possible third spacing of fluids in abdomen as a result of CHF   - Patient had mesenteric duplex and RUQ  U/S at previous admission which did not reveal any significant findings  - Was noted to be responsive to bentyl at last admission, ordered bentyl  - Will continue to monitor    * SINGH (dyspnea on exertion)  Assessment & Plan  · Patient with h/o HFrEF with last known EF at 32% from 04/2021 and grade 2 diastolic dysfunction, severe diffuse hypokinesis, changes consistent with eccentric hypertrophy  · Patient also had an LHC which revealed non-ischemic cardiomyopathy  · In context of patients recent h/o SOB, orthopnea, abdominal distension, this may be related to CHF exacerbation     · Patient endorsed that she is non-compliant with her medications  Patients Singh maybe related to pulmonary vascular congestion and non-compliance with medications  · Cardiology on board : patient was on lasix 20 mg IV BID, transitioned to 20 mg PO daily on 1/9/22  · ECHO shows new EF : 25%   · Patient has covid infection but appears to be asymptomatic at this time    PLAN :   · See further management per HF                INTERNAL MEDICINE RESIDENCY PROGRESS NOTE     Name: Jass Garcia   Age & Sex: 63 y o  female   MRN: [id number]  [facility]   Encounter: [id number]  Team: SOD Team B     PATIENT INFORMATION     Name: Jass Garcia   Age & Sex: 63 y o  female   MRN: [id number]  Hospital Stay Days: 3    ASSESSMENT/PLAN     Principal Problem:    SINGH (dyspnea on exertion)  Active Problems:    Abdominal pain    Hyperlipidemia    Pulmonary nodule    Elevated troponin    Smoker    Hypertension    HFrEF (heart failure with reduced ejection fraction) ([facility] [address] )    Acute on chronic combined systolic and diastolic congestive heart failure ([facility] [address] )    COVID-19 virus infection    Arthralgia of multiple joints    Polyarticular arthritis      Polyarticular arthritis  Assessment & Plan  · Patient presenting with 3 weeks history of asymmetric polyarticular joint pain associated with right knee, left ankle, right wrist, right 1st MCP, left 1st MCP  ·  Patient also reported mild swelling in these areas along with joint pain  · Patient states that the joint pain tends to resolve at one site and then move to another joint  · Patient was noted to have a small effusion on the right knee  · Ortho on board : patient refused knee aspiration   · Uric acid : normal, lyme panel : neg, anti CCP - pending, ESR - mildly elevated at 46, parvovirus Ab -negative   · X ray of the right wrist : degenerative changes of the 1st MCP, X ray rt knee : 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   8 mm intra-articular loose body suggested  · S/p lidocaine patch for right knee   · Pt is Hepatitis C ab +ve     PLAN :   · Follow pending labs   · [facility] tylenol for OA   · Continue voltaren gel   · Consider rheumatology follow up as outpatient       COVID-19 virus infection  Assessment & Plan  Patient with no history of COVID vaccination, tested positive for COVID on 01/06/2022  - patient currently stable on room air, asymptomatic  - patient denies any fevers or chills  - placed on contact and airborne isolation    Acute on chronic combined systolic and diastolic congestive heart failure (HCC)  Assessment & Plan  Wt Readings from Last 3 Encounters:   01/09/22 65 3 kg (143 lb 15 4 oz)   05/07/21 69 kg (152 lb 3 2 oz)   05/06/21 68 8 kg (151 lb 12 oz)     · Patient with HFrEF 32% presented with 3 week history of shortness of breath, dyspnea on exertion, orthopnea and long-term medication noncompliance at home  ·  Likely concerning for acute on chronic CHF  · Patient also had a prior admission in 2021 with similar symptoms  ProBNP at 6804 at this admission  · New EF of 25 %  · HF team on board   · Transitioned to PO lasix 20 mg daily starting 1/10/22, patient was on 20 mg IV lasix BID   Restarted on low dose BB 12 5 mg PO BID on 1/9/22     - Plan as above  - Follow management per HF team -- likely discharge on 1/10/22 if patient able to tolerate low dose BB   - correct any electrolyte derangements--keep K >4 , Mag > 2   - placed on cardiac diet, low-sodium  - patient may benefit from outpatient cardiology follow-up after discharge      HFrEF (heart failure with reduced ejection fraction) ([facility] [address] )  Assessment & Plan  Patient that heart failure with reduced EF, 32% with grade 2 diastolic dysfunction, severe diffuse hypokinesis, changes consistent with eccentric hypertrophy as of 05/2021      - patient is noncompliant with heart failure medications at baseline  - does not follow with a cardiologist in the outpatient setting  - HF team on board   - ECHO ( 1/6/22) shows EF - 25%, severely reduced systolic function, severe global hypokinesis and severely abnormal diastolic function   - Patient is on losartan, added low dose BB on 1/8/22, lasix 20 mg PO daily, spironolactone    PLAN :   · Follow management per HF team   · Patient will need to comply with GDMT --> to qualify for ICD if EF remains < / = 35 %   · Possible DC tomorrow if patient able to tolerate low dose BB       Hypertension  Assessment & Plan  History of hypertension   - was prescribed losartan 50 mg daily, furosemide 20 mg daily, metoprolol succinate 25 mg daily  Patient is not compliant with medications at home  -Transitioned to lasix 20 mg daily from tomorrow by cardiology, continue losartan 50 mg daily  - restarted low dose BB -12 5 mg PO BID   - Patient is also on spironolactone per cardiology   - Continue to monitor    [address]  H/o smoking  Currently smokes 4 cigarettes per day  - Patient provided with nicotine patch 7 mg while inpatient    Elevated troponin  Assessment & Plan  Likely related to myocardial injury secondary to ongoing CHF exacerbation    - Patient currently denies any chest pain or tightness          Pulmonary nodule  Assessment & Plan  · H/o 5 mm right middle lobe nodule  · Based on current Fleischner Society 2017 Guidelines on incidental pulmonary nodule, no routine follow-up is needed if the patient is considered low risk for lung cancer  ·  If the patient is considered high risk for lung cancer, 12 month follow-up non-contrast chest CT is recommended  Hyperlipidemia  Assessment & Plan  On atorvastatin 40 mg qd    Abdominal pain  Assessment & Plan  · Patient presented with intermittent generalized abdominal pain along with mild abdominal swelling  ·  Patient was also noted to have similar abdominal symptoms at last admission in 04/2021  · At that time, patient was noted to have some atherosclerosis of abdominal vessels (CT abdomen pelvis from that admission)   Her abdominal pain may be correlated to this  She may also benefit from additional diuresis to decrease possible third spacing of fluids in abdomen as a result of CHF   - Patient had mesenteric duplex and RUQ  U/S at previous admission which did not reveal any significant findings  - Was noted to be responsive to bentyl at last admission, ordered bentyl  - Will continue to monitor    * SINGH (dyspnea on exertion)  Assessment & Plan  · Patient with h/o HFrEF with last known EF at 32% from 2021 and grade 2 diastolic dysfunction, severe diffuse hypokinesis, changes consistent with eccentric hypertrophy  · Patient also had an LHC which revealed non-ischemic cardiomyopathy  · In context of patients recent h/o SOB, orthopnea, abdominal distension, this may be related to CHF exacerbation  · Patient endorsed that she is non-compliant with her medications  Patients Singh maybe related to pulmonary vascular congestion and non-compliance with medications  · Cardiology on board : patient was on lasix 20 mg IV BID, transitioned to 20 mg PO daily on 22  · ECHO shows new EF : 25%   · Patient has covid infection but appears to be asymptomatic at this time    PLAN :   · See further management per HF              Disposition: Likely discharge in the next 24 hrs pending transitioning to PO lasix and restarting low dose PO BB     SUBJECTIVE     Patient seen and examined  No acute events overnight  Patient is c/o pain all over the body  Does not c/o Sob, chest pain   No nausea, vomiting, diarrhea     OBJECTIVE     Vitals:    22 0558 22 0852 22 1142 22 1218   BP:  139/83  140/88   BP Location:    Left arm   Pulse:  72 61 64   Resp:  18 20    Temp:   97 5 °F (36 4 °C)    TempSrc:   Oral    SpO2:  96% 97%    Weight: 65 3 kg (143 lb 15 4 oz)      Height:          Temperature:   Temp (24hrs), Av °F (36 7 °C), Min:97 5 °F (36 4 °C), Max:98 4 °F (36 9 °C)    Temperature: 97 5 °F (36 4 °C)  Intake & Output:  I/O 01/07 0701  01/08 0700 01/08 0701  01/09 0700 01/09 0701  01/10 0700    P  O  460 680 220    IV Piggyback       Total Intake(mL/kg) 460 (6 7) 680 (10 4) 220 (3 4)    Net +460 +680 +220           Unmeasured Urine Occurrence  1 x 1 x        Weights:        Body mass index is 24 71 kg/m²  Weight (last 2 days)     Date/Time Weight    01/09/22 0558 65 3 (143 96)    01/09/22 0500 65 3 (143 96)        Physical Exam  Constitutional:       Appearance: Normal appearance  HENT:      Mouth/Throat:      Mouth: Mucous membranes are dry  Cardiovascular:      Rate and Rhythm: Normal rate and regular rhythm  Heart sounds: No murmur heard  No gallop  Pulmonary:      Breath sounds: Rales (fine bibasilar ) present  No wheezing  Abdominal:      General: Bowel sounds are normal       Palpations: Abdomen is soft  Tenderness: There is no abdominal tenderness  There is no guarding  Musculoskeletal:      Right lower leg: No edema  Left lower leg: No edema  Comments: Mild right knee tenderness   Neurological:      Mental Status: She is alert  LABORATORY DATA     Labs: I have personally reviewed pertinent reports  Results from last 7 days   Lab Units 01/09/22  0546 01/08/22  0537 01/07/22  0646   WBC Thousand/uL 5 36 5 07 5 57   HEMOGLOBIN g/dL 13 3 13 1 13 1   HEMATOCRIT % 40 7 40 2 38 5   PLATELETS Thousands/uL 246 195 205   NEUTROS PCT % 51 52 59   MONOS PCT % 17* 14* 11      Results from last 7 days   Lab Units 01/09/22  0546 01/08/22  0537 01/07/22  0646 01/06/22  0540 01/05/22 2001   POTASSIUM mmol/L 3 9 3 5 3 7   < > 3 2*   CHLORIDE mmol/L 108 108 109*   < > 107   CO2 mmol/L 26 28 27   < > 24   BUN mg/dL 34* 28* 23   < > 12   CREATININE mg/dL 1 15 1 00 1 09   < > 0 93   CALCIUM mg/dL 9 3 9 1 9 3   < > 9 8   ALK PHOS U/L  --   --   --   --  92   ALT U/L  --   --   --   --  31   AST U/L  --   --   --   --  26    < > = values in this interval not displayed       Results from last 7 days   Lab Units 01/08/22  0537 01/07/22  0646 01/06/22  0540   MAGNESIUM mg/dL 2 2 2 5 2 5     Results from last 7 days   Lab Units 01/06/22  0540   PHOSPHORUS mg/dL 3 3                    IMAGING & DIAGNOSTIC TESTING     Radiology Results: I have personally reviewed pertinent reports  XR chest 1 view portable    Result Date: 1/6/2022  Impression: Cardiomegaly  CHF  Workstation performed: [id number]     XR wrist 3+ vw right    Result Date: 1/6/2022  Impression: No acute osseous abnormality  Workstation performed: [id number]     XR hand 3+ vw left    Result Date: 1/7/2022  Impression: No acute osseous abnormality  Workstation performed: [id number]     XR hand 3+ vw right    Result Date: 1/7/2022  Impression: No acute osseous abnormality  Workstation performed: [id number]     XR knee 3 vw right non injury    Result Date: 1/6/2022  Impression: No acute osseous abnormality  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  8 mm intra-articular loose body suggested  Workstation performed: [id number]     XR ankle 3+ vw left    Result Date: 1/6/2022  Impression: No acute osseous abnormality  Degenerative changes as described  Workstation performed: [id number]     Other Diagnostic Testing: I have personally reviewed pertinent reports      ACTIVE MEDICATIONS     Current Facility-Administered Medications   Medication Dose Route Frequency    acetaminophen (TYLENOL) tablet 650 mg  650 mg Oral Q6H    albuterol (PROVENTIL HFA,VENTOLIN HFA) inhaler 2 puff  2 puff Inhalation Q4H PRN    atorvastatin (LIPITOR) tablet 40 mg  40 mg Oral After Dinner    Diclofenac Sodium (VOLTAREN) 1 % topical gel 2 g  2 g Topical 4x Daily    dicyclomine (BENTYL) tablet 20 mg  20 mg Oral 4x Daily (AC & HS)    enoxaparin (LOVENOX) subcutaneous injection 40 mg  40 mg Subcutaneous Daily    [START ON 1/10/2022] furosemide (LASIX) tablet 20 mg  20 mg Oral Daily    Labetalol HCl (NORMODYNE) injection 5 mg  5 mg Intravenous Q6H PRN    losartan (COZAAR) tablet 50 mg  50 mg Oral Daily    metoprolol succinate (TOPROL-XL) 24 hr tablet 12 5 mg  12 5 mg Oral Q12H [address]    nicotine (NICODERM CQ) 7 mg/24hr TD 24 hr patch 1 patch  1 patch Transdermal Daily    ondansetron (ZOFRAN) injection 4 mg  4 mg Intravenous Q6H PRN    potassium chloride (K-DUR,KLOR-CON) CR tablet 20 mEq  20 mEq Oral BID    [START ON 1/10/2022] spironolactone (ALDACTONE) tablet 25 mg  25 mg Oral Daily       VTE Pharmacologic Prophylaxis: Enoxaparin (Lovenox)  VTE Mechanical Prophylaxis: sequential compression device    Portions of the record may have been created with voice recognition software  Occasional wrong word or "sound a like" substitutions may have occurred due to the inherent limitations of voice recognition software    Read the chart carefully and recognize, using context, where substitutions have occurred   ==  Kadie Martínez MD  [address]  Internal Medicine Residency PGY-3

## 2022-01-09 NOTE — ASSESSMENT & PLAN NOTE
· Patient presented with intermittent generalized abdominal pain along with mild abdominal swelling  ·  Patient was also noted to have similar abdominal symptoms at last admission in 04/2021  · At that time, patient was noted to have some atherosclerosis of abdominal vessels (CT abdomen pelvis from that admission)  Her abdominal pain may be correlated to this   She may also benefit from additional diuresis to decrease possible third spacing of fluids in abdomen as a result of CHF   - Patient had mesenteric duplex and RUQ  U/S at previous admission which did not reveal any significant findings  - Was noted to be responsive to bentyl at last admission, ordered bentyl  - Will continue to monitor

## 2022-01-09 NOTE — ASSESSMENT & PLAN NOTE
· Patient presenting with 3 weeks history of asymmetric polyarticular joint pain associated with right knee, left ankle, right wrist, right 1st MCP, left 1st MCP  ·  Patient also reported mild swelling in these areas along with joint pain  · Patient states that the joint pain tends to resolve at one site and then move to another joint  · Patient was noted to have a small effusion on the right knee  · Ortho on board : patient refused knee aspiration   · Uric acid : normal, lyme panel : neg, anti CCP - pending, ESR - mildly elevated at 46, parvovirus Ab -negative   · X ray of the right wrist : degenerative changes of the 1st MCP, X ray rt knee : 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   8 mm intra-articular loose body suggested  · S/p lidocaine patch for right knee   · Pt is Hepatitis C ab +ve     PLAN :   · Follow pending labs   · [address] tylenol for OA   · Continue voltaren gel   · Consider rheumatology follow up as outpatient

## 2022-01-09 NOTE — ASSESSMENT & PLAN NOTE
Wt Readings from Last 3 Encounters:   01/09/22 65 3 kg (143 lb 15 4 oz)   05/07/21 69 kg (152 lb 3 2 oz)   05/06/21 68 8 kg (151 lb 12 oz)     · Patient with HFrEF 32% presented with 3 week history of shortness of breath, dyspnea on exertion, orthopnea and long-term medication noncompliance at home  ·  Likely concerning for acute on chronic CHF  · Patient also had a prior admission in 2021 with similar symptoms  ProBNP at 6804 at this admission  · New EF of 25 %  · HF team on board   · Transitioned to PO lasix 20 mg daily starting 1/10/22, patient was on 20 mg IV lasix BID   Restarted on low dose BB 12 5 mg PO BID on 1/9/22     - Plan as above  - Follow management per HF team -- likely discharge on 1/10/22 if patient able to tolerate low dose BB   - correct any electrolyte derangements--keep K >4 , Mag > 2   - placed on cardiac diet, low-sodium  - patient may benefit from outpatient cardiology follow-up after discharge

## 2022-01-09 NOTE — ASSESSMENT & PLAN NOTE
Patient that heart failure with reduced EF, 32% with grade 2 diastolic dysfunction, severe diffuse hypokinesis, changes consistent with eccentric hypertrophy as of 05/2021      - patient is noncompliant with heart failure medications at baseline  - does not follow with a cardiologist in the outpatient setting  - HF team on board   - ECHO ( 1/6/22) shows EF - 25%, severely reduced systolic function, severe global hypokinesis and severely abnormal diastolic function   - Patient is on losartan, added low dose BB on 1/8/22, lasix 20 mg PO daily, spironolactone    PLAN :   · Follow management per HF team   · Patient will need to comply with GDMT --> to qualify for ICD if EF remains < / = 35 %   · Possible DC tomorrow if patient able to tolerate low dose BB

## 2022-01-09 NOTE — PROGRESS NOTES
Advanced Heart Failure / Pulmonary Hypertension Service Progress Note    Sixto Keyes 63 y o  female   MRN: [id number]  Unit/Bed#: [facility] 829-01; Encounter: [id number]    Assessment:  Principal Problem:    SINGH (dyspnea on exertion)  Active Problems:    Abdominal pain    Hyperlipidemia    Pulmonary nodule    Elevated troponin    Smoker    Hypertension    HFrEF (heart failure with reduced ejection fraction) (HCC)    Acute on chronic combined systolic and diastolic congestive heart failure (HCC)    COVID-19 virus infection    Arthralgia of multiple joints    Polyarticular arthritis      Subjective:   Sixto Keyes is a 82-year-old woman with PMH as below who presented to [facility] on 01/05/2022 with worsening SOB/SINGH for 3 weeks in setting of poor adherence to outpatient HF medications  NT-proBNP 6804; started on IV Lasix and restarted on losartan  Patient seen and examined  Stopped taking cardiac medications about 2 months after initial diagnosis in 05/2021  Patient seen and examined  No significant events overnight  Primary complaint of chronic pain  Denies SOB, PND, and orthopnea  Objective: Intake/ Output: Not accurate  Weight: 143 lbs (152 lbs on 01/06)  Telemetry: SR, rates in 60s  NSVT overnight; 5-8 beats in length  Today's Plan:   Start PO Lasix 20 mg daily tomorrow, and continue on spironolactone   Start metoprolol succinate 12 5 mg q12 hours   Remains uninterested in LifeVest at this time   Likely stable for discharge tomorrow from HF standpoint, pending response to BB start  Plan:  [id number] virus infection              Diagnosed 01/06/2022; not vaccinated  Management per primary team       Acute on chronic HFrEF; LVEF 25%; LVIDd 6 cm; NYHA III; ACC/AHA Stage C              Etiology: uncontrolled HTN + underlying CAD  TTE 05/01/2021: LVEF 25%  LVIDd 6 cm  Grade 2 DD  Normal RV  Mild MR                 LHC 05/05/2021: 50% stenosis mid LAD  50% stenosis of OM1 origin  60% stenosis OM1  60% stenosis proximal RCA  cMRI 05/06/2021: LVEF 34%  CO 5 4 L/min  Normal RV  "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 "              TTE 01/06/2022: LVEF 25%  Mildly dilated RV with mildly reduced RVSF  TATIANA  Mild MR and TR  PASP 54 mmHg       Neurohormonal Blockade:  --Beta Blocker: metoprolol succinate 12 5 mg q12 hours (prescribed metoprolol succinate 25 mg q12 hours)  --ARNi / ACEi / ARB: losartan 50 mg daily  --Aldosterone Antagonist: spironolactone 25 mg daily  --SGLT2 Inhibitor: No    --Home Diuretic: PRN Lasix 20 mg   --Inpatient Diuretic: PO Lasix 20 mg daily with potassium 20 mEq BID      Sudden Cardiac Death Risk Reduction:  --LVEF 25%  Refused LifeVest during 05/2021 admission  --Not a candidate for ICD at this time due to poor adherence  At least 3 consecutive months of optimized medical therapy needed to meet indications for ICD       Electrical Resynchronization:  --Candidacy for BiV device: narrow QRS     Advanced Therapies (if appropriate): Will continue to monitor      Hypertension   Tobacco abuse  Chronic pain syndrome  Positive hepatitis C antibody  Reflex sympathetic dystrophy  Unstable housing    Vitals:   Blood pressure 139/83, pulse 72, temperature 98 4 °F (36 9 °C), resp  rate 18, height 5' 4" (1 626 m), weight 65 3 kg (143 lb 15 4 oz), SpO2 96 %  Body mass index is 24 71 kg/m²  I/O last 3 completed shifts:   In: 1140 [P O :1140]  Out: -     Wt Readings from Last 3 Encounters:   01/09/22 65 3 kg (143 lb 15 4 oz)   05/07/21 69 kg (152 lb 3 2 oz)   05/06/21 68 8 kg (151 lb 12 oz)     Vitals:    01/08/22 2304 01/09/22 0500 01/09/22 0558 01/09/22 0852   BP: 134/82   139/83   Pulse: 69   72   Resp: 16   18   Temp: 98 4 °F (36 9 °C)      TempSrc:       SpO2: 97%   96%   Weight:  65 3 kg (143 lb 15 4 oz) 65 3 kg (143 lb 15 4 oz)    Height:           Physical Exam  Vitals reviewed  Constitutional:       General: She is awake  She is not in acute distress  Appearance: Normal appearance  She is well-developed and normal weight  HENT:      Head: Normocephalic  Nose: Nose normal       Mouth/Throat:      Mouth: Mucous membranes are moist    Eyes:      General: No scleral icterus  Conjunctiva/sclera: Conjunctivae normal    Neck:      Vascular: No JVD  Trachea: No tracheal deviation  Cardiovascular:      Rate and Rhythm: Normal rate and regular rhythm  Occasional extrasystoles are present  Pulses: Normal pulses  Pulmonary:      Effort: Pulmonary effort is normal  No tachypnea, bradypnea, accessory muscle usage or respiratory distress  Breath sounds: Normal air entry  No decreased air movement  No decreased breath sounds, wheezing or rales  Abdominal:      General: Bowel sounds are normal  There is no distension  Palpations: Abdomen is soft  Tenderness: There is no abdominal tenderness  Musculoskeletal:      Cervical back: Neck supple  Right lower leg: No edema  Left lower leg: No edema  Skin:     General: Skin is warm and dry  Coloration: Skin is not jaundiced or pale  Neurological:      General: No focal deficit present  Mental Status: She is alert and oriented to person, place, and time  Psychiatric:         Attention and Perception: Attention normal          Mood and Affect: Affect normal  Mood is anxious  Speech: Speech normal          Behavior: Behavior normal  Behavior is cooperative  Thought Content:  Thought content normal      Central Line (day, reason): No    Saravia Catheter (day, reason): No      Current Facility-Administered Medications:     acetaminophen (TYLENOL) tablet 650 mg, 650 mg, Oral, Q6H, Kraig Silva MD, 650 mg at 01/09/22 0950    albuterol (PROVENTIL HFA,VENTOLIN HFA) inhaler 2 puff, 2 puff, Inhalation, Q4H PRN, Azalia Gaona MD, 2 puff at 01/06/22 5643   atorvastatin (LIPITOR) tablet 40 mg, 40 mg, Oral, After Last Harman MD, 40 mg at 01/08/22 1722    Diclofenac Sodium (VOLTAREN) 1 % topical gel 2 g, 2 g, Topical, 4x Daily, Cee Melendez MD, 2 g at 01/09/22 1003    dicyclomine (BENTYL) tablet 20 mg, 20 mg, Oral, 4x Daily (AC & HS), Galen Echavarria MD, 20 mg at 01/09/22 0950    enoxaparin (LOVENOX) subcutaneous injection 40 mg, 40 mg, Subcutaneous, Daily, Cee Melendez MD, 40 mg at 01/09/22 0950    [START ON 1/10/2022] furosemide (LASIX) tablet 20 mg, 20 mg, Oral, Daily, Filomena Bundy PA-C    Labetalol HCl (NORMODYNE) injection 5 mg, 5 mg, Intravenous, Q6H PRN, Galen Echavarria MD    losartan (COZAAR) tablet 50 mg, 50 mg, Oral, Daily, Cee Melendez MD, 50 mg at 01/09/22 0950    metoprolol succinate (TOPROL-XL) 24 hr tablet 12 5 mg, 12 5 mg, Oral, Q12H [address], Filomena Bundy PA-C    nicotine (NICODERM CQ) 7 mg/24hr TD 24 hr patch 1 patch, 1 patch, Transdermal, Daily, Cee Melendez MD, 1 patch at 01/09/22 0950    ondansetron (ZOFRAN) injection 4 mg, 4 mg, Intravenous, Q6H PRN, Cee Melendez MD    potassium chloride (K-DUR,KLOR-CON) CR tablet 20 mEq, 20 mEq, Oral, BID, Filomena Bundy PA-C, 20 mEq at 01/09/22 0950    [START ON 1/10/2022] spironolactone (ALDACTONE) tablet 25 mg, 25 mg, Oral, Daily, Filomena Bundy PA-C    Labs & Results:  Results from last 7 days   Lab Units 01/06/22  0027   CK TOTAL U/L 40     Results from last 7 days   Lab Units 01/09/22  0546 01/08/22  0537 01/07/22  0646   WBC Thousand/uL 5 36 5 07 5 57   HEMOGLOBIN g/dL 13 3 13 1 13 1   HEMATOCRIT % 40 7 40 2 38 5   PLATELETS Thousands/uL 246 195 205         Results from last 7 days   Lab Units 01/09/22  0546 01/08/22  0537 01/07/22  0646 01/06/22  0540 01/05/22 2001   POTASSIUM mmol/L 3 9 3 5 3 7   < > 3 2*   CHLORIDE mmol/L 108 108 109*   < > 107   CO2 mmol/L 26 28 27   < > 24   BUN mg/dL 34* 28* 23   < > 12   CREATININE mg/dL 1 15 1 00 1 09   < > 0 93   CALCIUM mg/dL 9 3 9 1 9 3   < > 9 8   ALK PHOS U/L  --   --   --   --  92   ALT U/L  --   --   --   --  31   AST U/L  --   --   --   --  26    < > = values in this interval not displayed           John Enciso PA-C

## 2022-01-09 NOTE — ASSESSMENT & PLAN NOTE
· Patient with h/o HFrEF with last known EF at 32% from 04/2021 and grade 2 diastolic dysfunction, severe diffuse hypokinesis, changes consistent with eccentric hypertrophy  · Patient also had an LHC which revealed non-ischemic cardiomyopathy  · In context of patients recent h/o SOB, orthopnea, abdominal distension, this may be related to CHF exacerbation  · Patient endorsed that she is non-compliant with her medications  Patients Andrews maybe related to pulmonary vascular congestion and non-compliance with medications     · Cardiology on board : patient was on lasix 20 mg IV BID, transitioned to 20 mg PO daily on 1/9/22  · ECHO shows new EF : 25%   · Patient has covid infection but appears to be asymptomatic at this time    PLAN :   · See further management per HF

## 2022-01-09 NOTE — ASSESSMENT & PLAN NOTE
Patient with no history of COVID vaccination, tested positive for COVID on 01/06/2022    - patient currently stable on room air, asymptomatic  - patient denies any fevers or chills  - placed on contact and airborne isolation

## 2022-01-09 NOTE — ASSESSMENT & PLAN NOTE
History of hypertension   - was prescribed losartan 50 mg daily, furosemide 20 mg daily, metoprolol succinate 25 mg daily    Patient is not compliant with medications at home  -Transitioned to lasix 20 mg daily from tomorrow by cardiology, continue losartan 50 mg daily  - restarted low dose BB -12 5 mg PO BID   - Patient is also on spironolactone per cardiology   - Continue to monitor Patient time of death 1208. Pronounced by this RN and RN Shana Hamm.Wife Oanh at bedside and informed. Wife walked out to car. MDs notified.

## 2022-01-09 NOTE — PHYSICAL THERAPY NOTE
PHYSICAL THERAPY Evaluation NOTE    Patient Name: Lenny DOWNEYNVinitaU Date: 1/9/2022     AGE:   63 y o   Mrn:   [id number]  ADMIT DX:  Joint swelling [M25 40]  Heart failure ([facility] [address] ) [I50 9]  Arthralgia of multiple joints [M25 50]  Nonischemic cardiomyopathy ([facility] [address] ) [I42 8]  Acute on chronic combined systolic and diastolic congestive heart failure (HCC) [I50 43]  HFrEF (heart failure with reduced ejection fraction) ([facility] [address] ) [I50 20]  COVID-19 [U07 1]    Past Medical History:   Diagnosis Date    2019 novel coronavirus disease (COVID-19) 01/06/2022    Coronary artery disease 05/05/2021    HFrEF (heart failure with reduced ejection fraction) ([facility] [address] ) 05/01/2021    Hyperlipidemia     Hypertension     Positive hepatitis C antibody test 05/01/2021    RSD (reflex sympathetic dystrophy)     Tobacco abuse      Length Of Stay: 3  PHYSICAL THERAPY EVALUATION :      01/09/22 1107   PT Last Visit   PT Visit Date 01/09/22   Note Type   Note type Evaluation   Pain Assessment   Pain Assessment Tool 0-10   Pain Score No Pain   Home Living   Type of [address] [place] [address] Pkw[place]   Prior Function   Level of [place] Independent with ADLs and functional mobility   Lives With Son   ADL Assistance Independent   IADLs Independent   General   Additional Pertinent History Pt  is a 64 yo F who presents with SINGH  + COVID-19      Family/Caregiver Present No   Cognition   Overall Cognitive Status WFL   Arousal/Participation Cooperative   Orientation Level Oriented X4   Memory Within functional limits   Following Commands Follows all commands and directions without difficulty   Comments Pt  was identified with full name and birthdate   Subjective   Subjective Pt  agreeable to PT Eval   RLE Assessment   RLE Assessment   (grossly 4/5)   LLE Assessment   LLE Assessment   (grossly 4/5)   Coordination   Movements are Fluid and Coordinated 1   Finger to Nose & Finger to Finger  Intact   Bed Mobility   Supine to Sit Unable to assess   Sit to Supine Unable to assess   Additional Comments Pt  seated OOB prior to and following PT evaluation   Transfers   Sit to Stand 5  Supervision   Additional items Increased time required   Stand to Sit 5  Supervision   Additional items Increased time required   Ambulation/Elevation   Gait pattern Improper Weight shift;Narrow IRA; Forward Flexion;Decreased foot clearance;Shuffling   Gait Assistance 5  Supervision   Assistive Device None   Distance 35'x1   Balance   Static Sitting Good   Dynamic Sitting Fair +   Static Standing Fair   Dynamic Standing Fair -   Ambulatory Fair -   Endurance Deficit   Endurance Deficit Yes   Endurance Deficit Description mild SOB noted following   Activity Tolerance   Activity Tolerance Patient tolerated treatment well   Nurse Made Aware Spoke to RN, cleared for PT session   Assessment   Prognosis Good   Problem List Decreased endurance; Impaired balance;Decreased mobility   Assessment Pt  is a 62 yo F who presents with SINGH  + COVID-19  order placed for PT eval and tx, w/ activity order of up and OOB as tolerated  pt presents w/ comorbidities of HLD, SINGH, htn, CHF, Arthralgia, Hx of MI, and personal factors of limited home support, compliance, unable to perform physical activity and homeless  pt presents w/ weakness, decreased endurance and impaired balance  these impairments are evident in findings from physical examination (weakness, decreased ROM and edema of extremities), mobility assessment (need for Supervision assist w/ all phases of mobility when usually mobilizing independently, tolerance to only 35 feet of ambulation and need for cueing for mobility technique), and AM-PAC 6-Clicks: 72/63  pt needed input for task focus and mobility technique  pt is at risk for falls due to physical and safety awareness deficits   pt's clinical presentation is unstable/unpredictable (evident in need for assist w/ all phases of mobility when usually mobilizing independently, need for input for mobility technique, need for input for task focus and mobility technique and need for input for mobility technique/safety)  D/C IP PT  discharge recommendation is for home w/ family support to reduce fall risk and maximize level of functional independence  Goals   Patient Goals to feel better    Recommendation   PT Discharge Recommendation No rehabilitation needs   AM-PAC Basic Mobility Inpatient   Turning in Bed Without Bedrails 4   Lying on Back to Sitting on Edge of Flat Bed 4   Moving Bed to Chair 4   Standing Up From Chair 4   Walk in Room 3   Climb 3-5 Stairs 3   Basic Mobility Inpatient Raw Score 22   Basic Mobility Standardized Score 47 4   Highest Level Of Mobility   JH-HLM Goal 7: Walk 25 feet or more   JH-HLM Highest Level of Mobility 7: Walk 25 feet or more   JH-HLM Goal Achieved Yes   End of Consult   Patient Position at End of Consult Bedside chair; All needs within reach     D/C IP PT  The patient's AM-PAC Basic Mobility Inpatient Short Form Raw Score is 22  A Raw score of greater than 16 suggests the patient may benefit from discharge to home  Please also refer to the recommendation of the Physical Therapist for safe discharge planning      Donna Taveras, PT, DPT 1/9/2022

## 2022-01-10 VITALS
SYSTOLIC BLOOD PRESSURE: 130 MMHG | OXYGEN SATURATION: 94 % | RESPIRATION RATE: 18 BRPM | DIASTOLIC BLOOD PRESSURE: 92 MMHG | HEART RATE: 62 BPM | TEMPERATURE: 97.3 F | BODY MASS INDEX: 24.65 KG/M2 | WEIGHT: 144.4 LBS | HEIGHT: 64 IN

## 2022-01-10 PROCEDURE — NC001 PR NO CHARGE: Performed by: INTERNAL MEDICINE

## 2022-01-10 PROCEDURE — 97166 OT EVAL MOD COMPLEX 45 MIN: CPT

## 2022-01-10 RX ORDER — DICYCLOMINE HCL 20 MG
20 TABLET ORAL 3 TIMES DAILY
Qty: 90 TABLET | Refills: 0 | Status: SHIPPED | OUTPATIENT
Start: 2022-01-10 | End: 2022-02-07

## 2022-01-10 RX ORDER — ALBUTEROL SULFATE 90 UG/1
2 AEROSOL, METERED RESPIRATORY (INHALATION) EVERY 4 HOURS PRN
Qty: 8.5 G | Refills: 0 | Status: SHIPPED | OUTPATIENT
Start: 2022-01-10

## 2022-01-10 RX ORDER — METOPROLOL SUCCINATE 25 MG/1
12.5 TABLET, EXTENDED RELEASE ORAL EVERY 12 HOURS SCHEDULED
Qty: 30 TABLET | Refills: 0 | Status: SHIPPED | OUTPATIENT
Start: 2022-01-10 | End: 2022-02-09

## 2022-01-10 RX ORDER — POTASSIUM CHLORIDE 20 MEQ/1
20 TABLET, EXTENDED RELEASE ORAL DAILY
Qty: 30 TABLET | Refills: 0 | Status: SHIPPED | OUTPATIENT
Start: 2022-01-10 | End: 2022-02-09

## 2022-01-10 RX ORDER — ATORVASTATIN CALCIUM 40 MG/1
40 TABLET, FILM COATED ORAL
Qty: 30 TABLET | Refills: 0 | Status: SHIPPED | OUTPATIENT
Start: 2022-01-10 | End: 2022-02-09

## 2022-01-10 RX ORDER — LOSARTAN POTASSIUM 50 MG/1
50 TABLET ORAL DAILY
Qty: 30 TABLET | Refills: 0 | Status: SHIPPED | OUTPATIENT
Start: 2022-01-10 | End: 2022-02-09

## 2022-01-10 RX ORDER — SPIRONOLACTONE 25 MG/1
25 TABLET ORAL DAILY
Qty: 30 TABLET | Refills: 0 | Status: SHIPPED | OUTPATIENT
Start: 2022-01-10 | End: 2022-02-07

## 2022-01-10 RX ORDER — FUROSEMIDE 20 MG/1
20 TABLET ORAL DAILY
Qty: 30 TABLET | Refills: 0 | Status: SHIPPED | OUTPATIENT
Start: 2022-01-10 | End: 2022-02-09

## 2022-01-10 RX ADMIN — DICLOFENAC SODIUM 2 G: 10 GEL TOPICAL at 09:05

## 2022-01-10 RX ADMIN — LOSARTAN POTASSIUM 50 MG: 50 TABLET, FILM COATED ORAL at 09:04

## 2022-01-10 RX ADMIN — NICOTINE 1 PATCH: 7 PATCH, EXTENDED RELEASE TRANSDERMAL at 09:05

## 2022-01-10 RX ADMIN — FUROSEMIDE 20 MG: 20 TABLET ORAL at 09:04

## 2022-01-10 RX ADMIN — ENOXAPARIN SODIUM 40 MG: 40 INJECTION SUBCUTANEOUS at 09:04

## 2022-01-10 RX ADMIN — POTASSIUM CHLORIDE 20 MEQ: 1500 TABLET, EXTENDED RELEASE ORAL at 09:04

## 2022-01-10 RX ADMIN — DICYCLOMINE HYDROCHLORIDE 20 MG: 20 TABLET ORAL at 06:00

## 2022-01-10 RX ADMIN — SPIRONOLACTONE 25 MG: 25 TABLET ORAL at 09:04

## 2022-01-10 RX ADMIN — METOPROLOL SUCCINATE 12.5 MG: 25 TABLET, EXTENDED RELEASE ORAL at 09:04

## 2022-01-10 RX ADMIN — ACETAMINOPHEN 650 MG: 325 TABLET, FILM COATED ORAL at 09:04

## 2022-01-10 NOTE — DISCHARGE SUMMARY
[address] [facility]  Discharge- Vicki Quinonez 1960, 63 y o  female MRN: [id number]  Unit/Bed#: [facility] 829-01 Encounter: [id number]  Primary Care Provider: No primary care provider on file  Date and time admitted to hospital: 1/5/2022  7:23 PM    Polyarticular arthritis  Assessment & Plan  · Patient presenting with 3 weeks history of asymmetric polyarticular joint pain associated with right knee, left ankle, right wrist, right 1st MCP, left 1st MCP  ·  Patient also reported mild swelling in these areas along with joint pain  · Patient states that the joint pain tends to resolve at one site and then move to another joint  · Patient was noted to have a small effusion on the right knee  · Ortho on board : patient refused knee aspiration   · Uric acid : normal, lyme panel : neg, anti CCP - pending, ESR - mildly elevated at 46, parvovirus Ab -negative   · X ray of the right wrist : degenerative changes of the 1st MCP, X ray rt knee : 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  8 mm intra-articular loose body suggested  · S/p lidocaine patch for right knee   · Pt is Hepatitis C ab +ve     PLAN :   · Follow pending labs   · [facility] tylenol for OA   · Continue voltaren gel   · Consider rheumatology follow up as outpatient       COVID-19 virus infection  Assessment & Plan  Patient with no history of COVID vaccination, tested positive for COVID on 01/06/2022    - patient currently stable on room air, asymptomatic  - patient denies any fevers or chills  - placed on contact and airborne isolation    Acute on chronic combined systolic and diastolic congestive heart failure (HCC)  Assessment & Plan  Wt Readings from Last 3 Encounters:   01/10/22 65 5 kg (144 lb 6 4 oz)   05/07/21 69 kg (152 lb 3 2 oz)   05/06/21 68 8 kg (151 lb 12 oz)     · Patient with HFrEF 32% presented with 3 week history of shortness of breath, dyspnea on exertion, orthopnea and long-term medication noncompliance at home  ·  Likely concerning for acute on chronic CHF  · Patient also had a prior admission in 2021 with similar symptoms  ProBNP at 6804 at this admission  · New EF of 25 %  · HF team on board   · Transitioned to PO lasix 20 mg daily starting 1/10/22, patient was on 20 mg IV lasix BID  Restarted on low dose BB 12 5 mg PO BID on 1/9/22     - Plan as above  -patient was D/Nigel on BB, losartan, PO lasix,spironolactone   - Outpatient follow up with cardiology, PCP     HFrEF (heart failure with reduced ejection fraction) [facility])  Assessment & Plan  Patient that heart failure with reduced EF, 32% with grade 2 diastolic dysfunction, severe diffuse hypokinesis, changes consistent with eccentric hypertrophy as of 05/2021      - patient is noncompliant with heart failure medications at baseline  - does not follow with a cardiologist in the outpatient setting  - HF team on board   - ECHO ( 1/6/22) shows EF - 25%, severely reduced systolic function, severe global hypokinesis and severely abnormal diastolic function   - Patient is on losartan, added low dose BB on 1/8/22, lasix 20 mg PO daily, spironolactone    PLAN :   · DC on GDMT   · Patient is not interested in lifevest at this time   · Follow  Up with cardiology       Hypertension  Assessment & Plan  History of hypertension   - was prescribed losartan 50 mg daily, furosemide 20 mg daily, metoprolol succinate 25 mg daily  Patient is not compliant with medications at home  -DC on PO lasix   - restarted low dose BB -12 5 mg PO BID   - Patient is also on spironolactone per cardiology   - Continue to monitor    [place] 74  H/o smoking  Currently smokes 4 cigarettes per day    - Patient provided with nicotine patch 7 mg while inpatient    Elevated troponin  Assessment & Plan  Likely related to myocardial injury secondary to ongoing CHF exacerbation    - Patient currently denies any chest pain or tightness          Pulmonary nodule  Assessment & Plan  · H/o 5 mm right middle lobe nodule  · Based on current Fleischner Society 2017 Guidelines on incidental pulmonary nodule, no routine follow-up is needed if the patient is considered low risk for lung cancer  ·  If the patient is considered high risk for lung cancer, 12 month follow-up non-contrast chest CT is recommended  Hyperlipidemia  Assessment & Plan  On atorvastatin 40 mg qd    Abdominal pain  Assessment & Plan  · Patient presented with intermittent generalized abdominal pain along with mild abdominal swelling  ·  Patient was also noted to have similar abdominal symptoms at last admission in 04/2021  · At that time, patient was noted to have some atherosclerosis of abdominal vessels (CT abdomen pelvis from that admission)  Her abdominal pain may be correlated to this  She may also benefit from additional diuresis to decrease possible third spacing of fluids in abdomen as a result of CHF   - Patient had mesenteric duplex and RUQ  U/S at previous admission which did not reveal any significant findings  - Was noted to be responsive to bentyl at last admission, ordered bentyl  - Will continue to monitor    * SINGH (dyspnea on exertion)  Assessment & Plan  · Patient with h/o HFrEF with last known EF at 32% from 04/2021 and grade 2 diastolic dysfunction, severe diffuse hypokinesis, changes consistent with eccentric hypertrophy  · Patient also had an LHC which revealed non-ischemic cardiomyopathy  · In context of patients recent h/o SOB, orthopnea, abdominal distension, this may be related to CHF exacerbation  · Patient endorsed that she is non-compliant with her medications  Patients Singh maybe related to pulmonary vascular congestion and non-compliance with medications     · Cardiology on board : patient was on lasix 20 mg IV BID, transitioned to 20 mg PO daily on 1/9/22  · ECHO shows new EF : 25%   · Patient has covid infection but appears to be asymptomatic at this time    PLAN :   · See further management per HF          Discharging Physician / Practitioner: James Bahena MD  PCP: No primary care provider on file  Admission Date:   Admission Orders (From admission, onward)     Ordered        01/07/22 1433  Inpatient Admission  Once            01/05/22 2305  Place in Observation  Once            01/05/22 2302  Place in Observation  Once            01/05/22 2259  Inpatient Admission  Once,   Status:  Canceled                      Discharge Date: 1/10/22     Medical Problems             Resolved Problems  Date Reviewed: 1/6/2022          Resolved    Hypokalemia 1/7/2022     Resolved by  James Bahena MD                Consultations During Hospital Stay:  · Heart failure     Procedures Performed:   · None     Significant Findings / Test Results:   XR chest 1 view portable    Result Date: 1/6/2022  Impression: Cardiomegaly  CHF  Workstation performed: [id number]     XR wrist 3+ vw right    Result Date: 1/6/2022  Impression: No acute osseous abnormality  Workstation performed: [id number]     XR hand 3+ vw left    Result Date: 1/7/2022  Impression: No acute osseous abnormality  Workstation performed: [id number]     XR hand 3+ vw right    Result Date: 1/7/2022  Impression: No acute osseous abnormality  Workstation performed: [id number]     XR knee 3 vw right non injury    Result Date: 1/6/2022  Impression: No acute osseous abnormality  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  8 mm intra-articular loose body suggested  Workstation performed: [id number]     XR ankle 3+ vw left    Result Date: 1/6/2022  · Impression: No acute osseous abnormality  Degenerative changes as described  ECHO ( 1/6/22) : Left Ventricle: Left ventricular cavity size is mildly dilated  Wall thickness is moderately increased  The left ventricular ejection fraction is 25%  Systolic function is severely reduced  There is severe global hypokinesis  There is eccentric hypertrophy   Diastolic function is severely abnormal, consistent with ungraded restricrive relaxation  There is no thrombus    The left ventricular wall motion is globally hypokinetic    Right Ventricle: Right ventricular cavity size is mildly dilated  Systolic function is mildly reduced    Left Atrium: The atrium is severely dilated    Right Atrium: The atrium is Mild to moderately dilated    Mitral Valve: There is mild regurgitation    Tricuspid Valve: There is mild regurgitation  The tricuspid valve regurgitation jet is eccentric  The right ventricular systolic pressure is moderately elevated  The estimated right ventricular systolic pressure is 87 8 mmHg  Incidental Findings:   · As mentioned above      Test Results Pending at Discharge (will require follow up): · None     Outpatient Tests Requested:  · BMP     Complications:  None     Reason for Admission: SOB     Hospital Course:     John Reina is a 63 y o  female patient who originally presented to the hospital on 1/5/2022 due to SOB  As per HPI :   Patient is a 63year old female with history of CHF (last EF 32% from 05/2021, G2DD), hypertension, hyperlipidemia presenting to the ED for shortness of breath and arthralgias for past 3 weeks  She also describes of generalized abdominal pain during this time frame  She states that she used to be able to walk a block comfortably prior to this but now experiences dyspnea on exertion with getting out of bed and walking a few feet  She also has had a whitish colored productive cough  She is unaware of any weight gain but does endorse having some orthopnea  She denies any chest pain at rest or with exertion  Has some generalized abdominal pain and reports of mild abdominal swelling  Denies any fevers, chills  She is not on any oxygen at baseline at home  She attempts to make her own meals on a daily basis but is not on a strict low salt diet  Does not follow with a cardiologist and is non-compliant with all of her medications   She states she is currently homeless and lives out of a car  Patient reports that she was admitted to the ED for similar symptoms in 04/2021  She is currently unvaccinated for covid       In the ED, patients BP was 183/91 with HR 93, T: 98 6F, saturating 96% on room air  BMP revealed potassium at 3 2, ProBNP of 6804, hs-troponin of 128, WBC ct at 6 87, patient tested positive for covid infection, CXR was ordered  Admitted to [facility] for acute on chronic CHF    Hospital Course :   During hospitalization, orthopedic was consulted for right knee effusion, and polyarticular arthritis  Orthopedic advised knee aspiration however patient refused  X ray revealed primary OA, and pain management was provided with PRN medications and topical voltaren gel  Labs were ordered including anti-CCP, parvovirus B-19 Abx ESR, CRP, Lyme panel and uric acid  X ray of the right wrist : degenerative changes of the 1st MCP, X ray rt knee : 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  8 mm intra-articular loose body suggested  Patient was found to be hepatitis C antibody positive  For COVID-19 patient was on room air hence was treated without any medication  For CHF, heart failure team was consulted, repeat ECHO revealed an EF of 25%  Patient was given IV diuresis followed by PO diuresis when appropriate  Patient was also started on low dose BB when appropriate from a CHF standpoint  Socorro Carbone was advised however patient was not interested  Patient was also found to have a 5 mm RML lung nodule for which outpatient 12 mo noncontrast CT chest was recommended  At the time of discharge, patient was afebrile and hemodynamically stable  She was provided new scripts for spironolactone, kcl, losartan, metoprolol succinate, and lasix PO  She was advised follow up with PCP and cardiology  Follow up BMP was provided  Patient was provided with appropriate COVID isolation instructions       Please see above list of diagnoses and related plan for additional information  Condition at Discharge: stable     Discharge Day Visit / Exam:     Subjective:  Patient was seen and examined at bedside  She reported no new complaints  Patient wanted to leave soon  Vitals: Blood Pressure: 130/92 (01/10/22 0904)  Pulse: 62 (01/10/22 0904)  Temperature: (!) 97 3 °F (36 3 °C) (01/10/22 0904)  Temp Source: Oral (01/09/22 1142)  Respirations: 18 (01/10/22 0904)  Height: 5' 4" (162 6 cm) (01/06/22 0900)  Weight - Scale: 65 5 kg (144 lb 6 4 oz) (01/10/22 0539)  SpO2: 94 % (01/09/22 2106)  Exam:   Physical Exam  Constitutional:       Appearance: Normal appearance  HENT:      Mouth/Throat:      Mouth: Mucous membranes are dry  Cardiovascular:      Rate and Rhythm: Normal rate and regular rhythm  Heart sounds: No murmur heard  No gallop  Pulmonary:      Breath sounds: No wheezing or rales  Abdominal:      General: Bowel sounds are normal       Palpations: Abdomen is soft  Tenderness: There is no abdominal tenderness  There is no guarding  Musculoskeletal:      Right lower leg: No edema  Left lower leg: No edema  Comments: Mild right knee tenderness   Neurological:      Mental Status: She is alert  Discharge instructions/Information to patient and family:   See after visit summary for information provided to patient and family  Provisions for Follow-Up Care:  See after visit summary for information related to follow-up care and any pertinent home health orders  Disposition:     Home    For Discharges to [facility] SNF:   · Not Applicable to this Patient - Not Applicable to this Patient    Planned Readmission: None      Discharge Statement:  I spent 30 minutes discharging the patient  This time was spent on the day of discharge  I had direct contact with the patient on the day of discharge   Greater than 50% of the total time was spent examining patient, answering all patient questions, arranging and discussing plan of care with patient as well as directly providing post-discharge instructions  Additional time then spent on discharge activities  Discharge Medications:  See after visit summary for reconciled discharge medications provided to patient and family        ** Please Note: This note has been constructed using a voice recognition system **

## 2022-01-10 NOTE — PLAN OF CARE
Problem: PAIN - ADULT  Goal: Verbalizes/displays adequate comfort level or baseline comfort level  Description: Interventions:  - Encourage patient to monitor pain and request assistance  - Assess pain using appropriate pain scale  - Administer analgesics based on type and severity of pain and evaluate response  - Implement non-pharmacological measures as appropriate and evaluate response  - Consider cultural and social influences on pain and pain management  - Notify physician/advanced practitioner if interventions unsuccessful or patient reports new pain  Outcome: Progressing     Problem: INFECTION - ADULT  Goal: Absence or prevention of progression during hospitalization  Description: INTERVENTIONS:  - Assess and monitor for signs and symptoms of infection  - Monitor lab/diagnostic results  - Monitor all insertion sites, i e  indwelling lines, tubes, and drains  - Monitor endotracheal if appropriate and nasal secretions for changes in amount and color  - [place] appropriate cooling/warming therapies per order  - Administer medications as ordered  - Instruct and encourage patient and family to use good hand hygiene technique  - Identify and instruct in appropriate isolation precautions for identified infection/condition  Outcome: Progressing     Problem: DISCHARGE PLANNING  Goal: Discharge to home or other facility with appropriate resources  Description: INTERVENTIONS:  - Identify barriers to discharge w/patient and caregiver  - Arrange for needed discharge resources and transportation as appropriate  - Identify discharge learning needs (meds, wound care, etc )  - Arrange for interpretive services to assist at discharge as needed  - Refer to Case Management Department for coordinating discharge planning if the patient needs post-hospital services based on physician/advanced practitioner order or complex needs related to functional status, cognitive ability, or social support system  Outcome: Progressing Problem: Knowledge Deficit  Goal: Patient/family/caregiver demonstrates understanding of disease process, treatment plan, medications, and discharge instructions  Description: Complete learning assessment and assess knowledge base    Interventions:  - Provide teaching at level of understanding  - Provide teaching via preferred learning methods  Outcome: Progressing     Problem: CARDIOVASCULAR - ADULT  Goal: Maintains optimal cardiac output and hemodynamic stability  Description: INTERVENTIONS:  - Monitor I/O, vital signs and rhythm  - Monitor for S/S and trends of decreased cardiac output  - Administer and titrate ordered vasoactive medications to optimize hemodynamic stability  - Assess quality of pulses, skin color and temperature  - Assess for signs of decreased coronary artery perfusion  - Instruct patient to report change in severity of symptoms  Outcome: Progressing  Goal: Absence of cardiac dysrhythmias or at baseline rhythm  Description: INTERVENTIONS:  - Continuous cardiac monitoring, vital signs, obtain 12 lead EKG if ordered  - Administer antiarrhythmic and heart rate control medications as ordered  - Monitor electrolytes and administer replacement therapy as ordered  Outcome: Progressing     Problem: RESPIRATORY - ADULT  Goal: Achieves optimal ventilation and oxygenation  Description: INTERVENTIONS:  - Assess for changes in respiratory status  - Assess for changes in mentation and behavior  - Position to facilitate oxygenation and minimize respiratory effort  - Oxygen administered by appropriate delivery if ordered  - Initiate smoking cessation education as indicated  - Encourage broncho-pulmonary hygiene including cough, deep breathe, Incentive Spirometry  - Assess the need for suctioning and aspirate as needed  - Assess and instruct to report SOB or any respiratory difficulty  - Respiratory Therapy support as indicated  Outcome: Progressing     Problem: METABOLIC, FLUID AND ELECTROLYTES - ADULT  Goal: Electrolytes maintained within normal limits  Description: INTERVENTIONS:  - Monitor labs and assess patient for signs and symptoms of electrolyte imbalances  - Administer electrolyte replacement as ordered  - Monitor response to electrolyte replacements, including repeat lab results as appropriate  - Instruct patient on fluid and nutrition as appropriate  Outcome: Progressing  Goal: Fluid balance maintained  Description: INTERVENTIONS:  - Monitor labs   - Monitor I/O and WT  - Instruct patient on fluid and nutrition as appropriate  - Assess for signs & symptoms of volume excess or deficit  Outcome: Progressing

## 2022-01-10 NOTE — OCCUPATIONAL THERAPY NOTE
Occupational Therapy Evaluation     Patient Name: Lamberto Gross  LYGQZ'[id number] Date: 1/10/2022  Problem List  Principal Problem:    SINGH (dyspnea on exertion)  Active Problems:    Abdominal pain    Hyperlipidemia    Pulmonary nodule    Elevated troponin    Smoker    Hypertension    HFrEF (heart failure with reduced ejection fraction) ([facility])    Acute on chronic combined systolic and diastolic congestive heart failure ([facility] )    COVID-19 virus infection    Arthralgia of multiple joints    Polyarticular arthritis    Past Medical History  Past Medical History:   Diagnosis Date    2019 novel coronavirus disease (COVID-19) 01/06/2022    Coronary artery disease 05/05/2021    HFrEF (heart failure with reduced ejection fraction) ([facility] ) 05/01/2021    Hyperlipidemia     Hypertension     Positive hepatitis C antibody test 05/01/2021    RSD (reflex sympathetic dystrophy)     Tobacco abuse      Past Surgical History  Past Surgical History:   Procedure Laterality Date    BREAST SURGERY               01/10/22 0940   OT Last Visit   OT Visit Date 01/10/22   Note Type   Note type Evaluation   Restrictions/Precautions   Weight Bearing Precautions Per Order Yes   RLE Weight Bearing Per Order WBAT  (per ortho)   Other Precautions Airborne/isolation;Contact/isolation; Fall Risk  (COVID+, EF 32%)   Pain Assessment   Pain Assessment Tool 0-10   Pain Score No Pain   Home Living   Type of [address] [facility]   (Pt reports living in car)   [address]   Additional Comments Pt reports being homeless and lives with son  Prior Function   Level of Kay Independent with ADLs and functional mobility   Lives With Son   Receives Help From Family  (Son)   ADL Assistance Independent   IADLs Independent   Falls in the last 6 months 0   Vocational Unemployed   Comments PTA, Pt reports being I with ADLs and functional mobility with no AD   Pt reports having difficulty participating in ADLs due to being homeless and states she rents a motel with the money her son makes to perform hygiene tasks at independent level  Lifestyle   Autonomy I with ADLs, no AD for functional mobility   Reciprocal Relationships Son   Service to Others Unemployed   Intrinsic Gratification "I live in a car, I do not do much "   Psychosocial   Psychosocial (WDL) WDL   ADL   Where Assessed Edge of bed   Eating Assistance 7  Independent   Grooming Assistance 7  Independent   UB Bathing Assistance 7  Independent   LB Bathing Assistance 5  Em[address] 5  Supervision/Setup   LB Dressing Deficit Don/doff R shoe;Don/doff L shoe   Toileting Assistance  5  Supervision/Setup   Functional Assistance 5  Supervision/Setup   Functional Deficit Increased time to complete;Supervision/safety;Setup   Additional Comments Pt provided with energy conservation handout  Reviewed handout with Pt and Pt understanding  Pt also educated on recovery postures  Pt with no further acute OT concerns  Bed Mobility   Supine to Sit Unable to assess   Sit to Supine Unable to assess   Additional Comments Pt greeted sitting on EOB and left sitting on EOB at end of session  All needs met and call bell nearby  Transfers   Sit to Stand 5  Supervision   Additional items Increased time required   Stand to Sit 5  Supervision   Additional items Increased time required   Functional Mobility   Functional Mobility 5  Supervision   Additional Comments no AD  Within room distances  Balance   Static Sitting Good   Dynamic Sitting Fair +   Static Standing Fair   Dynamic Standing Fair -   Ambulatory Fair -   Activity Tolerance   Activity Tolerance Patient tolerated treatment well   Nurse Made Aware RN cleared     RUE Assessment   RUE Assessment WFL   LUE Assessment   LUE Assessment WFL   Hand Function   Gross Motor Coordination Functional   Fine Motor Coordination Functional   Sensation   Light Touch No apparent deficits   Cognition Overall Cognitive Status WFL   Arousal/Participation Cooperative; Alert   Attention Within functional limits   Orientation Level Oriented X4   Memory Within functional limits   Following Commands Follows all commands and directions without difficulty   Comments Pt pleasant and cooperative during OT session  Pt with flat affect  Assessment   Limitation Decreased ADL status; Decreased high-level ADLs; Decreased endurance   Prognosis Fair   Assessment Pt is a 62 yo Female who presented to [facility] on 1/5/2022 with SINGH  Pt with diagnosis of COVID  Pt  has a past medical history of 2019 novel coronavirus disease (COVID-19) (01/06/2022), Coronary artery disease (05/05/2021), HFrEF (heart failure with reduced ejection fraction) ([facility] ) (05/01/2021), Hyperlipidemia, Hypertension, Positive hepatitis C antibody test (05/01/2021), RSD (reflex sympathetic dystrophy), and Tobacco abuse  Pt greeted bedside for OT evaluation on 1/10/2022  Pt reports being homeless and lives with son  PTA, Pt reports being I with ADLs and functional mobility with no AD  Pt reports having difficulty participating in ADLs due to being homeless and states she rents a motel with the money her son makes to perform hygiene tasks at independent level  Pt demonstrating the following occupational deficits: I with UB ADLs, S with LB ADLs, S with transfers, and S with functional mobility with no AD  Pt provided with energy conservation handout  Reviewed handout with Pt and Pt understanding  Pt also educated on recovery postures  Pt with no further acute OT concerns  Pt would benefit from returning home with increased social support upon DC to maximize safety and independence with ADLs and functional tasks of choice  DC skilled OT services  Goals   Patient Goals To get better     Plan   OT Frequency Eval only   Recommendation   OT Discharge Recommendation No rehabilitation needs   OT - OK to Discharge Yes   Additional Comments  The patient's raw score on the AM-PAC Daily Activity inpatient short form is 21, standardized score is 44 27, greater than 39 4  Patients at this level are likely to benefit from discharge to home  Please refer to the recommendation of the Occupational Therapist for safe discharge planning     AM-PAC Daily Activity Inpatient   Lower Body Dressing 3   Bathing 3   Toileting 3   Upper Body Dressing 4   Grooming 4   Eating 4   Daily Activity Raw Score 21   Daily Activity Standardized Score (Calc for Raw Score >=11) 44 27   AM-PAC Applied Cognition Inpatient   Following a Speech/Presentation 4   Understanding Ordinary Conversation 4   Taking Medications 4   Remembering Where Things Are Placed or Put Away 4   Remembering List of 4-5 Errands 4   Taking Care of Complicated Tasks 4   Applied Cognition Raw Score 24   Applied Cognition Standardized Score 62 21     Gema Fraga MS, OTR/L

## 2022-01-10 NOTE — DISCHARGE INSTR - AVS FIRST PAGE
Please take the following medications for heart failure :   Spironolactone 25 mg by mouth daily   Potassium chloride 20 Meq by mouth daily  This medication is for low potassium   Lasix (furosemide) 20 mg by mouth daily   Metoprolol succinate 12 5 mg by mouth every 12 hours   Losartan 50 mg by mouth daily     Please take the following for high cholesterol:   Atorvastatin 40 mg by mouth daily after dinner     Please take the following for abdominal pain:   Dicyclomine 20 mg by mouth three times a day before meals     FOLLOW UP BLOOD WORK :   Please obtain a BMP on 1/13/22 and discuss results with the PCP     FOLLOW UP PROVIDERS:   Please follow up with your PCP regularly   Please follow up with the cardiologist for management of heart failure  Follow up instructions have been provided       Please note that new scripts have been provided for your medications

## 2022-01-11 NOTE — ASSESSMENT & PLAN NOTE
· Patient presenting with 3 weeks history of asymmetric polyarticular joint pain associated with right knee, left ankle, right wrist, right 1st MCP, left 1st MCP  ·  Patient also reported mild swelling in these areas along with joint pain  · Patient states that the joint pain tends to resolve at one site and then move to another joint  · Patient was noted to have a small effusion on the right knee  · Ortho on board : patient refused knee aspiration   · Uric acid : normal, lyme panel : neg, anti CCP - pending, ESR - mildly elevated at 46, parvovirus Ab -negative   · X ray of the right wrist : degenerative changes of the 1st MCP, X ray rt knee : 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   8 mm intra-articular loose body suggested  · S/p lidocaine patch for right knee   · Pt is Hepatitis C ab +ve     PLAN :   · Follow pending labs   · [facility] tylenol for OA   · Continue voltaren gel   · Consider rheumatology follow up as outpatient

## 2022-01-11 NOTE — ASSESSMENT & PLAN NOTE
Wt Readings from Last 3 Encounters:   01/10/22 65 5 kg (144 lb 6 4 oz)   05/07/21 69 kg (152 lb 3 2 oz)   05/06/21 68 8 kg (151 lb 12 oz)     · Patient with HFrEF 32% presented with 3 week history of shortness of breath, dyspnea on exertion, orthopnea and long-term medication noncompliance at home  ·  Likely concerning for acute on chronic CHF  · Patient also had a prior admission in 2021 with similar symptoms  ProBNP at 6804 at this admission  · New EF of 25 %  · HF team on board   · Transitioned to PO lasix 20 mg daily starting 1/10/22, patient was on 20 mg IV lasix BID   Restarted on low dose BB 12 5 mg PO BID on 1/9/22     - Plan as above  -patient was D/Nigel on BB, losartan, PO lasix,spironolactone   - Outpatient follow up with cardiology, PCP

## 2022-01-11 NOTE — ASSESSMENT & PLAN NOTE
Patient that heart failure with reduced EF, 32% with grade 2 diastolic dysfunction, severe diffuse hypokinesis, changes consistent with eccentric hypertrophy as of 05/2021      - patient is noncompliant with heart failure medications at baseline  - does not follow with a cardiologist in the outpatient setting  - HF team on board   - ECHO ( 1/6/22) shows EF - 25%, severely reduced systolic function, severe global hypokinesis and severely abnormal diastolic function   - Patient is on losartan, added low dose BB on 1/8/22, lasix 20 mg PO daily, spironolactone    PLAN :   · DC on GDMT   · Patient is not interested in lifevest at this time   · Follow  Up with cardiology

## 2022-01-11 NOTE — ASSESSMENT & PLAN NOTE
History of hypertension   - was prescribed losartan 50 mg daily, furosemide 20 mg daily, metoprolol succinate 25 mg daily    Patient is not compliant with medications at home  -DC on PO lasix   - restarted low dose BB -12 5 mg PO BID   - Patient is also on spironolactone per cardiology   - Continue to monitor

## 2022-01-11 NOTE — UTILIZATION REVIEW
Notification of Discharge   This is a Notification of Discharge from our facility [address]  Please be advised that this patient has been discharge from our facility  Below you will find the admission and discharge date and time including the patients disposition  UTILIZATION REVIEW CONTACT:  Courtney Woods  Utilization   Network Utilization Review Department  Phone: [phone] x carefully listen to the prompts  All voicemails are confidential   Email: [email or web address]     PHYSICIAN ADVISORY SERVICES:  FOR [facility] REVIEW - MEDICAL NECESSITY DENIAL  Phone: [phone]  Fax: [phone]  Email: [email or web address]     PRESENTATION DATE: 1/5/2022  7:23 PM  OBERVATION ADMISSION DATE:   INPATIENT ADMISSION DATE: 1/5/22 10:59 PM   DISCHARGE DATE: 1/10/2022 10:12 AM  DISPOSITION: Home/Self Care Home/Self Care      IMPORTANT INFORMATION:  Send all requests for admission clinical reviews, approved or denied determinations and any other requests to dedicated fax number below belonging to the campus where the patient is receiving treatment   List of dedicated fax numbers:  [address] DENIALS (Administrative/Medical Necessity) [phone]   [address] (Maternity/NICU/Pediatrics) [phone]   Meghan Flatten [phone]   Kiran Thomson [phone]   Mima Adan [phone]   [facility] [address] [phone]   [facility]  [phone]   [address] [place] [address], [address]  [address] [facility] [address] [facility] [phone]

## 2022-01-13 LAB
AMPHETAMINES UR QL SCN: NEGATIVE NG/ML
BARBITURATES UR QL SCN: NEGATIVE NG/ML
BENZODIAZ UR QL: NEGATIVE NG/ML
BZE UR QL: NEGATIVE NG/ML
CANNABINOIDS UR QL SCN: POSITIVE
METHADONE UR QL SCN: NEGATIVE NG/ML
OPIATES UR QL: NEGATIVE NG/ML
PCP UR QL: NEGATIVE NG/ML
PROPOXYPH UR QL SCN: NEGATIVE NG/ML

## 2022-02-02 DIAGNOSIS — R10.9 ABDOMINAL PAIN, UNSPECIFIED ABDOMINAL LOCATION: ICD-10-CM

## 2022-02-02 DIAGNOSIS — I50.9 HEART FAILURE (HCC): ICD-10-CM

## 2022-02-07 DIAGNOSIS — R10.9 ABDOMINAL PAIN, UNSPECIFIED ABDOMINAL LOCATION: ICD-10-CM

## 2022-02-07 RX ORDER — DICYCLOMINE HCL 20 MG
TABLET ORAL
Qty: 90 TABLET | Refills: 0 | Status: SHIPPED | OUTPATIENT
Start: 2022-02-07 | End: 2022-02-08

## 2022-02-07 RX ORDER — SPIRONOLACTONE 25 MG/1
25 TABLET ORAL DAILY
Qty: 30 TABLET | Refills: 0 | Status: SHIPPED | OUTPATIENT
Start: 2022-02-07 | End: 2022-03-09

## 2022-02-08 RX ORDER — DICYCLOMINE HCL 20 MG
TABLET ORAL
Qty: 90 TABLET | Refills: 0 | Status: SHIPPED | OUTPATIENT
Start: 2022-02-08
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
## 2023-12-11 NOTE — ASSESSMENT & PLAN NOTE
Pharmacy called requesting refills.     · H/o 5 mm right middle lobe nodule  · Based on current Fleischner Society 2017 Guidelines on incidental pulmonary nodule, no routine follow-up is needed if the patient is considered low risk for lung cancer  ·  If the patient is considered high risk for lung cancer, 12 month follow-up non-contrast chest CT is recommended